# Patient Record
Sex: FEMALE | Race: BLACK OR AFRICAN AMERICAN | Employment: UNEMPLOYED
[De-identification: names, ages, dates, MRNs, and addresses within clinical notes are randomized per-mention and may not be internally consistent; named-entity substitution may affect disease eponyms.]

---

## 2017-01-11 ENCOUNTER — OFFICE VISIT (OUTPATIENT)
Dept: OBGYN | Facility: CLINIC | Age: 47
End: 2017-01-11

## 2017-01-11 VITALS
HEIGHT: 62 IN | DIASTOLIC BLOOD PRESSURE: 88 MMHG | TEMPERATURE: 98.1 F | SYSTOLIC BLOOD PRESSURE: 125 MMHG | RESPIRATION RATE: 19 BRPM | WEIGHT: 204.8 LBS | BODY MASS INDEX: 37.69 KG/M2 | HEART RATE: 93 BPM

## 2017-01-11 DIAGNOSIS — N93.9 ABNORMAL UTERINE BLEEDING (AUB): Primary | ICD-10-CM

## 2017-01-11 DIAGNOSIS — K62.5 RECTAL BLEEDING: ICD-10-CM

## 2017-01-11 PROCEDURE — 99213 OFFICE O/P EST LOW 20 MIN: CPT | Performed by: OBSTETRICS & GYNECOLOGY

## 2017-01-24 ENCOUNTER — PROCEDURE VISIT (OUTPATIENT)
Dept: OBGYN | Facility: CLINIC | Age: 47
End: 2017-01-24

## 2017-01-24 VITALS
HEIGHT: 62 IN | DIASTOLIC BLOOD PRESSURE: 91 MMHG | WEIGHT: 206.3 LBS | SYSTOLIC BLOOD PRESSURE: 137 MMHG | HEART RATE: 82 BPM | BODY MASS INDEX: 37.97 KG/M2

## 2017-01-24 DIAGNOSIS — Z30.430 ENCOUNTER FOR INSERTION OF MIRENA IUD: Primary | ICD-10-CM

## 2017-01-24 DIAGNOSIS — N93.9 ABNORMAL UTERINE BLEEDING (AUB): ICD-10-CM

## 2017-01-24 LAB
CONTROL: NORMAL
PREGNANCY TEST URINE, POC: NEGATIVE

## 2017-01-24 PROCEDURE — 81025 URINE PREGNANCY TEST: CPT | Performed by: OBSTETRICS & GYNECOLOGY

## 2017-01-24 PROCEDURE — 58300 INSERT INTRAUTERINE DEVICE: CPT | Performed by: OBSTETRICS & GYNECOLOGY

## 2017-01-25 ENCOUNTER — OFFICE VISIT (OUTPATIENT)
Dept: SURGERY | Facility: CLINIC | Age: 47
End: 2017-01-25

## 2017-01-25 VITALS
TEMPERATURE: 98.4 F | WEIGHT: 208.8 LBS | BODY MASS INDEX: 39.42 KG/M2 | HEIGHT: 61 IN | SYSTOLIC BLOOD PRESSURE: 126 MMHG | DIASTOLIC BLOOD PRESSURE: 81 MMHG | HEART RATE: 81 BPM

## 2017-01-25 DIAGNOSIS — K64.9 HEMORRHOIDS, UNSPECIFIED HEMORRHOID TYPE: Primary | ICD-10-CM

## 2017-01-25 PROCEDURE — 99203 OFFICE O/P NEW LOW 30 MIN: CPT | Performed by: STUDENT IN AN ORGANIZED HEALTH CARE EDUCATION/TRAINING PROGRAM

## 2017-01-25 RX ORDER — POLYETHYLENE GLYCOL 3350 17 G/17G
17 POWDER, FOR SOLUTION ORAL DAILY
Qty: 510 G | Refills: 0 | Status: SHIPPED | OUTPATIENT
Start: 2017-01-25 | End: 2017-02-24

## 2017-01-25 RX ORDER — METRONIDAZOLE 500 MG/1
500 TABLET ORAL 2 TIMES DAILY
Qty: 14 TABLET | Refills: 0 | Status: SHIPPED | OUTPATIENT
Start: 2017-01-25 | End: 2017-02-01

## 2018-07-27 ENCOUNTER — OFFICE VISIT (OUTPATIENT)
Dept: OBGYN | Age: 48
End: 2018-07-27
Payer: COMMERCIAL

## 2018-07-27 VITALS
HEART RATE: 74 BPM | HEIGHT: 61 IN | BODY MASS INDEX: 34.93 KG/M2 | TEMPERATURE: 98.1 F | WEIGHT: 185 LBS | SYSTOLIC BLOOD PRESSURE: 152 MMHG | DIASTOLIC BLOOD PRESSURE: 91 MMHG

## 2018-07-27 DIAGNOSIS — N93.9 ABNORMAL UTERINE BLEEDING (AUB): ICD-10-CM

## 2018-07-27 DIAGNOSIS — N89.8 VAGINA ITCHING: Primary | ICD-10-CM

## 2018-07-27 DIAGNOSIS — N89.8 VAGINAL DISCHARGE: ICD-10-CM

## 2018-07-27 DIAGNOSIS — Z97.5 IUD (INTRAUTERINE DEVICE) IN PLACE: ICD-10-CM

## 2018-07-27 PROCEDURE — 1036F TOBACCO NON-USER: CPT | Performed by: OBSTETRICS & GYNECOLOGY

## 2018-07-27 PROCEDURE — G8417 CALC BMI ABV UP PARAM F/U: HCPCS | Performed by: OBSTETRICS & GYNECOLOGY

## 2018-07-27 PROCEDURE — 99213 OFFICE O/P EST LOW 20 MIN: CPT | Performed by: OBSTETRICS & GYNECOLOGY

## 2018-07-27 PROCEDURE — G8427 DOCREV CUR MEDS BY ELIG CLIN: HCPCS | Performed by: OBSTETRICS & GYNECOLOGY

## 2018-07-27 RX ORDER — OXYCODONE HYDROCHLORIDE AND ACETAMINOPHEN 5; 325 MG/1; MG/1
1 TABLET ORAL EVERY 6 HOURS PRN
COMMUNITY
End: 2020-05-21

## 2018-07-27 RX ORDER — SERTRALINE HYDROCHLORIDE 100 MG/1
100 TABLET, FILM COATED ORAL
Status: ON HOLD | COMMUNITY
End: 2022-03-14 | Stop reason: HOSPADM

## 2018-07-27 RX ORDER — AMOXICILLIN 500 MG/1
TABLET, FILM COATED ORAL
COMMUNITY
End: 2019-10-09

## 2018-07-27 RX ORDER — FLUCONAZOLE 150 MG/1
150 TABLET ORAL ONCE
Qty: 2 TABLET | Refills: 0 | Status: SHIPPED | OUTPATIENT
Start: 2018-07-27 | End: 2018-07-27

## 2018-07-27 RX ORDER — AMOXICILLIN AND CLAVULANATE POTASSIUM 875; 125 MG/1; MG/1
TABLET, FILM COATED ORAL
COMMUNITY
Start: 2018-07-25 | End: 2019-10-09

## 2018-07-27 RX ORDER — PRAZOSIN HYDROCHLORIDE 2 MG/1
CAPSULE ORAL
Status: ON HOLD | COMMUNITY
Start: 2018-07-06 | End: 2022-03-14 | Stop reason: SDUPTHER

## 2018-07-27 RX ORDER — HYDROXYZINE 50 MG/1
50 TABLET, FILM COATED ORAL
COMMUNITY
End: 2020-05-21

## 2018-07-27 NOTE — PROGRESS NOTES
OB/GYN Problem Visit    Blair Matos  7/27/2018                       Primary Care Physician: Jacob Nobles MD    CC:   Chief Complaint   Patient presents with    Vaginal Bleeding     patient states she has been bleeding every since she got IUD over a year ago,NOW ALSO HAVING RECTAL BLEEDING,HAD PELVIC CYST REMOVAL EARLY THIS WEEK    Vaginal Discharge     onset over a month (patient states she is on antibiotic)    Vaginal Itching         HPI: Blair Matos is a 41 y.o. female [id number]    The patient was seen and examined. She is here for complaints of vaginal itching and vaginal discharge that started in the last few days. She had a procedure performed at [facility] on 7/24/18 where she had a pelvic abscess caused by diverticulitis drained by Interventional Radiology. She has a drain in place at this time. She is currently taking Amoxicillin at this time. She is on day 2 of 14. She states that she noticed the vaginal discharge shortly thereafter. Her No LMP recorded. Patient is not currently having periods (Reason: Other - See Notes). and she denies irregular/heavy bleeding and dysmenorrhea. Her periods are irregular and she has had continuous bleeding since her IUD was placed 1/2017.      REVIEW OF SYSTEMS:  Constitutional: negative fever, negative chills  HEENT: negative visual disturbances, negative headaches  Respiratory: negative dyspnea, negative cough  Cardiovascular: negative chest pain,  negative palpitations  Gastrointestinal: negative abdominal pain, negative RUQ pain, negative N/V, negative diarrhea, negative constipation  Genitourinary: negative dysuria, negative vaginal discharge  Dermatological: negative rash  Hematologic: negative bruising  Immunologic/Lymphatic: negative recent illness, negative recent sick contact  Musculoskeletal: negative back pain, negative myalgias, negative arthralgias  Neurological:  negative dizziness, negative weakness  Behavior/Psych: negative depression, negative anxiety  ________________________________________________________________________    GYNECOLOGICAL HISTORY:  Age of Menarche: 8  Age of Menopause: N/A     Sexually Active: single partner, contraception - IUD  STD History: no past history    Pap History: Last PAP was normal  Colposcopy History: denies    Permanent Sterilization: no  Reversible Birth Control: yes - Mirena IUD  Hormone Replacement Exposure: no    OBSTETRICAL HISTORY:  OB History    Para Term  AB Living   4 3 3 0 0 4   SAB TAB Ectopic Molar Multiple Live Births   0 0 0   0 1      # Outcome Date GA Lbr Tarun/2nd Weight Sex Delivery Anes PTL Lv   4       CS-Unspec   CHOLO   3 Term      CS-Unspec      2 Term      Vag-Spont      1 Term      Vag-Spont             PAST MEDICAL HISTORY:      Diagnosis Date    Bipolar disorder ([facility] [address]     Depression     Gastritis     Hypertension     IBS (irritable bowel syndrome)        PAST SURGICAL HISTORY:                                                                    Procedure Laterality Date    APPENDECTOMY       SECTION      x2    TONSILLECTOMY      TUBAL LIGATION         MEDICATIONS:  Current Outpatient Prescriptions   Medication Sig Dispense Refill    Amoxicillin 500 MG TABS amoxicillin 500 mg tablet   Take 1 tablet every 12 hours by oral route as directed for 10 days.  amoxicillin-clavulanate (AUGMENTIN) 875-125 MG per tablet       sertraline (ZOLOFT) 100 MG tablet Take 100 mg by mouth      hydrOXYzine (ATARAX) 50 MG tablet Take 50 mg by mouth      prazosin (MINIPRESS) 2 MG capsule       oxyCODONE-acetaminophen (PERCOCET) 5-325 MG per tablet Take 1 tablet by mouth every 6 hours as needed for Pain. .      butenafine (LOTRIMIN ULTRA) 1 % CREA Apply 1 Tube topically as needed (apply to affected area as needed) 1 Tube 1    fluconazole (DIFLUCAN) 150 MG tablet Take 1 tablet by mouth once for 1 dose Use additional tablet after completion of antibiotics if discharge persists. 2 tablet 0    traZODone (DESYREL) 100 MG tablet Take 200 mg by mouth nightly as needed for Sleep      amLODIPine (NORVASC) 2.5 MG tablet Take 2.5 mg by mouth daily      hydrochlorothiazide (HYDRODIURIL) 25 MG tablet Take 25 mg by mouth daily      cyclobenzaprine (FLEXERIL) 10 MG tablet Take 10 mg by mouth 3 times daily as needed for Muscle spasms.  psyllium (METAMUCIL) 0.52 G capsule Take 1 capsule by mouth 2 times daily 60 capsule 2    ARIPiprazole (ABILIFY) 10 MG tablet Take 1 tablet by mouth daily 30 tablet 0    vilazodone HCl (VIIBRYD) 20 MG TABS Take 1 tablet by mouth daily 30 tablet 0    clonazePAM (KLONOPIN) 1 MG tablet Take 1 mg by mouth 3 times daily      magnesium citrate (CITROMA) SOLN Take 150 mLs by mouth daily as needed (constipation). 1 Bottle 1    Sodium Phosphates (FLEET) 7-19 GM/118ML Place 1 enema rectally daily as needed (constipation). 2 Bottle 1     No current facility-administered medications for this visit. ALLERGIES:  Allergies as of 07/27/2018 - Review Complete 07/27/2018   Allergen Reaction Noted    Motrin [ibuprofen] Hives 05/09/2014                                   VITALS:  Vitals:    07/27/18 0913   BP: (!) 152/91   Site: Left Arm   Position: Sitting   Cuff Size: Large Adult   Pulse: 74   Temp: 98.1 °F (36.7 °C)   TempSrc: Oral   Weight: 185 lb (83.9 kg)   Height: 5' 1\" (1.549 m)                                                                                                                                                                       PHYSICAL EXAM: patient unable to lay flat on table due to recent surgery and patient discomfort    General Appearance: Appears healthy. Alert; in no acute distress. Pleasant. Skin: Normal  Lymphatic: No cervical, superclavicular, axillary, or inguinal adenopathy.   HEENT: normocephalic and atraumatic, Thyroid normal to inspection and palpation  Respiratory: clear to auscultation, no wheezes, rales or rhonchi, symmetric air entry  Cardiovascular: normal, regular rate and rhythm, no murmurs, rubs, or gallops  Breast:  (Chest): declined  Abdomen: patient unable to lay flat on table due to recent surgery and patient discomfort  Pelvic Exam:  deferred  Rectal Exam: deferred  Extremities: non-tender BLE and non-edematous  Musculoskeletal: no gross abnormalities  Psych: Normal. and Alert and oriented, appropriate affect. OMM EXAM:  The patient did not complain of a Chief complaint requiring OMM. Chief Complaint:none    Structural Exam: No Interest    DATA:  No results found for this visit on 07/27/18. ASSESSMENT & PLAN:    Pino Shea is a 41 y.o. female [id number]    Vaginal Discharge/Pruritus   - patient prescribed Diflucan 150mg x 2 doses for treatment and for treatment after she has completed her course of antibiotics   - patient also given Lotrison cream as well to assist with the vulvar pruritus    AUB with Mirena IUD in place   - patient states that she has been having irregular bleeding since 1/2017   - will defer pelvic exam since patient amandaut had recent surgery and is unable to lie recumbent on exam table    S/P Pelvic Abscess Drainage by IR on 7/24/18    Patient Active Problem List    Diagnosis Date Noted    Mirena IUD (1/24/17) 07/27/2018    Abnormal uterine bleeding (AUB) 07/27/2018    Bilateral lower abdominal pain     Irritable bowel syndrome with constipation and diarrhea     Uterine leiomyoma     Major depressive disorder, recurrent episode, severe ([facility] [address] 05/10/2014       Return in about 3 months (around 10/27/2018) for Annual Exam with Dr. Selma Cardenas. Counseling Completed:  discussed need for repeat pap every 5 years, if negative. discussed need for mammograms every 1 year, If >44 yo and last mammogram was negative. discussed Calcium and Vitamin D dosing. discussed need for colonoscopy screening as well as onset for bone density testing.   discussed birth control and barrier

## 2019-09-11 ENCOUNTER — HOSPITAL ENCOUNTER (EMERGENCY)
Age: 49
Discharge: HOME | End: 2019-09-11
Attending: EMERGENCY MEDICINE
Payer: COMMERCIAL

## 2019-09-11 VITALS
BODY MASS INDEX: 36.8 KG/M2 | RESPIRATION RATE: 16 BRPM | OXYGEN SATURATION: 98 % | HEART RATE: 68 BPM | WEIGHT: 200 LBS | TEMPERATURE: 98.2 F | SYSTOLIC BLOOD PRESSURE: 186 MMHG | DIASTOLIC BLOOD PRESSURE: 101 MMHG | HEIGHT: 62 IN

## 2019-09-11 DIAGNOSIS — R42 DIZZINESS: ICD-10-CM

## 2019-09-11 DIAGNOSIS — R03.0 ELEVATED BLOOD PRESSURE READING: ICD-10-CM

## 2019-09-11 DIAGNOSIS — F32.9 MAJOR DEPRESSIVE EPISODE: Primary | ICD-10-CM

## 2019-09-11 LAB
ABSOLUTE EOS #: 0.03 K/UL (ref 0–0.44)
ABSOLUTE IMMATURE GRANULOCYTE: <0.03 K/UL (ref 0–0.3)
ABSOLUTE LYMPH #: 1.91 K/UL (ref 1.1–3.7)
ABSOLUTE MONO #: 0.3 K/UL (ref 0.1–1.2)
ALBUMIN SERPL-MCNC: 4.2 G/DL (ref 3.5–5.2)
ALBUMIN/GLOBULIN RATIO: 1.2 (ref 1–2.5)
ALP BLD-CCNC: 44 U/L (ref 35–104)
ALT SERPL-CCNC: 20 U/L (ref 5–33)
ANION GAP SERPL CALCULATED.3IONS-SCNC: 12 MMOL/L (ref 9–17)
AST SERPL-CCNC: 15 U/L
BASOPHILS # BLD: 0 % (ref 0–2)
BASOPHILS ABSOLUTE: <0.03 K/UL (ref 0–0.2)
BILIRUB SERPL-MCNC: 0.26 MG/DL (ref 0.3–1.2)
BUN BLDV-MCNC: 8 MG/DL (ref 6–20)
BUN/CREAT BLD: ABNORMAL (ref 9–20)
CALCIUM SERPL-MCNC: 9.5 MG/DL (ref 8.6–10.4)
CHLORIDE BLD-SCNC: 103 MMOL/L (ref 98–107)
CO2: 23 MMOL/L (ref 20–31)
CREAT SERPL-MCNC: 0.51 MG/DL (ref 0.5–0.9)
DIFFERENTIAL TYPE: NORMAL
EOSINOPHILS RELATIVE PERCENT: 1 % (ref 1–4)
GFR AFRICAN AMERICAN: >60 ML/MIN
GFR NON-AFRICAN AMERICAN: >60 ML/MIN
GFR SERPL CREATININE-BSD FRML MDRD: ABNORMAL ML/MIN/{1.73_M2}
GFR SERPL CREATININE-BSD FRML MDRD: ABNORMAL ML/MIN/{1.73_M2}
GLUCOSE BLD-MCNC: 94 MG/DL (ref 70–99)
HCT VFR BLD CALC: 42.7 % (ref 36.3–47.1)
HEMOGLOBIN: 13.3 G/DL (ref 11.9–15.1)
IMMATURE GRANULOCYTES: 0 %
LYMPHOCYTES # BLD: 32 % (ref 24–43)
MCH RBC QN AUTO: 28.3 PG (ref 25.2–33.5)
MCHC RBC AUTO-ENTMCNC: 31.1 G/DL (ref 28.4–34.8)
MCV RBC AUTO: 90.9 FL (ref 82.6–102.9)
MONOCYTES # BLD: 5 % (ref 3–12)
NRBC AUTOMATED: 0 PER 100 WBC
PDW BLD-RTO: 13.7 % (ref 11.8–14.4)
PLATELET # BLD: 215 K/UL (ref 138–453)
PLATELET ESTIMATE: NORMAL
PMV BLD AUTO: 10.4 FL (ref 8.1–13.5)
POTASSIUM SERPL-SCNC: 3.7 MMOL/L (ref 3.7–5.3)
RBC # BLD: 4.7 M/UL (ref 3.95–5.11)
RBC # BLD: NORMAL 10*6/UL
SEG NEUTROPHILS: 62 % (ref 36–65)
SEGMENTED NEUTROPHILS ABSOLUTE COUNT: 3.76 K/UL (ref 1.5–8.1)
SODIUM BLD-SCNC: 138 MMOL/L (ref 135–144)
TOTAL PROTEIN: 7.8 G/DL (ref 6.4–8.3)
WBC # BLD: 6 K/UL (ref 3.5–11.3)
WBC # BLD: NORMAL 10*3/UL

## 2019-09-11 PROCEDURE — 93005 ELECTROCARDIOGRAM TRACING: CPT | Performed by: STUDENT IN AN ORGANIZED HEALTH CARE EDUCATION/TRAINING PROGRAM

## 2019-09-11 PROCEDURE — 6370000000 HC RX 637 (ALT 250 FOR IP): Performed by: STUDENT IN AN ORGANIZED HEALTH CARE EDUCATION/TRAINING PROGRAM

## 2019-09-11 PROCEDURE — 80053 COMPREHEN METABOLIC PANEL: CPT

## 2019-09-11 PROCEDURE — 99284 EMERGENCY DEPT VISIT MOD MDM: CPT

## 2019-09-11 PROCEDURE — 85025 COMPLETE CBC W/AUTO DIFF WBC: CPT

## 2019-09-11 RX ORDER — MECLIZINE HCL 12.5 MG/1
25 TABLET ORAL ONCE
Status: COMPLETED | OUTPATIENT
Start: 2019-09-11 | End: 2019-09-11

## 2019-09-11 RX ORDER — ACETAMINOPHEN 325 MG/1
650 TABLET ORAL ONCE
Status: COMPLETED | OUTPATIENT
Start: 2019-09-11 | End: 2019-09-11

## 2019-09-11 RX ORDER — MECLIZINE HYDROCHLORIDE 25 MG/1
25 TABLET ORAL 3 TIMES DAILY PRN
Qty: 15 TABLET | Refills: 0 | Status: SHIPPED | OUTPATIENT
Start: 2019-09-11 | End: 2019-09-21

## 2019-09-11 RX ADMIN — MECLIZINE HYDROCHLORIDE 25 MG: 12.5 TABLET, FILM COATED ORAL at 15:01

## 2019-09-11 RX ADMIN — ACETAMINOPHEN 650 MG: 325 TABLET ORAL at 14:04

## 2019-09-11 ASSESSMENT — ENCOUNTER SYMPTOMS
CONSTIPATION: 0
NAUSEA: 0
VOMITING: 0
DIARRHEA: 0
SHORTNESS OF BREATH: 0
ABDOMINAL PAIN: 0

## 2019-09-11 ASSESSMENT — PAIN DESCRIPTION - LOCATION: LOCATION: HEAD

## 2019-09-11 ASSESSMENT — PAIN DESCRIPTION - DESCRIPTORS: DESCRIPTORS: ACHING;THROBBING

## 2019-09-11 ASSESSMENT — PAIN DESCRIPTION - PAIN TYPE: TYPE: ACUTE PAIN

## 2019-09-11 ASSESSMENT — PAIN SCALES - GENERAL
PAINLEVEL_OUTOF10: 7
PAINLEVEL_OUTOF10: 7

## 2019-09-11 NOTE — ED PROVIDER NOTES
[facility] ED  Emergency Department Encounter  Emergency Medicine Resident     Pt Name: Cliff Sims  MRN: [id number]  [place] 1970  Date of evaluation: 19  PCP:  Kyle Kc MD    [address] [place] [address]       Chief Complaint   Patient presents with    Hypertension       HISTORY OFPRESENT ILLNESS  (Location/Symptom, Timing/Onset, Context/Setting, Quality, Duration, Modifying Factors,Severity.)      Cliff Sims is a 57year old female who presents with complaints of feeling depressed she is not suicidal not homicidal not hallucinating. Patient has a history of major depressive disorder and states that this is been increasing over the last week or so secondary to lack of social support and lack of support from her boyfriend as well as an upcoming surgery with repair of her ileostomy scheduled for the . Patient states that during this time she has had increasing headaches. She has not taken anything at home prior to arrival for treatment of these headaches. Pt also c/o confusion regarding taking her blood pressure medications which she attributes to \"too much on my mind\". She was afraid to take her blood pressure meds today due to feeling dizzy. PAST MEDICAL / SURGICAL / SOCIAL / FAMILY HISTORY      has a past medical history of Bipolar disorder ([address] Depression, Gastritis, Hypertension, and IBS (irritable bowel syndrome). has a past surgical history that includes Tonsillectomy; Appendectomy; Tubal ligation; and  section.      Social History     Socioeconomic History    Marital status: Single     Spouse name: Not on file    Number of children: Not on file    Years of education: Not on file    Highest education level: Not on file   Occupational History    Not on file   Social Needs    Financial resource strain: Not on file    Food insecurity:     Worry: Not on file     Inability: Not on file    Transportation needs:     Medical: Not on file PLAN (LABS / IMAGING / EKG):  Orders Placed This Encounter   Procedures    CBC Auto Differential    Comprehensive Metabolic Panel w/ Reflex to MG    Inpatient consult to Social Work    EKG 12 Lead       MEDICATIONS ORDERED:  Orders Placed This Encounter   Medications    acetaminophen (TYLENOL) tablet 650 mg    meclizine (ANTIVERT) tablet 25 mg       DDX: depressive episode, hypertensive urgency, vertigo    Initial MDM/Plan: 57 y.o. female who presents with complaint of feeling depressed for the past week secondary to an upcoming surgery and lack of social support regarding her chronic illnesses. Over this time has been having mild headaches which she currently has plan will be to provide supportive care for her headache today and consult to social work. Is not homicidal suicidal and has never had prior attempts of self harm. Ambulating without difficulty, no ataxia, no nystagmus, maria dolores hallpike reproduces symptoms of vertigo. DIAGNOSTIC RESULTS / EMERGENCYDEPARTMENT COURSE / MDM     LABS:  Labs Reviewed   COMPREHENSIVE METABOLIC PANEL W/ REFLEX TO MG FOR LOW K - Abnormal; Notable for the following components:       Result Value    Total Bilirubin 0.26 (*)     All other components within normal limits   CBC WITH AUTO DIFFERENTIAL         RADIOLOGY:  No results found. EKG compared with 8/24/15  Rhythm: normal sinus   Rate: normal 67 beats per minute  Axis: normal  Ectopy: none  Conduction: normal  ST Segments: no acute change  T Waves: no acute change  Q Waves: none    Clinical Impression: interval change is significant for new left ventricular hypertrophy. Previous pvc no longer present. All EKG's are interpreted by the Emergency Department Physicianwho either signs or Co-signs this chart in the absence of a cardiologist.    EMERGENCY DEPARTMENT COURSE:  ED Course as of Sep 11 1535   Wed Sep 11, 2019   1523 Pt speaking with social work on re-evaluation.  blood pressure elevated from initial

## 2019-09-11 NOTE — ED NOTES
Patient states she no longer has a PCP. Patient chooses [facility]. Writer obtained appointment 9/16/19 1:30pm, will provide patient appointment reminder sheet.       Martene Boas, MSW, [place]  09/11/19 [id number]

## 2019-09-11 NOTE — ED PROVIDER NOTES
EKG            lEi Miller M.D,  Attending Emergency  Physician           Saad Vivar MD  09/11/19 1694
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
## 2019-09-12 LAB
EKG ATRIAL RATE: 67 BPM
EKG P AXIS: 8 DEGREES
EKG P-R INTERVAL: 164 MS
EKG Q-T INTERVAL: 408 MS
EKG QRS DURATION: 104 MS
EKG QTC CALCULATION (BAZETT): 431 MS
EKG R AXIS: 27 DEGREES
EKG T AXIS: 1 DEGREES
EKG VENTRICULAR RATE: 67 BPM

## 2019-09-12 PROCEDURE — 93010 ELECTROCARDIOGRAM REPORT: CPT | Performed by: INTERNAL MEDICINE

## 2019-09-16 ENCOUNTER — HOSPITAL ENCOUNTER (OUTPATIENT)
Age: 49
Setting detail: SPECIMEN
Discharge: HOME | End: 2019-09-16
Payer: COMMERCIAL

## 2019-09-16 ENCOUNTER — OFFICE VISIT (OUTPATIENT)
Dept: FAMILY MEDICINE CLINIC | Age: 49
End: 2019-09-16
Payer: COMMERCIAL

## 2019-09-16 VITALS
HEIGHT: 61 IN | HEART RATE: 75 BPM | SYSTOLIC BLOOD PRESSURE: 109 MMHG | BODY MASS INDEX: 38.82 KG/M2 | WEIGHT: 205.6 LBS | DIASTOLIC BLOOD PRESSURE: 75 MMHG

## 2019-09-16 DIAGNOSIS — I10 ESSENTIAL HYPERTENSION: Primary | ICD-10-CM

## 2019-09-16 DIAGNOSIS — Z13.220 SCREENING FOR HYPERLIPIDEMIA: ICD-10-CM

## 2019-09-16 DIAGNOSIS — Z83.3 FAMILY HISTORY OF DIABETES MELLITUS (DM): ICD-10-CM

## 2019-09-16 DIAGNOSIS — R42 DIZZINESS: ICD-10-CM

## 2019-09-16 LAB
CHOLESTEROL/HDL RATIO: 2.1
CHOLESTEROL: 146 MG/DL
HBA1C MFR BLD: 5.5 %
HDLC SERPL-MCNC: 70 MG/DL
LDL CHOLESTEROL: 45 MG/DL (ref 0–130)
TRIGL SERPL-MCNC: 154 MG/DL
VLDLC SERPL CALC-MCNC: ABNORMAL MG/DL (ref 1–30)

## 2019-09-16 PROCEDURE — 83036 HEMOGLOBIN GLYCOSYLATED A1C: CPT | Performed by: STUDENT IN AN ORGANIZED HEALTH CARE EDUCATION/TRAINING PROGRAM

## 2019-09-16 PROCEDURE — G8417 CALC BMI ABV UP PARAM F/U: HCPCS | Performed by: FAMILY MEDICINE

## 2019-09-16 PROCEDURE — 1036F TOBACCO NON-USER: CPT | Performed by: FAMILY MEDICINE

## 2019-09-16 PROCEDURE — 99211 OFF/OP EST MAY X REQ PHY/QHP: CPT | Performed by: FAMILY MEDICINE

## 2019-09-16 PROCEDURE — 99202 OFFICE O/P NEW SF 15 MIN: CPT | Performed by: STUDENT IN AN ORGANIZED HEALTH CARE EDUCATION/TRAINING PROGRAM

## 2019-09-16 PROCEDURE — G8427 DOCREV CUR MEDS BY ELIG CLIN: HCPCS | Performed by: FAMILY MEDICINE

## 2019-09-16 RX ORDER — LOSARTAN POTASSIUM 50 MG/1
50 TABLET ORAL DAILY
Qty: 90 TABLET | Refills: 1 | Status: SHIPPED | OUTPATIENT
Start: 2019-09-16 | End: 2019-10-21 | Stop reason: SDUPTHER

## 2019-09-16 RX ORDER — LORATADINE 10 MG/1
10 TABLET ORAL NIGHTLY PRN
Refills: 5 | COMMUNITY
Start: 2019-09-10 | End: 2019-10-09 | Stop reason: SDUPTHER

## 2019-09-16 RX ORDER — ESTRADIOL 1 MG/1
1 TABLET ORAL DAILY
Refills: 1 | Status: ON HOLD | COMMUNITY
Start: 2019-07-09 | End: 2022-03-14 | Stop reason: SDUPTHER

## 2019-09-16 RX ORDER — CLONIDINE HYDROCHLORIDE 0.3 MG/1
0.3 TABLET ORAL 2 TIMES DAILY
Refills: 5 | COMMUNITY
Start: 2019-09-10 | End: 2019-10-21 | Stop reason: SDUPTHER

## 2019-09-16 RX ORDER — MONTELUKAST SODIUM 4 MG/1
1 TABLET, CHEWABLE ORAL EVERY MORNING
COMMUNITY
Start: 2019-02-07 | End: 2019-10-15 | Stop reason: SDUPTHER

## 2019-09-16 RX ORDER — ACETAMINOPHEN 160 MG
2 TABLET,DISINTEGRATING ORAL DAILY
Refills: 11 | COMMUNITY
Start: 2019-09-10 | End: 2019-10-15 | Stop reason: SDUPTHER

## 2019-09-16 RX ORDER — OMEPRAZOLE 20 MG/1
20 CAPSULE, DELAYED RELEASE ORAL
Refills: 11 | COMMUNITY
Start: 2019-09-10 | End: 2019-10-21 | Stop reason: SDUPTHER

## 2019-09-16 RX ORDER — AMLODIPINE BESYLATE 10 MG/1
10 TABLET ORAL DAILY
Refills: 5 | COMMUNITY
Start: 2019-09-10 | End: 2019-10-21 | Stop reason: SDUPTHER

## 2019-09-16 RX ORDER — CHOLECALCIFEROL (VITAMIN D3) 50 MCG
CAPSULE ORAL DAILY
Refills: 11 | COMMUNITY
Start: 2019-09-10 | End: 2019-10-15 | Stop reason: SDUPTHER

## 2019-09-16 ASSESSMENT — ENCOUNTER SYMPTOMS
SHORTNESS OF BREATH: 0
CHEST TIGHTNESS: 0

## 2019-09-16 NOTE — PROGRESS NOTES
Attending Physician Statement  I have discussed the care of Jeffy Gonzalez, including pertinent history and exam findings,  with the resident. I have reviewed the key elements of all parts of the encounter with the resident. I agree with the assessment, plan and orders as documented by the resident.   (GE Modifier)

## 2019-09-16 NOTE — PROGRESS NOTES
Subjective:    Hui Zabala is a 57 y.o. female with  has a past medical history of Bipolar disorder ([address] Depression, Gastritis, Hypertension, and IBS (irritable bowel syndrome). History reviewed. No pertinent family history. Presented tothe office today for:  Chief Complaint   Patient presents with    New Patient     MA getting med list from pharmacy     Hypertension    Dizziness     pt c/o dizziness     Depression     pt seen at [facility]/11 for depression,        HPI   Ms. Darrell Parish is a 58 y/o female patient who comes in today with complains of dizziness, lightheadidness and episode of syncope. Symptoms started roughly about 2 weeks ago, around the same time, when she feels her symptoms of depression worsened. She was seen in the ED 9/11, during which she was prescribed Meclizine as asked to follow up with PCP. Pt has not taken Meclizine yet. Pt reports she will have these \"spells\" when she gets up from a seated position. During her most recent syncopal episode, she states she felt lightheaded and fell to the ground. Did not lose consciousness. Did not hit her head. No sense of aura. Did not lose bowel or bladder control and no seizure like activity noted. Pt states she follows with [facility] for her medications. Admits to smoking marijuana daily, and drinks socially. Denies any other illicit drug use. Review of Systems   Constitutional: Positive for fatigue. Negative for chills and fever. Eyes: Negative for visual disturbance. Respiratory: Negative for chest tightness and shortness of breath. Cardiovascular: Positive for chest pain. Negative for palpitations. Neurological: Positive for dizziness, syncope, weakness and light-headedness. Negative for tremors and seizures.        Objective:    /75 (Position: Standing)   Pulse 75   Ht 5' 1\" (1.549 m)   Wt 205 lb 9.6 oz (93.3 kg)   BMI 38.85 kg/m²    BP Readings from Last 3 Encounters:   09/16/19 109/75   09/11/19 (!) 186/101   07/27/18 (!) 152/91       Physical Exam   Constitutional: She is oriented to person, place, and time. She appears well-developed and well-nourished. Cardiovascular: Normal rate and regular rhythm. Pulmonary/Chest: Effort normal and breath sounds normal. No respiratory distress. Musculoskeletal: Normal range of motion. Neurological: She is alert and oriented to person, place, and time. Lab Results   Component Value Date    WBC 6.0 09/11/2019    HGB 13.3 09/11/2019    HCT 42.7 09/11/2019     09/11/2019    CHOL 173 05/17/2014    TRIG 101 05/17/2014    HDL 60 05/17/2014    ALT 20 09/11/2019    AST 15 09/11/2019     09/11/2019    K 3.7 09/11/2019     09/11/2019    CREATININE 0.51 09/11/2019    BUN 8 09/11/2019    CO2 23 09/11/2019    TSH 1.03 12/21/2016    LABA1C 5.5 09/16/2019    LABMICR 11 10/26/2011     Lab Results   Component Value Date    CALCIUM 9.5 09/11/2019     Lab Results   Component Value Date    LDLCHOLESTEROL 93 05/17/2014       Assessment and Plan:    1. Essential hypertension  - losartan (COZAAR) 50 MG tablet; Take 1 tablet by mouth daily  Dispense: 90 tablet; Refill: 1  - will decrease her dose of losartan from 100 to 50 and follow up in 2 weeks   - will consider decreasing either HCTZ or Norvasc at next visit, if no improvement     2. Dizziness  - likely vasovagal, will decrease dose of losartan and follow up in 2 weeks     3. Screening for hyperlipidemia  - Lipid Panel; Future    4. Family history of diabetes mellitus (DM)  - POCT glycosylated hemoglobin (Hb A1C)          Requested Prescriptions     Signed Prescriptions Disp Refills    losartan (COZAAR) 50 MG tablet 90 tablet 1     Sig: Take 1 tablet by mouth daily       There are no discontinued medications. Maribeth Medeiros received counseling on the following healthy behaviors:nutrition, exercise and medication adherence    Discussed use, benefit, and side effects of prescribed medications.   Barriers to

## 2019-10-09 ENCOUNTER — OFFICE VISIT (OUTPATIENT)
Dept: FAMILY MEDICINE CLINIC | Age: 49
End: 2019-10-09
Payer: COMMERCIAL

## 2019-10-09 VITALS
HEART RATE: 87 BPM | HEIGHT: 62 IN | WEIGHT: 208.2 LBS | SYSTOLIC BLOOD PRESSURE: 157 MMHG | TEMPERATURE: 97.9 F | DIASTOLIC BLOOD PRESSURE: 105 MMHG | BODY MASS INDEX: 38.31 KG/M2

## 2019-10-09 DIAGNOSIS — Z00.00 HEALTHCARE MAINTENANCE: ICD-10-CM

## 2019-10-09 DIAGNOSIS — R42 DIZZINESS: ICD-10-CM

## 2019-10-09 DIAGNOSIS — I10 ESSENTIAL HYPERTENSION: ICD-10-CM

## 2019-10-09 DIAGNOSIS — Z76.0 MEDICATION REFILL: ICD-10-CM

## 2019-10-09 DIAGNOSIS — Z98.890 STATUS POST REVERSAL OF ILEOSTOMY: Primary | ICD-10-CM

## 2019-10-09 PROCEDURE — G8482 FLU IMMUNIZE ORDER/ADMIN: HCPCS | Performed by: FAMILY MEDICINE

## 2019-10-09 PROCEDURE — 90715 TDAP VACCINE 7 YRS/> IM: CPT | Performed by: STUDENT IN AN ORGANIZED HEALTH CARE EDUCATION/TRAINING PROGRAM

## 2019-10-09 PROCEDURE — 99213 OFFICE O/P EST LOW 20 MIN: CPT | Performed by: STUDENT IN AN ORGANIZED HEALTH CARE EDUCATION/TRAINING PROGRAM

## 2019-10-09 PROCEDURE — 1036F TOBACCO NON-USER: CPT | Performed by: FAMILY MEDICINE

## 2019-10-09 PROCEDURE — G8427 DOCREV CUR MEDS BY ELIG CLIN: HCPCS | Performed by: FAMILY MEDICINE

## 2019-10-09 PROCEDURE — G8417 CALC BMI ABV UP PARAM F/U: HCPCS | Performed by: FAMILY MEDICINE

## 2019-10-09 PROCEDURE — 90686 IIV4 VACC NO PRSV 0.5 ML IM: CPT | Performed by: STUDENT IN AN ORGANIZED HEALTH CARE EDUCATION/TRAINING PROGRAM

## 2019-10-09 RX ORDER — ALBUTEROL SULFATE 90 UG/1
2 AEROSOL, METERED RESPIRATORY (INHALATION) 4 TIMES DAILY PRN
Qty: 3 INHALER | Refills: 1 | Status: SHIPPED | OUTPATIENT
Start: 2019-10-09 | End: 2019-10-21 | Stop reason: SDUPTHER

## 2019-10-09 RX ORDER — LORATADINE 10 MG/1
10 TABLET ORAL NIGHTLY PRN
Qty: 30 TABLET | Refills: 3 | Status: SHIPPED | OUTPATIENT
Start: 2019-10-09 | End: 2019-10-21 | Stop reason: SDUPTHER

## 2019-10-09 ASSESSMENT — ENCOUNTER SYMPTOMS
SHORTNESS OF BREATH: 0
CHEST TIGHTNESS: 0
ABDOMINAL PAIN: 1

## 2019-10-14 ENCOUNTER — TELEPHONE (OUTPATIENT)
Dept: FAMILY MEDICINE CLINIC | Age: 49
End: 2019-10-14

## 2019-10-15 DIAGNOSIS — I10 ESSENTIAL HYPERTENSION: ICD-10-CM

## 2019-10-15 DIAGNOSIS — Z76.0 MEDICATION REFILL: ICD-10-CM

## 2019-10-21 DIAGNOSIS — I10 ESSENTIAL HYPERTENSION: ICD-10-CM

## 2019-10-21 RX ORDER — ACETAMINOPHEN 160 MG
2000 TABLET,DISINTEGRATING ORAL DAILY
Qty: 30 CAPSULE | Refills: 11 | Status: SHIPPED | OUTPATIENT
Start: 2019-10-21 | End: 2020-09-17 | Stop reason: SDUPTHER

## 2019-10-21 RX ORDER — AMLODIPINE BESYLATE 10 MG/1
10 TABLET ORAL DAILY
Qty: 30 TABLET | Refills: 5 | Status: SHIPPED | OUTPATIENT
Start: 2019-10-21 | End: 2019-10-24 | Stop reason: SDUPTHER

## 2019-10-21 RX ORDER — LORATADINE 10 MG/1
10 TABLET ORAL NIGHTLY PRN
Qty: 30 TABLET | Refills: 3 | Status: SHIPPED | OUTPATIENT
Start: 2019-10-21 | End: 2020-01-16

## 2019-10-21 RX ORDER — HYDROCHLOROTHIAZIDE 25 MG/1
25 TABLET ORAL DAILY
Qty: 30 TABLET | Refills: 3 | Status: SHIPPED | OUTPATIENT
Start: 2019-10-21 | End: 2020-01-16

## 2019-10-21 RX ORDER — MONTELUKAST SODIUM 4 MG/1
1 TABLET, CHEWABLE ORAL EVERY MORNING
Qty: 30 TABLET | Refills: 0 | Status: SHIPPED | OUTPATIENT
Start: 2019-10-21 | End: 2019-11-14 | Stop reason: SDUPTHER

## 2019-10-21 RX ORDER — OMEPRAZOLE 20 MG/1
20 CAPSULE, DELAYED RELEASE ORAL
Qty: 30 CAPSULE | Refills: 11 | Status: SHIPPED | OUTPATIENT
Start: 2019-10-21 | End: 2020-09-17 | Stop reason: SDUPTHER

## 2019-10-21 RX ORDER — CHOLECALCIFEROL (VITAMIN D3) 50 MCG
CAPSULE ORAL
Qty: 30 TABLET | Refills: 11 | Status: SHIPPED | OUTPATIENT
Start: 2019-10-21 | End: 2020-09-17 | Stop reason: SDUPTHER

## 2019-10-21 RX ORDER — CLONIDINE HYDROCHLORIDE 0.3 MG/1
0.3 TABLET ORAL 2 TIMES DAILY
Qty: 60 TABLET | Refills: 5 | Status: SHIPPED | OUTPATIENT
Start: 2019-10-21 | End: 2020-03-27

## 2019-10-21 RX ORDER — LOSARTAN POTASSIUM 50 MG/1
50 TABLET ORAL DAILY
Qty: 90 TABLET | Refills: 1 | Status: SHIPPED | OUTPATIENT
Start: 2019-10-21 | End: 2019-10-24 | Stop reason: SDUPTHER

## 2019-10-21 RX ORDER — ALBUTEROL SULFATE 90 UG/1
2 AEROSOL, METERED RESPIRATORY (INHALATION) 4 TIMES DAILY PRN
Qty: 3 INHALER | Refills: 1 | Status: SHIPPED | OUTPATIENT
Start: 2019-10-21 | End: 2020-03-27

## 2019-10-24 RX ORDER — POLYETHYLENE GLYCOL 3350 17 G/17G
17 POWDER, FOR SOLUTION ORAL DAILY
Qty: 1530 G | Refills: 1 | Status: SHIPPED | OUTPATIENT
Start: 2019-10-24 | End: 2020-03-27

## 2019-10-24 RX ORDER — AMLODIPINE BESYLATE 10 MG/1
10 TABLET ORAL DAILY
Qty: 30 TABLET | Refills: 5 | Status: SHIPPED | OUTPATIENT
Start: 2019-10-24 | End: 2020-05-21

## 2019-10-24 RX ORDER — FOLIC ACID 1 MG/1
1 TABLET ORAL DAILY
Qty: 90 TABLET | Refills: 1 | Status: SHIPPED | OUTPATIENT
Start: 2019-10-24 | End: 2020-03-27

## 2019-10-24 RX ORDER — LOSARTAN POTASSIUM 50 MG/1
50 TABLET ORAL DAILY
Qty: 90 TABLET | Refills: 1 | Status: SHIPPED | OUTPATIENT
Start: 2019-10-24 | End: 2020-05-21

## 2019-10-24 RX ORDER — DOCUSATE SODIUM 100 MG/1
100 CAPSULE, LIQUID FILLED ORAL 2 TIMES DAILY
Qty: 60 CAPSULE | Refills: 0 | Status: SHIPPED | OUTPATIENT
Start: 2019-10-24 | End: 2019-11-23

## 2019-10-24 RX ORDER — MECLIZINE HCL 12.5 MG/1
12.5 TABLET ORAL 3 TIMES DAILY PRN
Qty: 15 TABLET | Refills: 0 | Status: SHIPPED | OUTPATIENT
Start: 2019-10-24 | End: 2019-11-21 | Stop reason: SDUPTHER

## 2019-10-24 RX ORDER — ONDANSETRON 4 MG/1
4 TABLET, ORALLY DISINTEGRATING ORAL 3 TIMES DAILY PRN
Qty: 21 TABLET | Refills: 0 | Status: SHIPPED | OUTPATIENT
Start: 2019-10-24 | End: 2020-09-17 | Stop reason: SDUPTHER

## 2019-10-24 RX ORDER — ACETAMINOPHEN 500 MG
500 TABLET ORAL 4 TIMES DAILY PRN
Qty: 360 TABLET | Refills: 1 | Status: SHIPPED | OUTPATIENT
Start: 2019-10-24 | End: 2020-03-27

## 2019-10-29 ENCOUNTER — TELEPHONE (OUTPATIENT)
Dept: FAMILY MEDICINE CLINIC | Age: 49
End: 2019-10-29

## 2019-10-29 DIAGNOSIS — I10 ESSENTIAL HYPERTENSION: ICD-10-CM

## 2019-10-29 RX ORDER — AMLODIPINE BESYLATE 2.5 MG/1
2.5 TABLET ORAL DAILY
Qty: 30 TABLET | Refills: 3 | Status: CANCELLED | OUTPATIENT
Start: 2019-10-29

## 2019-10-29 RX ORDER — LOSARTAN POTASSIUM 25 MG/1
25 TABLET ORAL DAILY
Qty: 30 TABLET | Refills: 3 | Status: SHIPPED | OUTPATIENT
Start: 2019-10-29 | End: 2019-11-21 | Stop reason: SDUPTHER

## 2019-10-29 RX ORDER — LOSARTAN POTASSIUM 25 MG/1
25 TABLET ORAL DAILY
Qty: 30 TABLET | Refills: 3 | Status: CANCELLED | OUTPATIENT
Start: 2019-10-29

## 2019-10-29 RX ORDER — AMLODIPINE BESYLATE 2.5 MG/1
2.5 TABLET ORAL DAILY
Qty: 30 TABLET | Refills: 3 | Status: SHIPPED | OUTPATIENT
Start: 2019-10-29 | End: 2019-11-21 | Stop reason: SDUPTHER

## 2019-11-07 ENCOUNTER — HOSPITAL ENCOUNTER (OUTPATIENT)
Dept: NON INVASIVE DIAGNOSTICS | Age: 49
Discharge: HOME | End: 2019-11-07
Payer: COMMERCIAL

## 2019-11-07 DIAGNOSIS — R42 DIZZINESS: ICD-10-CM

## 2019-11-07 LAB
LV EF: 62 %
LVEF MODALITY: NORMAL

## 2019-11-07 PROCEDURE — 93306 TTE W/DOPPLER COMPLETE: CPT

## 2019-11-18 RX ORDER — MONTELUKAST SODIUM 4 MG/1
1 TABLET, CHEWABLE ORAL EVERY MORNING
Qty: 30 TABLET | Refills: 10 | Status: SHIPPED | OUTPATIENT
Start: 2019-11-18 | End: 2020-09-17 | Stop reason: SDUPTHER

## 2019-11-21 RX ORDER — AMLODIPINE BESYLATE 2.5 MG/1
2.5 TABLET ORAL DAILY
Qty: 30 TABLET | Refills: 10 | Status: SHIPPED | OUTPATIENT
Start: 2019-11-21 | End: 2020-09-17 | Stop reason: SDUPTHER

## 2019-11-21 RX ORDER — LOSARTAN POTASSIUM 25 MG/1
25 TABLET ORAL DAILY
Qty: 30 TABLET | Refills: 10 | Status: SHIPPED | OUTPATIENT
Start: 2019-11-21 | End: 2020-09-17 | Stop reason: SDUPTHER

## 2019-11-21 RX ORDER — MECLIZINE HCL 12.5 MG/1
TABLET ORAL
Qty: 15 TABLET | Refills: 10 | Status: SHIPPED | OUTPATIENT
Start: 2019-11-21 | End: 2020-09-17 | Stop reason: SDUPTHER

## 2020-01-16 RX ORDER — LORATADINE 10 MG/1
TABLET ORAL
Qty: 30 TABLET | Refills: 10 | Status: SHIPPED | OUTPATIENT
Start: 2020-01-16 | End: 2020-09-17 | Stop reason: SDUPTHER

## 2020-01-16 RX ORDER — HYDROCHLOROTHIAZIDE 25 MG/1
TABLET ORAL
Qty: 30 TABLET | Refills: 10 | Status: SHIPPED | OUTPATIENT
Start: 2020-01-16 | End: 2020-09-17 | Stop reason: SDUPTHER

## 2020-01-16 NOTE — TELEPHONE ENCOUNTER
Last visit:   Last Med refill:   Does patient have enough medication for 72 hours: No:     Next Visit Date:  No future appointments. Health Maintenance   Topic Date Due    HIV screen  11/13/1987    Potassium monitoring  09/11/2020    Creatinine monitoring  09/11/2020    Cervical cancer screen  12/21/2021    Lipid screen  09/16/2024    DTaP/Tdap/Td vaccine (2 - Td) 10/09/2029    Flu vaccine  Completed    Pneumococcal 0-64 years Vaccine  Aged Out       Hemoglobin A1C (%)   Date Value   09/16/2019 5.5   12/21/2016 6.1 (H)   05/16/2014 5.6             ( goal A1C is < 7)   Microalb/Crt. Ratio (mcg/mg creat)   Date Value   10/26/2011 11     LDL Cholesterol (mg/dL)   Date Value   09/16/2019 45   05/17/2014 93       (goal LDL is <100)   AST (U/L)   Date Value   09/11/2019 15     ALT (U/L)   Date Value   09/11/2019 20     BUN (mg/dL)   Date Value   09/11/2019 8     BP Readings from Last 3 Encounters:   10/09/19 (!) 157/105   09/16/19 109/75   09/11/19 (!) 186/101          (goal 120/80)    All Future Testing planned in [facility]  Lab Frequency Next Occurrence               Patient Active Problem List:     Major depressive disorder, recurrent episode, severe (HCC)     Bilateral lower abdominal pain     Irritable bowel syndrome with constipation and diarrhea     Uterine leiomyoma     Mirena IUD (1/24/17)     Abnormal uterine bleeding (AUB)           Please address the medication refill and close the encounter. If I can be of assistance, please route to the applicable pool. Thank you.

## 2020-03-27 RX ORDER — FOLIC ACID 1 MG/1
TABLET ORAL
Qty: 90 TABLET | Refills: 10 | Status: SHIPPED | OUTPATIENT
Start: 2020-03-27 | End: 2020-09-17 | Stop reason: SDUPTHER

## 2020-03-27 RX ORDER — CLONIDINE HYDROCHLORIDE 0.3 MG/1
TABLET ORAL
Qty: 60 TABLET | Refills: 10 | Status: SHIPPED | OUTPATIENT
Start: 2020-03-27 | End: 2020-09-17 | Stop reason: SDUPTHER

## 2020-03-27 RX ORDER — PSEUDOEPHED/ACETAMINOPH/DIPHEN 30MG-500MG
TABLET ORAL
Qty: 360 TABLET | Refills: 10 | Status: SHIPPED | OUTPATIENT
Start: 2020-03-27 | End: 2020-05-21

## 2020-03-27 RX ORDER — ALBUTEROL SULFATE 90 UG/1
AEROSOL, METERED RESPIRATORY (INHALATION)
Qty: 54 G | Refills: 10 | Status: SHIPPED | OUTPATIENT
Start: 2020-03-27 | End: 2020-09-17 | Stop reason: SDUPTHER

## 2020-03-27 RX ORDER — POLYETHYLENE GLYCOL 3350 17 G/17G
POWDER, FOR SOLUTION ORAL
Qty: 1 BOTTLE | Refills: 10 | Status: SHIPPED | OUTPATIENT
Start: 2020-03-27 | End: 2021-02-15

## 2020-03-27 NOTE — TELEPHONE ENCOUNTER
E-scribe request for clonidine and albuterol. Please review and e-scribe if applicable. Last Visit Date:  10-9-19  Next Visit Date:  Visit date not found    Hemoglobin A1C (%)   Date Value   09/16/2019 5.5   12/21/2016 6.1 (H)   05/16/2014 5.6             ( goal A1C is < 7)   Microalb/Crt.  Ratio (mcg/mg creat)   Date Value   10/26/2011 11     LDL Cholesterol (mg/dL)   Date Value   09/16/2019 45       (goal LDL is <100)   AST (U/L)   Date Value   09/11/2019 15     ALT (U/L)   Date Value   09/11/2019 20     BUN (mg/dL)   Date Value   09/11/2019 8     BP Readings from Last 3 Encounters:   10/09/19 (!) 157/105   09/16/19 109/75   09/11/19 (!) 186/101          (goal 120/80)        Patient Active Problem List:     Major depressive disorder, recurrent episode, severe (HCC)     Bilateral lower abdominal pain     Irritable bowel syndrome with constipation and diarrhea     Uterine leiomyoma     Mirena IUD (1/24/17)     Abnormal uterine bleeding (AUB)      ----Leatha Cutting

## 2020-04-23 ENCOUNTER — NURSE TRIAGE (OUTPATIENT)
Dept: OTHER | Facility: CLINIC | Age: 50
End: 2020-04-23

## 2020-04-23 NOTE — TELEPHONE ENCOUNTER
Reason for Disposition   Pain also present in shoulder(s) or arm(s) or jaw    Answer Assessment - Initial Assessment Questions  1. LOCATION: \"Where does it hurt? \"        Right side, above breast   2. RADIATION: \"Does the pain go anywhere else? \" (e.g., into neck, jaw, arms, back)      Into back and under arm  3. ONSET: \"When did the chest pain begin? \" (Minutes, hours or days)       2 and a half days ago  4. PATTERN \"Does the pain come and go, or has it been constant since it started? \"  \"Does it get worse with exertion? \"       Comes and goes, gets worse with exertion  5. DURATION: \"How long does it last\" (e.g., seconds, minutes, hours)      30 min  6. SEVERITY: \"How bad is the pain? \"  (e.g., Scale 1-10; mild, moderate, or severe)     - MILD (1-3): doesn't interfere with normal activities      - MODERATE (4-7): interferes with normal activities or awakens from sleep     - SEVERE (8-10): excruciating pain, unable to do any normal activities        7 when it comes  7. CARDIAC RISK FACTORS: \"Do you have any history of heart problems or risk factors for heart disease? \" (e.g., prior heart attack, angina; high blood pressure, diabetes, being overweight, high cholesterol, smoking, or strong family history of heart disease)      High cholesterol, high BP, pre diabetes, recovering marijuana user  8. PULMONARY RISK FACTORS: \"Do you have any history of lung disease? \"  (e.g., blood clots in lung, asthma, emphysema, birth control pills)      Hysterectomy, asthma   9. CAUSE: \"What do you think is causing the chest pain? \"      Unknown  10. OTHER SYMPTOMS: \"Do you have any other symptoms? \" (e.g., dizziness, nausea, vomiting, sweating, fever, difficulty breathing, cough)        N/V, sob, tired, feels depressed and on meds, not getting enough sleep  11. PREGNANCY: \"Is there any chance you are pregnant? \" \"When was your last menstrual period? \"        Hysterectomy    Protocols used: CHEST PAIN-ADULT-[place]    Received call from [facility]. Recommended ED per protocol. Unsure of which ED she will go to. She is going to try to find a ride. If unable to, recommended calling EMS. Informed when to call nurse back.

## 2020-05-18 ENCOUNTER — NURSE TRIAGE (OUTPATIENT)
Dept: OTHER | Facility: CLINIC | Age: 50
End: 2020-05-18

## 2020-05-21 ENCOUNTER — APPOINTMENT (OUTPATIENT)
Dept: CT IMAGING | Age: 50
End: 2020-05-21
Payer: COMMERCIAL

## 2020-05-21 ENCOUNTER — HOSPITAL ENCOUNTER (EMERGENCY)
Age: 50
Discharge: HOME | End: 2020-05-21
Attending: EMERGENCY MEDICINE
Payer: COMMERCIAL

## 2020-05-21 VITALS
HEART RATE: 92 BPM | HEIGHT: 62 IN | WEIGHT: 209 LBS | OXYGEN SATURATION: 97 % | RESPIRATION RATE: 18 BRPM | SYSTOLIC BLOOD PRESSURE: 144 MMHG | BODY MASS INDEX: 38.46 KG/M2 | TEMPERATURE: 97.5 F | DIASTOLIC BLOOD PRESSURE: 87 MMHG

## 2020-05-21 LAB
-: NORMAL
ABSOLUTE EOS #: 0.42 K/UL (ref 0–0.44)
ABSOLUTE IMMATURE GRANULOCYTE: 0.04 K/UL (ref 0–0.3)
ABSOLUTE LYMPH #: 3.02 K/UL (ref 1.1–3.7)
ABSOLUTE MONO #: 0.38 K/UL (ref 0.1–1.2)
ALBUMIN SERPL-MCNC: 4.3 G/DL (ref 3.5–5.2)
ALBUMIN/GLOBULIN RATIO: 1.1 (ref 1–2.5)
ALP BLD-CCNC: 46 U/L (ref 35–104)
ALT SERPL-CCNC: 13 U/L (ref 5–33)
AMORPHOUS: NORMAL
ANION GAP SERPL CALCULATED.3IONS-SCNC: 12 MMOL/L (ref 9–17)
AST SERPL-CCNC: 12 U/L
BACTERIA: NORMAL
BASOPHILS # BLD: 0 % (ref 0–2)
BASOPHILS ABSOLUTE: 0.03 K/UL (ref 0–0.2)
BILIRUB SERPL-MCNC: <0.1 MG/DL (ref 0.3–1.2)
BILIRUBIN URINE: NEGATIVE
BUN BLDV-MCNC: 12 MG/DL (ref 6–20)
BUN/CREAT BLD: ABNORMAL (ref 9–20)
CALCIUM SERPL-MCNC: 9.8 MG/DL (ref 8.6–10.4)
CASTS UA: NORMAL /LPF (ref 0–8)
CHLORIDE BLD-SCNC: 100 MMOL/L (ref 98–107)
CO2: 26 MMOL/L (ref 20–31)
COLOR: YELLOW
CREAT SERPL-MCNC: 0.8 MG/DL (ref 0.5–0.9)
CRYSTALS, UA: NORMAL /HPF
DIFFERENTIAL TYPE: NORMAL
EOSINOPHILS RELATIVE PERCENT: 4 % (ref 1–4)
EPITHELIAL CELLS UA: NORMAL /HPF (ref 0–5)
GFR AFRICAN AMERICAN: >60 ML/MIN
GFR NON-AFRICAN AMERICAN: >60 ML/MIN
GFR SERPL CREATININE-BSD FRML MDRD: ABNORMAL ML/MIN/{1.73_M2}
GFR SERPL CREATININE-BSD FRML MDRD: ABNORMAL ML/MIN/{1.73_M2}
GLUCOSE BLD-MCNC: 121 MG/DL (ref 70–99)
GLUCOSE URINE: NEGATIVE
HCT VFR BLD CALC: 42.4 % (ref 36.3–47.1)
HEMOGLOBIN: 13.6 G/DL (ref 11.9–15.1)
IMMATURE GRANULOCYTES: 0 %
KETONES, URINE: NEGATIVE
LEUKOCYTE ESTERASE, URINE: NEGATIVE
LYMPHOCYTES # BLD: 30 % (ref 24–43)
MCH RBC QN AUTO: 28.5 PG (ref 25.2–33.5)
MCHC RBC AUTO-ENTMCNC: 32.1 G/DL (ref 28.4–34.8)
MCV RBC AUTO: 88.7 FL (ref 82.6–102.9)
MONOCYTES # BLD: 4 % (ref 3–12)
MUCUS: NORMAL
NITRITE, URINE: NEGATIVE
NRBC AUTOMATED: 0 PER 100 WBC
OTHER OBSERVATIONS UA: NORMAL
PDW BLD-RTO: 13.9 % (ref 11.8–14.4)
PH UA: 6.5 (ref 5–8)
PLATELET # BLD: 324 K/UL (ref 138–453)
PLATELET ESTIMATE: NORMAL
PMV BLD AUTO: 9.4 FL (ref 8.1–13.5)
POTASSIUM SERPL-SCNC: 4.1 MMOL/L (ref 3.7–5.3)
PROTEIN UA: NEGATIVE
RBC # BLD: 4.78 M/UL (ref 3.95–5.11)
RBC # BLD: NORMAL 10*6/UL
RBC UA: NORMAL /HPF (ref 0–4)
RENAL EPITHELIAL, UA: NORMAL /HPF
SEG NEUTROPHILS: 62 % (ref 36–65)
SEGMENTED NEUTROPHILS ABSOLUTE COUNT: 6.14 K/UL (ref 1.5–8.1)
SODIUM BLD-SCNC: 138 MMOL/L (ref 135–144)
SPECIFIC GRAVITY UA: 1.03 (ref 1–1.03)
TOTAL PROTEIN: 8.2 G/DL (ref 6.4–8.3)
TRICHOMONAS: NORMAL
TURBIDITY: CLEAR
URINE HGB: NEGATIVE
UROBILINOGEN, URINE: NORMAL
WBC # BLD: 10 K/UL (ref 3.5–11.3)
WBC # BLD: NORMAL 10*3/UL
WBC UA: NORMAL /HPF (ref 0–5)
YEAST: NORMAL

## 2020-05-21 PROCEDURE — 85025 COMPLETE CBC W/AUTO DIFF WBC: CPT

## 2020-05-21 PROCEDURE — 6360000002 HC RX W HCPCS: Performed by: STUDENT IN AN ORGANIZED HEALTH CARE EDUCATION/TRAINING PROGRAM

## 2020-05-21 PROCEDURE — 96374 THER/PROPH/DIAG INJ IV PUSH: CPT

## 2020-05-21 PROCEDURE — 2580000003 HC RX 258: Performed by: STUDENT IN AN ORGANIZED HEALTH CARE EDUCATION/TRAINING PROGRAM

## 2020-05-21 PROCEDURE — 99284 EMERGENCY DEPT VISIT MOD MDM: CPT

## 2020-05-21 PROCEDURE — 80053 COMPREHEN METABOLIC PANEL: CPT

## 2020-05-21 PROCEDURE — 74177 CT ABD & PELVIS W/CONTRAST: CPT

## 2020-05-21 PROCEDURE — 81001 URINALYSIS AUTO W/SCOPE: CPT

## 2020-05-21 PROCEDURE — 6360000004 HC RX CONTRAST MEDICATION: Performed by: STUDENT IN AN ORGANIZED HEALTH CARE EDUCATION/TRAINING PROGRAM

## 2020-05-21 RX ORDER — MORPHINE SULFATE 4 MG/ML
4 INJECTION, SOLUTION INTRAMUSCULAR; INTRAVENOUS ONCE
Status: COMPLETED | OUTPATIENT
Start: 2020-05-21 | End: 2020-05-21

## 2020-05-21 RX ORDER — SODIUM CHLORIDE, SODIUM LACTATE, POTASSIUM CHLORIDE, AND CALCIUM CHLORIDE .6; .31; .03; .02 G/100ML; G/100ML; G/100ML; G/100ML
1000 INJECTION, SOLUTION INTRAVENOUS ONCE
Status: COMPLETED | OUTPATIENT
Start: 2020-05-21 | End: 2020-05-21

## 2020-05-21 RX ADMIN — IOHEXOL 75 ML: 350 INJECTION, SOLUTION INTRAVENOUS at 19:21

## 2020-05-21 RX ADMIN — SODIUM CHLORIDE, POTASSIUM CHLORIDE, SODIUM LACTATE AND CALCIUM CHLORIDE 1000 ML: 600; 310; 30; 20 INJECTION, SOLUTION INTRAVENOUS at 19:01

## 2020-05-21 RX ADMIN — MORPHINE SULFATE 4 MG: 4 INJECTION INTRAVENOUS at 19:01

## 2020-05-21 ASSESSMENT — PAIN DESCRIPTION - DESCRIPTORS
DESCRIPTORS: ACHING
DESCRIPTORS: ACHING;CRAMPING;DISCOMFORT

## 2020-05-21 ASSESSMENT — ENCOUNTER SYMPTOMS
NAUSEA: 1
VOMITING: 1
ABDOMINAL PAIN: 1
SHORTNESS OF BREATH: 0
CHEST TIGHTNESS: 0

## 2020-05-21 ASSESSMENT — PAIN SCALES - GENERAL
PAINLEVEL_OUTOF10: 9
PAINLEVEL_OUTOF10: 8

## 2020-05-21 ASSESSMENT — PAIN DESCRIPTION - FREQUENCY
FREQUENCY: CONTINUOUS
FREQUENCY: CONTINUOUS

## 2020-05-21 ASSESSMENT — PAIN DESCRIPTION - LOCATION
LOCATION: ABDOMEN
LOCATION: ABDOMEN;BACK

## 2020-05-21 ASSESSMENT — PAIN DESCRIPTION - ORIENTATION: ORIENTATION: LOWER

## 2020-05-21 ASSESSMENT — PAIN DESCRIPTION - PAIN TYPE: TYPE: ACUTE PAIN

## 2020-05-21 NOTE — ED PROVIDER NOTES
[facility]  Emergency Department  Faculty Attestation     I performed a history and physical examination of the patient and discussed management with the resident. I reviewed the residents note and agree with the documented findings and plan of care. Any areas of disagreement are noted on the chart. I was personally present for the key portions of any procedures. I have documented in the chart those procedures where I was not present during the key portions. I have reviewed the emergency nurses triage note. I agree with the chief complaint, past medical history, past surgical history, allergies, medications, social and family history as documented unless otherwise noted below. For Physician Assistant/ Nurse Practitioner cases/documentation I have personally evaluated this patient and have completed at least one if not all key elements of the E/M (history, physical exam, and MDM). Additional findings are as noted. Primary Care Physician:  Binu Rosa MD    Screenings:  [unfilled]    CHIEF COMPLAINT       Chief Complaint   Patient presents with    Abdominal Pain       RECENT VITALS:   Temp: 97.5 °F (36.4 °C),  Pulse: 92, Resp: 18, BP: (!) 144/87    LABS:  Labs Reviewed   COMPREHENSIVE METABOLIC PANEL - Abnormal; Notable for the following components:       Result Value    Glucose 121 (*)     Total Bilirubin <0.10 (*)     All other components within normal limits   CBC WITH AUTO DIFFERENTIAL   URINALYSIS WITH MICROSCOPIC       Radiology  CT ABDOMEN PELVIS W IV CONTRAST Additional Contrast? None    (Results Pending)       EKG:    Attending Physician Additional  Notes    Patient has diffuse lower abdominal pain crampy in nature with nausea and multiple episodes of vomiting. No fevers chest pain shortness of breath. No covert exposures. No other COVID symptoms. She has a history of perforated diverticulitis with prior surgery and then reanastomosis.   She is had hysterectomy. On exam she is uncomfortable but nontoxic hypertensive but afebrile. Abdomen is slightly distended minimal diffuse lower abdomen tenderness without true rebound or guarding. Impression is abdominal pain, vomiting, rule out diverticulitis or other cause. Plan is IV fluids laboratory studies CT imaging analgesics antiemetics and reassess. Maribel Garcia.  Chaparro Burnette MD, [address]  Attending Emergency  Physician                Mendy Carrera MD  05/21/20 2154

## 2020-05-22 NOTE — ED NOTES
Discharge instructions given. Verbalized understanding. All questions answered.   Verbalizes feels better compared to when arriving to [address] [place] [address]  05/21/20 2054

## 2020-05-22 NOTE — ED PROVIDER NOTES
Vitamins-Minerals (HM COMPLETE 50+) TABS One tablet QD 10/21/19  Yes Jose Armando Niño MD   estradiol (ESTRACE) 1 MG tablet Take 1 mg by mouth daily 7/9/19  Yes Historical Provider, MD   Sodium Phosphates (FLEET) 7-19 GM/118ML Place 1 enema rectally daily as needed (constipation). 3/16/15  Yes Rigo Bernstein,    CLEARLAX powder MIX 17 GRAMS IN LIQUID AND DRINK BY MOUTH DAILY 3/27/20   Jose Armando Niño MD   albuterol sulfate  (90 Base) MCG/ACT inhaler INHALE TWO (2) PUFFS BY MOUTH FOUR TIMES A DAY AS NEEDED FOR WHEEZING 3/27/20   Jose Armando Niño MD   meclizine (ANTIVERT) 12.5 MG tablet TAKE 1 TABLET BY MOUTH THREE TIMES DAILY AS NEEDED FOR DIZZINESS OR NAUSEA 11/21/19   Jose Armando Niño MD   sertraline (ZOLOFT) 100 MG tablet Take 100 mg by mouth    Historical Provider, MD   prazosin (MINIPRESS) 2 MG capsule  7/6/18   Historical Provider, MD   butenafine (LOTRIMIN ULTRA) 1 % CREA Apply 1 Tube topically as needed (apply to affected area as needed) 7/27/18   Jacky Pastor DO   traZODone (DESYREL) 100 MG tablet Take 200 mg by mouth nightly as needed for Sleep    Historical Provider, MD       REVIEW OF SYSTEMS    (2-9 systems for level 4, 10 or more for level 5)      Review of Systems   Constitutional: Negative for diaphoresis, fatigue and fever. Respiratory: Negative for chest tightness and shortness of breath. Cardiovascular: Negative for chest pain and palpitations. Gastrointestinal: Positive for abdominal pain, nausea and vomiting. Genitourinary: Positive for dyspareunia. Negative for difficulty urinating, dysuria and flank pain. Musculoskeletal: Negative for arthralgias and joint swelling. PHYSICAL EXAM   (up to 7 for level 4, 8 or more for level 5)      INITIAL VITALS:   BP (!) 144/87   Pulse 92   Temp 97.5 °F (36.4 °C) (Oral)   Resp 18   Ht 5' 2\" (1.575 m)   Wt 209 lb (94.8 kg)   SpO2 97%   BMI 38.23 kg/m²     Physical Exam  Constitutional:       General: She is not in acute distress. 0 - 2 %    Immature Granulocytes 0 0 %    Segs Absolute 6.14 1.50 - 8.10 k/uL    Absolute Lymph # 3.02 1.10 - 3.70 k/uL    Absolute Mono # 0.38 0.10 - 1.20 k/uL    Absolute Eos # 0.42 0.00 - 0.44 k/uL    Basophils Absolute 0.03 0.00 - 0.20 k/uL    Absolute Immature Granulocyte 0.04 0.00 - 0.30 k/uL    WBC Morphology NOT REPORTED     RBC Morphology NOT REPORTED     Platelet Estimate NOT REPORTED    Comprehensive Metabolic Panel   Result Value Ref Range    Glucose 121 (H) 70 - 99 mg/dL    BUN 12 6 - 20 mg/dL    CREATININE 0.80 0.50 - 0.90 mg/dL    Bun/Cre Ratio NOT REPORTED 9 - 20    Calcium 9.8 8.6 - 10.4 mg/dL    Sodium 138 135 - 144 mmol/L    Potassium 4.1 3.7 - 5.3 mmol/L    Chloride 100 98 - 107 mmol/L    CO2 26 20 - 31 mmol/L    Anion Gap 12 9 - 17 mmol/L    Alkaline Phosphatase 46 35 - 104 U/L    ALT 13 5 - 33 U/L    AST 12 <32 U/L    Total Bilirubin <0.10 (L) 0.3 - 1.2 mg/dL    Total Protein 8.2 6.4 - 8.3 g/dL    Alb 4.3 3.5 - 5.2 g/dL    Albumin/Globulin Ratio 1.1 1.0 - 2.5    GFR Non-African American >60 >60 mL/min    GFR African American >60 >60 mL/min    GFR Comment          GFR Staging NOT REPORTED    Urinalysis with Microscopic   Result Value Ref Range    Color, UA YELLOW YELLOW    Turbidity UA CLEAR CLEAR    Glucose, Ur NEGATIVE NEGATIVE    Bilirubin Urine NEGATIVE NEGATIVE    Ketones, Urine NEGATIVE NEGATIVE    Specific Gravity, UA 1.027 1.005 - 1.030    Urine Hgb NEGATIVE NEGATIVE    pH, UA 6.5 5.0 - 8.0    Protein, UA NEGATIVE NEGATIVE    Urobilinogen, Urine Normal Normal    Nitrite, Urine NEGATIVE NEGATIVE    Leukocyte Esterase, Urine NEGATIVE NEGATIVE    -          WBC, UA None 0 - 5 /HPF    RBC, UA None 0 - 4 /HPF    Casts UA NOT REPORTED 0 - 8 /LPF    Crystals, UA NOT REPORTED None /HPF    Epithelial Cells UA None 0 - 5 /HPF    Renal Epithelial, UA NOT REPORTED 0 /HPF    Bacteria, UA NOT REPORTED None    Mucus, UA NOT REPORTED None    Trichomonas, UA NOT REPORTED None    Amorphous, UA NOT REPORTED None    Other Observations UA NOT REPORTED NOT REQ. Yeast, UA NOT REPORTED None     RADIOLOGY:  CT ABDOMEN PELVIS W IV CONTRAST Additional Contrast? None   Final Result   Fat containing ventral wall hernia in the upper abdomen midline. EMERGENCY DEPARTMENT COURSE:  Patient with complicated surgical history here with lower abdominal pain nausea vomiting dyspareunia no fevers chills cough myalgias. Vitals within normal limits, abdominal exam with severe reported tenderness suprapubically. Given complicated trickle history CT abdomen was performed which showed a known fat-containing ventral hernia that was not the location of the pain. Labs and urinalysis not concerning for infection. Possible gastritis. Patient with symptom improvement with LR and morphine. Will discharge to home. Patient has Zofran at home tolerating p.o. PROCEDURES:  None    CONSULTS:  None    CRITICAL CARE:  None    FINAL IMPRESSION      1. Lower abdominal pain    2.  Non-intractable vomiting with nausea, unspecified vomiting type          DISPOSITION / PLAN     DISPOSITION Decision To Discharge 05/21/2020 08:33:10 PM      PATIENT REFERRED TO:  Binu Rosa MD  [facility] [address]    Call   As needed      [address]:  Discharge Medication List as of 5/21/2020  8:34 PM          Vandana Farmer MD  Emergency Medicine Resident    (Please note that portions of thisnote were completed with a voice recognition program.  Efforts were made to edit the dictations but occasionally words are mis-transcribed.)       Vandana Farmer MD  Resident  05/21/20 3352

## 2020-06-25 ENCOUNTER — APPOINTMENT (OUTPATIENT)
Dept: CT IMAGING | Age: 50
DRG: 247 | End: 2020-06-25
Payer: COMMERCIAL

## 2020-06-25 ENCOUNTER — HOSPITAL ENCOUNTER (INPATIENT)
Age: 50
LOS: 6 days | Discharge: HOME HEALTH | DRG: 247 | End: 2020-07-01
Attending: EMERGENCY MEDICINE | Admitting: SURGERY
Payer: COMMERCIAL

## 2020-06-25 ENCOUNTER — APPOINTMENT (OUTPATIENT)
Dept: GENERAL RADIOLOGY | Age: 50
DRG: 247 | End: 2020-06-25
Payer: COMMERCIAL

## 2020-06-25 PROBLEM — K56.609 SMALL BOWEL OBSTRUCTION (HCC): Status: ACTIVE | Noted: 2020-06-25

## 2020-06-25 LAB
-: ABNORMAL
ABSOLUTE EOS #: <0.03 K/UL (ref 0–0.44)
ABSOLUTE IMMATURE GRANULOCYTE: 0.05 K/UL (ref 0–0.3)
ABSOLUTE LYMPH #: 2.22 K/UL (ref 1.1–3.7)
ABSOLUTE MONO #: 0.44 K/UL (ref 0.1–1.2)
ALBUMIN SERPL-MCNC: 4.9 G/DL (ref 3.5–5.2)
ALBUMIN/GLOBULIN RATIO: 1.3 (ref 1–2.5)
ALP BLD-CCNC: 48 U/L (ref 35–104)
ALT SERPL-CCNC: 15 U/L (ref 5–33)
AMORPHOUS: ABNORMAL
ANION GAP SERPL CALCULATED.3IONS-SCNC: 17 MMOL/L (ref 9–17)
AST SERPL-CCNC: 17 U/L
BACTERIA: ABNORMAL
BASOPHILS # BLD: 0 % (ref 0–2)
BASOPHILS ABSOLUTE: <0.03 K/UL (ref 0–0.2)
BILIRUB SERPL-MCNC: 0.33 MG/DL (ref 0.3–1.2)
BILIRUBIN URINE: NEGATIVE
BUN BLDV-MCNC: 8 MG/DL (ref 6–20)
BUN/CREAT BLD: ABNORMAL (ref 9–20)
CALCIUM SERPL-MCNC: 10.4 MG/DL (ref 8.6–10.4)
CASTS UA: ABNORMAL /LPF (ref 0–8)
CHLORIDE BLD-SCNC: 101 MMOL/L (ref 98–107)
CO2: 23 MMOL/L (ref 20–31)
COLOR: YELLOW
CREAT SERPL-MCNC: 0.73 MG/DL (ref 0.5–0.9)
CRYSTALS, UA: ABNORMAL /HPF
DIFFERENTIAL TYPE: ABNORMAL
EOSINOPHILS RELATIVE PERCENT: 0 % (ref 1–4)
EPITHELIAL CELLS UA: ABNORMAL /HPF (ref 0–5)
GFR AFRICAN AMERICAN: >60 ML/MIN
GFR NON-AFRICAN AMERICAN: >60 ML/MIN
GFR SERPL CREATININE-BSD FRML MDRD: ABNORMAL ML/MIN/{1.73_M2}
GFR SERPL CREATININE-BSD FRML MDRD: ABNORMAL ML/MIN/{1.73_M2}
GLUCOSE BLD-MCNC: 142 MG/DL (ref 70–99)
GLUCOSE URINE: NEGATIVE
HCG QUALITATIVE: NEGATIVE
HCT VFR BLD CALC: 46 % (ref 36.3–47.1)
HEMOGLOBIN: 14.7 G/DL (ref 11.9–15.1)
IMMATURE GRANULOCYTES: 0 %
KETONES, URINE: NEGATIVE
LACTIC ACID, SEPSIS WHOLE BLOOD: 1.7 MMOL/L (ref 0.5–1.9)
LACTIC ACID, SEPSIS WHOLE BLOOD: 2.5 MMOL/L (ref 0.5–1.9)
LACTIC ACID, SEPSIS: ABNORMAL MMOL/L (ref 0.5–1.9)
LACTIC ACID, SEPSIS: NORMAL MMOL/L (ref 0.5–1.9)
LEUKOCYTE ESTERASE, URINE: NEGATIVE
LIPASE: 43 U/L (ref 13–60)
LYMPHOCYTES # BLD: 19 % (ref 24–43)
MCH RBC QN AUTO: 27.8 PG (ref 25.2–33.5)
MCHC RBC AUTO-ENTMCNC: 32 G/DL (ref 28.4–34.8)
MCV RBC AUTO: 87 FL (ref 82.6–102.9)
MONOCYTES # BLD: 4 % (ref 3–12)
MUCUS: ABNORMAL
NITRITE, URINE: NEGATIVE
NRBC AUTOMATED: 0 PER 100 WBC
OTHER OBSERVATIONS UA: ABNORMAL
PDW BLD-RTO: 14.3 % (ref 11.8–14.4)
PH UA: >9 (ref 5–8)
PLATELET # BLD: 321 K/UL (ref 138–453)
PLATELET ESTIMATE: ABNORMAL
PMV BLD AUTO: 10.2 FL (ref 8.1–13.5)
POTASSIUM SERPL-SCNC: 4.2 MMOL/L (ref 3.7–5.3)
PROTEIN UA: ABNORMAL
RBC # BLD: 5.29 M/UL (ref 3.95–5.11)
RBC # BLD: ABNORMAL 10*6/UL
RBC UA: ABNORMAL /HPF (ref 0–4)
RENAL EPITHELIAL, UA: ABNORMAL /HPF
SEG NEUTROPHILS: 77 % (ref 36–65)
SEGMENTED NEUTROPHILS ABSOLUTE COUNT: 8.97 K/UL (ref 1.5–8.1)
SODIUM BLD-SCNC: 141 MMOL/L (ref 135–144)
SPECIFIC GRAVITY UA: 1.07 (ref 1–1.03)
TOTAL PROTEIN: 8.6 G/DL (ref 6.4–8.3)
TRICHOMONAS: ABNORMAL
TURBIDITY: CLEAR
URINE HGB: NEGATIVE
UROBILINOGEN, URINE: NORMAL
WBC # BLD: 11.7 K/UL (ref 3.5–11.3)
WBC # BLD: ABNORMAL 10*3/UL
WBC UA: ABNORMAL /HPF (ref 0–5)
YEAST: ABNORMAL

## 2020-06-25 PROCEDURE — 6360000004 HC RX CONTRAST MEDICATION: Performed by: EMERGENCY MEDICINE

## 2020-06-25 PROCEDURE — 74018 RADEX ABDOMEN 1 VIEW: CPT

## 2020-06-25 PROCEDURE — 83605 ASSAY OF LACTIC ACID: CPT

## 2020-06-25 PROCEDURE — 1200000000 HC SEMI PRIVATE

## 2020-06-25 PROCEDURE — 2500000003 HC RX 250 WO HCPCS: Performed by: STUDENT IN AN ORGANIZED HEALTH CARE EDUCATION/TRAINING PROGRAM

## 2020-06-25 PROCEDURE — 6370000000 HC RX 637 (ALT 250 FOR IP): Performed by: EMERGENCY MEDICINE

## 2020-06-25 PROCEDURE — 81001 URINALYSIS AUTO W/SCOPE: CPT

## 2020-06-25 PROCEDURE — 2580000003 HC RX 258: Performed by: EMERGENCY MEDICINE

## 2020-06-25 PROCEDURE — 2580000003 HC RX 258: Performed by: STUDENT IN AN ORGANIZED HEALTH CARE EDUCATION/TRAINING PROGRAM

## 2020-06-25 PROCEDURE — 6360000002 HC RX W HCPCS: Performed by: STUDENT IN AN ORGANIZED HEALTH CARE EDUCATION/TRAINING PROGRAM

## 2020-06-25 PROCEDURE — 84703 CHORIONIC GONADOTROPIN ASSAY: CPT

## 2020-06-25 PROCEDURE — 80053 COMPREHEN METABOLIC PANEL: CPT

## 2020-06-25 PROCEDURE — 96376 TX/PRO/DX INJ SAME DRUG ADON: CPT

## 2020-06-25 PROCEDURE — 6360000002 HC RX W HCPCS: Performed by: EMERGENCY MEDICINE

## 2020-06-25 PROCEDURE — 96374 THER/PROPH/DIAG INJ IV PUSH: CPT

## 2020-06-25 PROCEDURE — 85025 COMPLETE CBC W/AUTO DIFF WBC: CPT

## 2020-06-25 PROCEDURE — 74177 CT ABD & PELVIS W/CONTRAST: CPT

## 2020-06-25 PROCEDURE — 83690 ASSAY OF LIPASE: CPT

## 2020-06-25 PROCEDURE — 96375 TX/PRO/DX INJ NEW DRUG ADDON: CPT

## 2020-06-25 PROCEDURE — 96361 HYDRATE IV INFUSION ADD-ON: CPT

## 2020-06-25 PROCEDURE — 96372 THER/PROPH/DIAG INJ SC/IM: CPT

## 2020-06-25 PROCEDURE — 99285 EMERGENCY DEPT VISIT HI MDM: CPT

## 2020-06-25 RX ORDER — MORPHINE SULFATE 2 MG/ML
2 INJECTION, SOLUTION INTRAMUSCULAR; INTRAVENOUS
Status: DISCONTINUED | OUTPATIENT
Start: 2020-06-25 | End: 2020-06-27

## 2020-06-25 RX ORDER — ONDANSETRON 2 MG/ML
4 INJECTION INTRAMUSCULAR; INTRAVENOUS ONCE
Status: COMPLETED | OUTPATIENT
Start: 2020-06-25 | End: 2020-06-25

## 2020-06-25 RX ORDER — ONDANSETRON 2 MG/ML
4 INJECTION INTRAMUSCULAR; INTRAVENOUS EVERY 6 HOURS PRN
Status: DISCONTINUED | OUTPATIENT
Start: 2020-06-25 | End: 2020-07-01 | Stop reason: HOSPADM

## 2020-06-25 RX ORDER — DICYCLOMINE HYDROCHLORIDE 10 MG/ML
20 INJECTION INTRAMUSCULAR ONCE
Status: COMPLETED | OUTPATIENT
Start: 2020-06-25 | End: 2020-06-25

## 2020-06-25 RX ORDER — MORPHINE SULFATE 4 MG/ML
4 INJECTION, SOLUTION INTRAMUSCULAR; INTRAVENOUS
Status: DISCONTINUED | OUTPATIENT
Start: 2020-06-25 | End: 2020-06-27

## 2020-06-25 RX ORDER — MORPHINE SULFATE 4 MG/ML
4 INJECTION, SOLUTION INTRAMUSCULAR; INTRAVENOUS ONCE
Status: COMPLETED | OUTPATIENT
Start: 2020-06-25 | End: 2020-06-25

## 2020-06-25 RX ORDER — 0.9 % SODIUM CHLORIDE 0.9 %
1000 INTRAVENOUS SOLUTION INTRAVENOUS ONCE
Status: DISCONTINUED | OUTPATIENT
Start: 2020-06-25 | End: 2020-07-01 | Stop reason: HOSPADM

## 2020-06-25 RX ORDER — SODIUM CHLORIDE, SODIUM LACTATE, POTASSIUM CHLORIDE, AND CALCIUM CHLORIDE .6; .31; .03; .02 G/100ML; G/100ML; G/100ML; G/100ML
1000 INJECTION, SOLUTION INTRAVENOUS ONCE
Status: COMPLETED | OUTPATIENT
Start: 2020-06-25 | End: 2020-06-25

## 2020-06-25 RX ORDER — HYDRALAZINE HYDROCHLORIDE 20 MG/ML
10 INJECTION INTRAMUSCULAR; INTRAVENOUS EVERY 6 HOURS PRN
Status: DISCONTINUED | OUTPATIENT
Start: 2020-06-25 | End: 2020-07-01 | Stop reason: HOSPADM

## 2020-06-25 RX ORDER — MAGNESIUM HYDROXIDE/ALUMINUM HYDROXICE/SIMETHICONE 120; 1200; 1200 MG/30ML; MG/30ML; MG/30ML
30 SUSPENSION ORAL ONCE
Status: COMPLETED | OUTPATIENT
Start: 2020-06-25 | End: 2020-06-25

## 2020-06-25 RX ORDER — SODIUM CHLORIDE 0.9 % (FLUSH) 0.9 %
10 SYRINGE (ML) INJECTION PRN
Status: DISCONTINUED | OUTPATIENT
Start: 2020-06-25 | End: 2020-07-01 | Stop reason: HOSPADM

## 2020-06-25 RX ORDER — SODIUM CHLORIDE, SODIUM LACTATE, POTASSIUM CHLORIDE, CALCIUM CHLORIDE 600; 310; 30; 20 MG/100ML; MG/100ML; MG/100ML; MG/100ML
INJECTION, SOLUTION INTRAVENOUS CONTINUOUS
Status: DISCONTINUED | OUTPATIENT
Start: 2020-06-25 | End: 2020-06-28

## 2020-06-25 RX ORDER — ALBUTEROL SULFATE 2.5 MG/3ML
2.5 SOLUTION RESPIRATORY (INHALATION) EVERY 4 HOURS PRN
Status: DISCONTINUED | OUTPATIENT
Start: 2020-06-25 | End: 2020-07-01 | Stop reason: HOSPADM

## 2020-06-25 RX ORDER — 0.9 % SODIUM CHLORIDE 0.9 %
1000 INTRAVENOUS SOLUTION INTRAVENOUS ONCE
Status: COMPLETED | OUTPATIENT
Start: 2020-06-25 | End: 2020-06-25

## 2020-06-25 RX ORDER — ACETAMINOPHEN 10 MG/ML
1000 INJECTION, SOLUTION INTRAVENOUS EVERY 8 HOURS
Status: DISCONTINUED | OUTPATIENT
Start: 2020-06-25 | End: 2020-06-27

## 2020-06-25 RX ORDER — LABETALOL 20 MG/4 ML (5 MG/ML) INTRAVENOUS SYRINGE
10 EVERY 6 HOURS
Status: DISCONTINUED | OUTPATIENT
Start: 2020-06-25 | End: 2020-06-27

## 2020-06-25 RX ORDER — SODIUM CHLORIDE 0.9 % (FLUSH) 0.9 %
10 SYRINGE (ML) INJECTION EVERY 12 HOURS SCHEDULED
Status: DISCONTINUED | OUTPATIENT
Start: 2020-06-25 | End: 2020-07-01 | Stop reason: HOSPADM

## 2020-06-25 RX ORDER — FAMOTIDINE 20 MG/1
20 TABLET, FILM COATED ORAL ONCE
Status: COMPLETED | OUTPATIENT
Start: 2020-06-25 | End: 2020-06-25

## 2020-06-25 RX ADMIN — FAMOTIDINE 20 MG: 10 INJECTION, SOLUTION INTRAVENOUS at 13:39

## 2020-06-25 RX ADMIN — SODIUM CHLORIDE, POTASSIUM CHLORIDE, SODIUM LACTATE AND CALCIUM CHLORIDE: 600; 310; 30; 20 INJECTION, SOLUTION INTRAVENOUS at 12:48

## 2020-06-25 RX ADMIN — MORPHINE SULFATE 4 MG: 4 INJECTION INTRAVENOUS at 14:45

## 2020-06-25 RX ADMIN — ALUMINUM HYDROXIDE, MAGNESIUM HYDROXIDE, AND SIMETHICONE 30 ML: 200; 200; 20 SUSPENSION ORAL at 07:05

## 2020-06-25 RX ADMIN — SODIUM CHLORIDE, POTASSIUM CHLORIDE, SODIUM LACTATE AND CALCIUM CHLORIDE: 600; 310; 30; 20 INJECTION, SOLUTION INTRAVENOUS at 15:27

## 2020-06-25 RX ADMIN — ACETAMINOPHEN 1000 MG: 10 INJECTION, SOLUTION INTRAVENOUS at 20:51

## 2020-06-25 RX ADMIN — MORPHINE SULFATE 4 MG: 4 INJECTION INTRAVENOUS at 20:43

## 2020-06-25 RX ADMIN — ENOXAPARIN SODIUM 40 MG: 40 INJECTION SUBCUTANEOUS at 13:54

## 2020-06-25 RX ADMIN — FAMOTIDINE 20 MG: 10 INJECTION, SOLUTION INTRAVENOUS at 20:32

## 2020-06-25 RX ADMIN — MORPHINE SULFATE 4 MG: 4 INJECTION INTRAVENOUS at 07:05

## 2020-06-25 RX ADMIN — MORPHINE SULFATE 4 MG: 4 INJECTION INTRAVENOUS at 10:48

## 2020-06-25 RX ADMIN — ONDANSETRON 4 MG: 2 INJECTION INTRAMUSCULAR; INTRAVENOUS at 07:05

## 2020-06-25 RX ADMIN — SODIUM CHLORIDE, POTASSIUM CHLORIDE, SODIUM LACTATE AND CALCIUM CHLORIDE 1000 ML: 600; 310; 30; 20 INJECTION, SOLUTION INTRAVENOUS at 10:17

## 2020-06-25 RX ADMIN — MORPHINE SULFATE 4 MG: 4 INJECTION INTRAVENOUS at 09:05

## 2020-06-25 RX ADMIN — MORPHINE SULFATE 4 MG: 4 INJECTION INTRAVENOUS at 12:44

## 2020-06-25 RX ADMIN — SODIUM CHLORIDE 1000 ML: 9 INJECTION, SOLUTION INTRAVENOUS at 07:04

## 2020-06-25 RX ADMIN — MORPHINE SULFATE 4 MG: 4 INJECTION INTRAVENOUS at 17:49

## 2020-06-25 RX ADMIN — ONDANSETRON 4 MG: 2 INJECTION INTRAMUSCULAR; INTRAVENOUS at 20:43

## 2020-06-25 RX ADMIN — FAMOTIDINE 20 MG: 20 TABLET, FILM COATED ORAL at 07:05

## 2020-06-25 RX ADMIN — DICYCLOMINE HYDROCHLORIDE 20 MG: 10 INJECTION INTRAMUSCULAR at 07:10

## 2020-06-25 RX ADMIN — IOHEXOL 75 ML: 350 INJECTION, SOLUTION INTRAVENOUS at 08:00

## 2020-06-25 ASSESSMENT — PAIN DESCRIPTION - PROGRESSION
CLINICAL_PROGRESSION: NOT CHANGED

## 2020-06-25 ASSESSMENT — PAIN DESCRIPTION - ORIENTATION
ORIENTATION: LEFT;RIGHT;MID
ORIENTATION: MID;UPPER

## 2020-06-25 ASSESSMENT — PAIN DESCRIPTION - ONSET: ONSET: ON-GOING

## 2020-06-25 ASSESSMENT — PAIN SCALES - GENERAL
PAINLEVEL_OUTOF10: 8
PAINLEVEL_OUTOF10: 7
PAINLEVEL_OUTOF10: 4
PAINLEVEL_OUTOF10: 10
PAINLEVEL_OUTOF10: 10
PAINLEVEL_OUTOF10: 8
PAINLEVEL_OUTOF10: 8
PAINLEVEL_OUTOF10: 10
PAINLEVEL_OUTOF10: 7
PAINLEVEL_OUTOF10: 7
PAINLEVEL_OUTOF10: 10

## 2020-06-25 ASSESSMENT — PAIN DESCRIPTION - FREQUENCY: FREQUENCY: CONTINUOUS

## 2020-06-25 ASSESSMENT — PAIN DESCRIPTION - PAIN TYPE: TYPE: ACUTE PAIN

## 2020-06-25 ASSESSMENT — ENCOUNTER SYMPTOMS
COUGH: 0
NAUSEA: 1
VOMITING: 1
SHORTNESS OF BREATH: 0
ABDOMINAL PAIN: 1

## 2020-06-25 ASSESSMENT — PAIN - FUNCTIONAL ASSESSMENT: PAIN_FUNCTIONAL_ASSESSMENT: PREVENTS OR INTERFERES SOME ACTIVE ACTIVITIES AND ADLS

## 2020-06-25 ASSESSMENT — PAIN DESCRIPTION - DESCRIPTORS: DESCRIPTORS: SHARP;SHOOTING

## 2020-06-25 ASSESSMENT — PAIN DESCRIPTION - LOCATION
LOCATION: ABDOMEN
LOCATION: ABDOMEN

## 2020-06-25 NOTE — ED NOTES
Report called to [facility].  All questions answered      José Luis Sprague RN  06/25/20 8388

## 2020-06-25 NOTE — ED NOTES
Report given to [facility], RN  Verbalized no questions      Dagoberto Beebe RN  06/25/20 2891

## 2020-06-25 NOTE — ED PROVIDER NOTES
[facility]     Emergency Department     Faculty Attestation    I performed a history and physical examination of the patient and discussed management with the resident. I have reviewed and agree with the residents findings including all diagnostic interpretations, and treatment plans as written at the time of my review. Any areas of disagreement are noted on the chart. I was personally present for the key portions of any procedures. I have documented in the chart those procedures where I was not present during the key portions. For Physician Assistant/ Nurse Practitioner cases/documentation I have personally evaluated this patient and have completed at least one if not all key elements of the E/M (history, physical exam, and MDM). Additional findings are as noted. This patient was evaluated in the Emergency Department for symptoms described in the history of present illness. The patient was evaluated in the context of the global COVID-19 pandemic, which necessitated consideration that the patient might be at risk for infection with the SARS-CoV-2 virus that causes COVID-19. Institutional protocols and algorithms that pertain to the evaluation of patients at risk for COVID-19 are in a state of rapid change based on information released by regulatory bodies including the CDC and federal and state organizations. These policies and algorithms were followed during the patient's care in the ED. Primary Care Physician: Carl Bates MD    History: This is a 54 y.o. female who presents to the Emergency Department with complaint of abdominal pain with nausea and vomiting. Is been ongoing for the last several days. Patient denies any fever. Physical:   oral temperature is 98.1 °F (36.7 °C). Her blood pressure is 169/121 (abnormal) and her pulse is 117. Her respiration is 18 and oxygen saturation is 98%.   Abdomen is soft, well-healed midline surgical scar, mild diffuse tenderness no guarding or rebound, this was performed after patient received medication. Impression: Abdominal pain, nausea vomiting    Plan: IV fluid, antiemetic, CBC, CMP, lipase, lactic acid, urinalysis      (Please note that portions of this note were completed with a voice recognition program.  Efforts were made to edit the dictations but occasionally words are mis-transcribed.)    Mikala Cates.  Carly De Souza MD, [facility]  Attending Emergency Medicine Physician        Sara Day MD  06/25/20 3189

## 2020-06-25 NOTE — ED NOTES
NG tube inserted. Pt tolerated procedure well.  Updated on plan of care     Susana Nur RN  06/25/20 8556

## 2020-06-25 NOTE — ED NOTES
Patient lying on cot. Alert and Orientated x4.  Updated on plan of care     Nohemy Ron RN  06/25/20 3972

## 2020-06-25 NOTE — ED PROVIDER NOTES
MORNING 11/18/19   Bradley Dent MD   ondansetron (ZOFRAN-ODT) 4 MG disintegrating tablet Take 1 tablet by mouth 3 times daily as needed for Nausea or Vomiting 10/24/19   Bradley Dent MD   omeprazole (PRILOSEC) 20 MG delayed release capsule Take 1 capsule by mouth daily (with breakfast) 10/21/19   Bradley Dent MD   Cholecalciferol (VITAMIN D3) 2000 units CAPS Take 1 capsule by mouth daily 10/21/19   Bradley Dent MD   Multiple Vitamins-Minerals (HM COMPLETE 50+) TABS One tablet QD 10/21/19   Bradley Dent MD   estradiol (ESTRACE) 1 MG tablet Take 1 mg by mouth daily 7/9/19   Historical Provider, MD   sertraline (ZOLOFT) 100 MG tablet Take 100 mg by mouth    Historical Provider, MD   prazosin (MINIPRESS) 2 MG capsule  7/6/18   Historical Provider, MD   butenafine (LOTRIMIN ULTRA) 1 % CREA Apply 1 Tube topically as needed (apply to affected area as needed) 7/27/18   Desmond Solorzano DO   traZODone (DESYREL) 100 MG tablet Take 200 mg by mouth nightly as needed for Sleep    Historical Provider, MD   Sodium Phosphates (FLEET) 7-19 GM/118ML Place 1 enema rectally daily as needed (constipation). 3/16/15   Wendy Carpenter DO       REVIEW OF SYSTEMS    (2-9 systems for level 4, 10 or more for level 5)      Review of Systems   Constitutional: Negative for fever. Respiratory: Negative for cough and shortness of breath. Cardiovascular: Negative for chest pain. Gastrointestinal: Positive for abdominal pain, nausea and vomiting. Genitourinary: Negative for dysuria. Musculoskeletal: Negative for myalgias. PHYSICAL EXAM   (up to 7 for level 4, 8 or more for level 5)      INITIAL VITALS:   BP (!) 149/78   Pulse 117   Temp 98.1 °F (36.7 °C) (Oral)   Resp 18   SpO2 98%     Physical Exam  Vitals signs and nursing note reviewed. Constitutional:       Appearance: She is well-developed. She is not diaphoretic.       Comments: Appears uncomfortable, squirming in exam room bed   HENT:      Head: Normocephalic and EMERGENCY DEPARTMENT COURSE /MDM / DIFFERENTIAL DIAGNOSIS     LABS:  Results for orders placed or performed during the hospital encounter of 06/25/20   CBC Auto Differential   Result Value Ref Range    WBC 11.7 (H) 3.5 - 11.3 k/uL    RBC 5.29 (H) 3.95 - 5.11 m/uL    Hemoglobin 14.7 11.9 - 15.1 g/dL    Hematocrit 46.0 36.3 - 47.1 %    MCV 87.0 82.6 - 102.9 fL    MCH 27.8 25.2 - 33.5 pg    MCHC 32.0 28.4 - 34.8 g/dL    RDW 14.3 11.8 - 14.4 %    Platelets 194 778 - 785 k/uL    MPV 10.2 8.1 - 13.5 fL    NRBC Automated 0.0 0.0 per 100 WBC    Differential Type NOT REPORTED     Seg Neutrophils 77 (H) 36 - 65 %    Lymphocytes 19 (L) 24 - 43 %    Monocytes 4 3 - 12 %    Eosinophils % 0 (L) 1 - 4 %    Basophils 0 0 - 2 %    Immature Granulocytes 0 0 %    Segs Absolute 8.97 (H) 1.50 - 8.10 k/uL    Absolute Lymph # 2.22 1.10 - 3.70 k/uL    Absolute Mono # 0.44 0.10 - 1.20 k/uL    Absolute Eos # <0.03 0.00 - 0.44 k/uL    Basophils Absolute <0.03 0.00 - 0.20 k/uL    Absolute Immature Granulocyte 0.05 0.00 - 0.30 k/uL    WBC Morphology NOT REPORTED     RBC Morphology NOT REPORTED     Platelet Estimate NOT REPORTED    Comprehensive Metabolic Panel w/ Reflex to MG   Result Value Ref Range    Glucose 142 (H) 70 - 99 mg/dL    BUN 8 6 - 20 mg/dL    CREATININE 0.73 0.50 - 0.90 mg/dL    Bun/Cre Ratio NOT REPORTED 9 - 20    Calcium 10.4 8.6 - 10.4 mg/dL    Sodium 141 135 - 144 mmol/L    Potassium 4.2 3.7 - 5.3 mmol/L    Chloride 101 98 - 107 mmol/L    CO2 23 20 - 31 mmol/L    Anion Gap 17 9 - 17 mmol/L    Alkaline Phosphatase 48 35 - 104 U/L    ALT 15 5 - 33 U/L    AST 17 <32 U/L    Total Bilirubin 0.33 0.3 - 1.2 mg/dL    Total Protein 8.6 (H) 6.4 - 8.3 g/dL    Alb 4.9 3.5 - 5.2 g/dL    Albumin/Globulin Ratio 1.3 1.0 - 2.5    GFR Non-African American >60 >60 mL/min    GFR African American >60 >60 mL/min    GFR Comment          GFR Staging NOT REPORTED    Lipase   Result Value Ref Range    Lipase 43 13 - 60 U/L   HCG Qualitative, Serum Result Value Ref Range    hCG Qual NEGATIVE NEGATIVE   Lactate, Sepsis   Result Value Ref Range    Lactic Acid, Sepsis NOT REPORTED 0.5 - 1.9 mmol/L    Lactic Acid, Sepsis, Whole Blood 2.5 (H) 0.5 - 1.9 mmol/L   Urinalysis with microscopic   Result Value Ref Range    Color, UA YELLOW YELLOW    Turbidity UA CLEAR CLEAR    Glucose, Ur NEGATIVE NEGATIVE    Bilirubin Urine NEGATIVE NEGATIVE    Ketones, Urine NEGATIVE NEGATIVE    Specific Gravity, UA 1.068 (H) 1.005 - 1.030    Urine Hgb NEGATIVE NEGATIVE    pH, UA >9.0 (H) 5.0 - 8.0    Protein, UA 1+ (A) NEGATIVE    Urobilinogen, Urine Normal Normal    Nitrite, Urine NEGATIVE NEGATIVE    Leukocyte Esterase, Urine NEGATIVE NEGATIVE    -          WBC, UA None 0 - 5 /HPF    RBC, UA 5 TO 10 0 - 4 /HPF    Casts UA  0 - 8 /LPF     5 TO 10 HYALINE Reference range defined for non-centrifuged specimen. Crystals, UA NOT REPORTED None /HPF    Epithelial Cells UA 0 TO 2 0 - 5 /HPF    Renal Epithelial, UA NOT REPORTED 0 /HPF    Bacteria, UA NOT REPORTED None    Mucus, UA NOT REPORTED None    Trichomonas, UA NOT REPORTED None    Amorphous, UA NOT REPORTED None    Other Observations UA NOT REPORTED NOT REQ. Yeast, UA NOT REPORTED None       RADIOLOGY:  Ct Abdomen Pelvis W Iv Contrast Additional Contrast? None    Result Date: 6/25/2020  EXAMINATION: CT OF THE ABDOMEN AND PELVIS WITH CONTRAST 6/25/2020 7:52 am TECHNIQUE: CT of the abdomen and pelvis was performed with the administration of intravenous contrast. Multiplanar reformatted images are provided for review. Dose modulation, iterative reconstruction, and/or weight based adjustment of the mA/kV was utilized to reduce the radiation dose to as low as reasonably achievable. COMPARISON: May 21, 2020 HISTORY: ORDERING SYSTEM PROVIDED HISTORY: abdominal pain TECHNOLOGIST PROVIDED HISTORY: Abdominal pain abdominal pain FINDINGS: Lower Chest: No significant abnormality at the lung bases. Organs: No focal abnormality in the liver. correction. Kidney function is normal; no AVANI or CKD. The patient's liver function studies are within normal limits therefore doubt acute liver injury or process. The patient's lipase level is normal, therefore doubt acute pancreatitis. Patient is not pregnant. No leukocytosis. No anemia. Mild lactic acid elevation. Patient does NOT meet sepsis criteria at this time. [BJ]   [id number] requesting additional pain medicines, order additional morphine. [BJ]   0915 SBO on CT scan. Will page general surgery. [BJ]   [id number] General surgery requesting NG tube placement. [BJ]   1023 Patient with high specific gravity, concerning for dehydration - will order additional IV fluids at this time. [BJ]   1025 Patient admitted by general surgery. [BJ]      ED Course User Index  [BJ] Deandre Hernandez DO     PROCEDURES:  None    CONSULTS:  IP CONSULT TO GENERAL SURGERY    CRITICAL CARE:  Please see attending physician note.     FINAL IMPRESSION      1. SBO (small bowel obstruction) ([address]    2. Dehydration        DISPOSITION / PLAN     DISPOSITION Admitted 06/25/2020 10:24:51 AM    PATIENT REFERRED TO:  Kenyon Alanis MD  [facility] [address]  [phone]            DISCHARGE MEDICATIONS:  New Prescriptions    No medications on file       Deandre Hernandez DO  Emergency Medicine Resident    (Please note that portions ofthis note were completed with a voice recognition program.  Efforts were made to edit the dictations but occasionally words are mis-transcribed.)        Deandre Hernandez DO  Resident  06/25/20 6104

## 2020-06-25 NOTE — ED NOTES
Patient lying on cot resting. Alert and Orientated x4. Patient on phone with mom. Patient states that mom is coming to visit. Writer educated that 1 visitor is allowed but temperature must be taken.  Update on Plan of care     Jaya Mann RN  06/25/20 7038

## 2020-06-25 NOTE — H&P
General Surgery:  H&P        PATIENT NAME: Power Mac OF BIRTH: 1970    ADMISSION DATE: 2020  6:45 AM     Admitting Provider: Dr. Talisha Elliott DATE: 2020    Chief Complaint: Abdominal pain with CAT scan suggesting small bowel obstruction    HISTORY OF PRESENT ILLNESS:  The patient is a 54 y.o. female patient has a history of perforated diverticulitis status post Patrick's procedure which has been reversed with a diverting ileostomy, and the ileostomy has been taken down by surgeon at [address]. Patient presents with 3-day history of abdominal pain with increasing intensity. She states that it is a generalized abdominal pain, crampy, intermittently worse. She is not been able to eat. She is nauseous and vomiting. She is not passed any flatus or bowel movement in the last 3 days. She is never had this pain before. On presentation to the ED she had white count within normal limits, afebrile but she was tachycardic. CT scan was performed showing dilated bowel proximally of her ileostomy takedown with staple line and decompressed bowel distally. The concern is for small bowel obstruction. General surgery was consulted. On evaluation, patient was tearful and uncomfortable. Her belly was distended. She has pain in the right lower quadrant where her ileostomy takedown incision is. NG tube was in place without bilious fluid. Past Medical History:        Diagnosis Date    Bipolar disorder ([address]     Depression     Gastritis     Hypertension     IBS (irritable bowel syndrome)        Past Surgical History:        Procedure Laterality Date    ABDOMEN SURGERY      APPENDECTOMY       SECTION      x2    HYSTERECTOMY      TONSILLECTOMY      TUBAL LIGATION         Medications Prior to Admission:   Not in a hospital admission.   see epic list    Allergies:  Motrin [ibuprofen]    Social History:   Social History     Socioeconomic History    Marital status: position of the enteric device terminating in the stomach. CT ABDOMEN PELVIS W IV CONTRAST Additional Contrast? None   Final Result   Small-bowel obstruction with transition point at the small bowel anastomosis   located in the right mid abdomen. No pneumatosis or portal venous gas at   this time. ASSESSMENT:    1. Small bowel obstruction    Plan:  1. Diet: N.p.o.  2. Lactated Ringer's at 150 cc/h  3. Labs in the a.m.  4. NG tube to low intermittent wall suction  5. PRN antihypertensive IV medications  6. Will likely perform small bowel follow-through tomorrow if no improvement  7. Encourage ambulation  8. Minimize narcotics      Electronically signed by Alhaji Pierre DO  on 6/25/2020 at 10:19 AM   I attest that I was present with the resident during the patient's examination in the [address] [place] [address] emergency department and agree with the description of findings and plan as outlined above.   Cyril Maharaj MD

## 2020-06-25 NOTE — CARE COORDINATION
Case Management Initial Discharge Plan  Martha Choe,             Met with:patient to discuss discharge plans. Information verified: address, contacts, phone number, , insurance Yes    Emergency Contact/Next of Kin name & number: kam -parent  23     PCP: León Levin MD  Date of last visit: been awhile    Insurance Provider: [facility]    Discharge Planning    Living Arrangements:  Family Members   Support Systems:  Children, Family Members    Home has 2 stories  7 stairs to climb to get into front door, 1 flight stairs to climb to reach second floor  Location of bedroom/bathroom in home second floor    Patient able to perform ADL's:Independent    Current Services (outpatient & in home) none  DME equipment: 0  DME provider: 0    Receiving oral anticoagulation therapy? No    If indicated:   Physician managing anticoagulation treatment:   Where does patient obtain lab work for ATC treatment? Potential Assistance Needed:  N/A    Patient agreeable to home care: Yes  Freedom of choice provided:  yes    Prior SNF/Rehab Placement and Facility: n/a  Agreeable to SNF/Rehab: No  [place] of choice provided: n/a     Evaluation: no    Expected Discharge date:       Patient expects to be discharged to:  home  Follow Up Appointment: Best Day/ Time:      Transportation provider: not sure  Transportation arrangements needed for discharge: No    Readmission Risk              Risk of Unplanned Readmission:        10             Does patient have a readmission risk score greater than 14?: No  If yes, follow-up appointment must be made within 7 days of discharge. Goals of Care:       Discharge Plan: home with possible home care. Would like [facility] if needed.           Electronically signed by Gustavo Nobles RN on 20 at 6:11 PM EDT

## 2020-06-25 NOTE — ED NOTES
Transport at bedside taking patient to CT.  Writer updated Patient on plan of care     Chemo Singer RN  06/25/20 1942

## 2020-06-25 NOTE — ED NOTES
Patient lying on cot. Alert and Orientated x4. Patient states she is still having abdominal discomfort.  Patient updated on plan of care     Jaya Mann RN  06/25/20 3871

## 2020-06-25 NOTE — PLAN OF CARE
Pain relief with prn meds, rates pain an 8 on a scale of 0 to 10. Remains free from falls and injury, safety maintained. Call light within reach.

## 2020-06-26 ENCOUNTER — APPOINTMENT (OUTPATIENT)
Dept: GENERAL RADIOLOGY | Age: 50
DRG: 247 | End: 2020-06-26
Payer: COMMERCIAL

## 2020-06-26 LAB
ABSOLUTE EOS #: 0.05 K/UL (ref 0–0.44)
ABSOLUTE IMMATURE GRANULOCYTE: <0.03 K/UL (ref 0–0.3)
ABSOLUTE LYMPH #: 2.55 K/UL (ref 1.1–3.7)
ABSOLUTE MONO #: 0.47 K/UL (ref 0.1–1.2)
ANION GAP SERPL CALCULATED.3IONS-SCNC: 13 MMOL/L (ref 9–17)
ANION GAP SERPL CALCULATED.3IONS-SCNC: 19 MMOL/L (ref 9–17)
BASOPHILS # BLD: 0 % (ref 0–2)
BASOPHILS ABSOLUTE: <0.03 K/UL (ref 0–0.2)
BUN BLDV-MCNC: 6 MG/DL (ref 6–20)
BUN BLDV-MCNC: 7 MG/DL (ref 6–20)
BUN/CREAT BLD: ABNORMAL (ref 9–20)
BUN/CREAT BLD: ABNORMAL (ref 9–20)
CALCIUM SERPL-MCNC: 8.5 MG/DL (ref 8.6–10.4)
CALCIUM SERPL-MCNC: 8.6 MG/DL (ref 8.6–10.4)
CHLORIDE BLD-SCNC: 102 MMOL/L (ref 98–107)
CHLORIDE BLD-SCNC: 102 MMOL/L (ref 98–107)
CO2: 21 MMOL/L (ref 20–31)
CO2: 21 MMOL/L (ref 20–31)
CREAT SERPL-MCNC: 0.5 MG/DL (ref 0.5–0.9)
CREAT SERPL-MCNC: 0.51 MG/DL (ref 0.5–0.9)
DIFFERENTIAL TYPE: ABNORMAL
EOSINOPHILS RELATIVE PERCENT: 1 % (ref 1–4)
GFR AFRICAN AMERICAN: >60 ML/MIN
GFR AFRICAN AMERICAN: >60 ML/MIN
GFR NON-AFRICAN AMERICAN: >60 ML/MIN
GFR NON-AFRICAN AMERICAN: >60 ML/MIN
GFR SERPL CREATININE-BSD FRML MDRD: ABNORMAL ML/MIN/{1.73_M2}
GLUCOSE BLD-MCNC: 89 MG/DL (ref 70–99)
GLUCOSE BLD-MCNC: 96 MG/DL (ref 70–99)
HCT VFR BLD CALC: 40.8 % (ref 36.3–47.1)
HEMOGLOBIN: 12.4 G/DL (ref 11.9–15.1)
IMMATURE GRANULOCYTES: 0 %
LYMPHOCYTES # BLD: 54 % (ref 24–43)
MAGNESIUM: 1.8 MG/DL (ref 1.6–2.6)
MCH RBC QN AUTO: 27.8 PG (ref 25.2–33.5)
MCHC RBC AUTO-ENTMCNC: 30.4 G/DL (ref 28.4–34.8)
MCV RBC AUTO: 91.5 FL (ref 82.6–102.9)
MONOCYTES # BLD: 10 % (ref 3–12)
NRBC AUTOMATED: 0 PER 100 WBC
PDW BLD-RTO: 14.1 % (ref 11.8–14.4)
PHOSPHORUS: 2.6 MG/DL (ref 2.6–4.5)
PLATELET # BLD: ABNORMAL K/UL (ref 138–453)
PLATELET ESTIMATE: ABNORMAL
PLATELET, FLUORESCENCE: NORMAL K/UL (ref 138–453)
PMV BLD AUTO: ABNORMAL FL (ref 8.1–13.5)
POTASSIUM SERPL-SCNC: 3.3 MMOL/L (ref 3.7–5.3)
POTASSIUM SERPL-SCNC: 3.6 MMOL/L (ref 3.7–5.3)
RBC # BLD: 4.46 M/UL (ref 3.95–5.11)
RBC # BLD: ABNORMAL 10*6/UL
SEG NEUTROPHILS: 35 % (ref 36–65)
SEGMENTED NEUTROPHILS ABSOLUTE COUNT: 1.66 K/UL (ref 1.5–8.1)
SODIUM BLD-SCNC: 136 MMOL/L (ref 135–144)
SODIUM BLD-SCNC: 142 MMOL/L (ref 135–144)
TROPONIN INTERP: NORMAL
TROPONIN T: NORMAL NG/ML
TROPONIN, HIGH SENSITIVITY: <6 NG/L (ref 0–14)
WBC # BLD: 4.8 K/UL (ref 3.5–11.3)
WBC # BLD: ABNORMAL 10*3/UL

## 2020-06-26 PROCEDURE — 6360000004 HC RX CONTRAST MEDICATION: Performed by: STUDENT IN AN ORGANIZED HEALTH CARE EDUCATION/TRAINING PROGRAM

## 2020-06-26 PROCEDURE — 6360000002 HC RX W HCPCS: Performed by: STUDENT IN AN ORGANIZED HEALTH CARE EDUCATION/TRAINING PROGRAM

## 2020-06-26 PROCEDURE — 2580000003 HC RX 258: Performed by: STUDENT IN AN ORGANIZED HEALTH CARE EDUCATION/TRAINING PROGRAM

## 2020-06-26 PROCEDURE — 74019 RADEX ABDOMEN 2 VIEWS: CPT

## 2020-06-26 PROCEDURE — 36415 COLL VENOUS BLD VENIPUNCTURE: CPT

## 2020-06-26 PROCEDURE — 84100 ASSAY OF PHOSPHORUS: CPT

## 2020-06-26 PROCEDURE — 93005 ELECTROCARDIOGRAM TRACING: CPT | Performed by: SURGERY

## 2020-06-26 PROCEDURE — 2500000003 HC RX 250 WO HCPCS: Performed by: STUDENT IN AN ORGANIZED HEALTH CARE EDUCATION/TRAINING PROGRAM

## 2020-06-26 PROCEDURE — 6370000000 HC RX 637 (ALT 250 FOR IP): Performed by: STUDENT IN AN ORGANIZED HEALTH CARE EDUCATION/TRAINING PROGRAM

## 2020-06-26 PROCEDURE — 85025 COMPLETE CBC W/AUTO DIFF WBC: CPT

## 2020-06-26 PROCEDURE — 1200000000 HC SEMI PRIVATE

## 2020-06-26 PROCEDURE — 74250 X-RAY XM SM INT 1CNTRST STD: CPT

## 2020-06-26 PROCEDURE — 80048 BASIC METABOLIC PNL TOTAL CA: CPT

## 2020-06-26 PROCEDURE — 84484 ASSAY OF TROPONIN QUANT: CPT

## 2020-06-26 PROCEDURE — 83735 ASSAY OF MAGNESIUM: CPT

## 2020-06-26 PROCEDURE — 85055 RETICULATED PLATELET ASSAY: CPT

## 2020-06-26 RX ORDER — DIPHENHYDRAMINE HYDROCHLORIDE 50 MG/ML
12.5 INJECTION INTRAMUSCULAR; INTRAVENOUS ONCE
Status: COMPLETED | OUTPATIENT
Start: 2020-06-26 | End: 2020-06-26

## 2020-06-26 RX ORDER — UREA 10 %
5 LOTION (ML) TOPICAL NIGHTLY PRN
Status: COMPLETED | OUTPATIENT
Start: 2020-06-26 | End: 2020-06-26

## 2020-06-26 RX ADMIN — SODIUM CHLORIDE, POTASSIUM CHLORIDE, SODIUM LACTATE AND CALCIUM CHLORIDE: 600; 310; 30; 20 INJECTION, SOLUTION INTRAVENOUS at 22:02

## 2020-06-26 RX ADMIN — ONDANSETRON 4 MG: 2 INJECTION INTRAMUSCULAR; INTRAVENOUS at 06:45

## 2020-06-26 RX ADMIN — IOHEXOL 300 ML: 240 INJECTION, SOLUTION INTRATHECAL; INTRAVASCULAR; INTRAVENOUS; ORAL at 08:57

## 2020-06-26 RX ADMIN — ONDANSETRON 4 MG: 2 INJECTION INTRAMUSCULAR; INTRAVENOUS at 14:09

## 2020-06-26 RX ADMIN — ACETAMINOPHEN 1000 MG: 10 INJECTION, SOLUTION INTRAVENOUS at 04:12

## 2020-06-26 RX ADMIN — MORPHINE SULFATE 4 MG: 4 INJECTION INTRAVENOUS at 23:03

## 2020-06-26 RX ADMIN — ACETAMINOPHEN 1000 MG: 10 INJECTION, SOLUTION INTRAVENOUS at 11:17

## 2020-06-26 RX ADMIN — FAMOTIDINE 20 MG: 10 INJECTION, SOLUTION INTRAVENOUS at 10:31

## 2020-06-26 RX ADMIN — MORPHINE SULFATE 4 MG: 4 INJECTION INTRAVENOUS at 16:26

## 2020-06-26 RX ADMIN — BENZOCAINE AND MENTHOL 1 LOZENGE: 15; 3.6 LOZENGE ORAL at 20:21

## 2020-06-26 RX ADMIN — DIPHENHYDRAMINE HYDROCHLORIDE 12.5 MG: 50 INJECTION, SOLUTION INTRAMUSCULAR; INTRAVENOUS at 16:26

## 2020-06-26 RX ADMIN — MORPHINE SULFATE 4 MG: 4 INJECTION INTRAVENOUS at 13:29

## 2020-06-26 RX ADMIN — MORPHINE SULFATE 4 MG: 4 INJECTION INTRAVENOUS at 06:46

## 2020-06-26 RX ADMIN — DIPHENHYDRAMINE HYDROCHLORIDE 12.5 MG: 50 INJECTION, SOLUTION INTRAMUSCULAR; INTRAVENOUS at 01:26

## 2020-06-26 RX ADMIN — MORPHINE SULFATE 4 MG: 4 INJECTION INTRAVENOUS at 03:35

## 2020-06-26 RX ADMIN — Medication 5 MG: at 23:48

## 2020-06-26 RX ADMIN — MORPHINE SULFATE 4 MG: 4 INJECTION INTRAVENOUS at 00:33

## 2020-06-26 RX ADMIN — Medication 10 MG: at 07:38

## 2020-06-26 RX ADMIN — HYDRALAZINE HYDROCHLORIDE 10 MG: 20 INJECTION INTRAMUSCULAR; INTRAVENOUS at 20:30

## 2020-06-26 RX ADMIN — DIPHENHYDRAMINE HYDROCHLORIDE 12.5 MG: 50 INJECTION, SOLUTION INTRAMUSCULAR; INTRAVENOUS at 10:32

## 2020-06-26 RX ADMIN — Medication 10 MG: at 18:18

## 2020-06-26 RX ADMIN — Medication 10 MG: at 23:48

## 2020-06-26 RX ADMIN — MORPHINE SULFATE 4 MG: 4 INJECTION INTRAVENOUS at 10:30

## 2020-06-26 RX ADMIN — BENZOCAINE AND MENTHOL 1 LOZENGE: 15; 3.6 LOZENGE ORAL at 23:03

## 2020-06-26 RX ADMIN — ACETAMINOPHEN 1000 MG: 10 INJECTION, SOLUTION INTRAVENOUS at 18:42

## 2020-06-26 RX ADMIN — Medication 10 ML: at 20:29

## 2020-06-26 RX ADMIN — MORPHINE SULFATE 4 MG: 4 INJECTION INTRAVENOUS at 19:24

## 2020-06-26 RX ADMIN — ENOXAPARIN SODIUM 40 MG: 40 INJECTION SUBCUTANEOUS at 10:34

## 2020-06-26 RX ADMIN — FAMOTIDINE 20 MG: 10 INJECTION, SOLUTION INTRAVENOUS at 20:29

## 2020-06-26 ASSESSMENT — PAIN DESCRIPTION - PROGRESSION

## 2020-06-26 ASSESSMENT — PAIN SCALES - GENERAL
PAINLEVEL_OUTOF10: 9
PAINLEVEL_OUTOF10: 6
PAINLEVEL_OUTOF10: 5
PAINLEVEL_OUTOF10: 8
PAINLEVEL_OUTOF10: 7
PAINLEVEL_OUTOF10: 9
PAINLEVEL_OUTOF10: 8
PAINLEVEL_OUTOF10: 5
PAINLEVEL_OUTOF10: 7
PAINLEVEL_OUTOF10: 9
PAINLEVEL_OUTOF10: 7
PAINLEVEL_OUTOF10: 4

## 2020-06-26 ASSESSMENT — PAIN DESCRIPTION - DESCRIPTORS: DESCRIPTORS: SHARP

## 2020-06-26 ASSESSMENT — PAIN DESCRIPTION - PAIN TYPE: TYPE: ACUTE PAIN

## 2020-06-26 ASSESSMENT — PAIN DESCRIPTION - LOCATION: LOCATION: ABDOMEN;CHEST

## 2020-06-26 ASSESSMENT — PAIN DESCRIPTION - ORIENTATION: ORIENTATION: RIGHT;LEFT;MID

## 2020-06-26 NOTE — PROGRESS NOTES
General Surgery:  Daily Progress Note              PATIENT NAME: Jerel Felipe     TODAY'S DATE: 6/25/2020, 9:09 PM  CC:  Abdominal pain    SUBJECTIVE:     Pt seen and examined at bedside. No events overnight. Afebrile. Pt states overall pain is improving. Some nausea when abdomen is palpated. NGT in place with 650cc output/24h.   - flatus, - BM. OBJECTIVE:   VITALS:  BP (!) 152/96   Pulse 100   Temp 98 °F (36.7 °C) (Oral)   Resp 18   Ht 5' 1\" (1.549 m)   Wt 211 lb (95.7 kg)   SpO2 99%   BMI 39.87 kg/m²      INTAKE/OUTPUT:      Intake/Output Summary (Last 24 hours) at 6/25/2020 2109  Last data filed at 6/25/2020 2020  Gross per 24 hour   Intake 1904 ml   Output 450 ml   Net 1454 ml       PHYSICAL EXAM:  General Appearance:  awake, alert, oriented, in no acute distress  HEENT:  Normocephalic, atraumatic, mucus membranes moist   Skin:  Skin color, texture, turgor normal. No rashes or lesions. Lungs: Effort normal, no respiratory distress, no accessory muscle use   Heart: Regular rate and rhythm  Abdomen:  Softly distended  Extremities: Extremities warm to touch, pink, with no edema.       Data:  CBC:   Lab Results   Component Value Date    WBC 11.7 06/25/2020    RBC 5.29 06/25/2020    RBC 4.13 10/26/2011    HGB 14.7 06/25/2020    HCT 46.0 06/25/2020    MCV 87.0 06/25/2020    MCH 27.8 06/25/2020    MCHC 32.0 06/25/2020    RDW 14.3 06/25/2020     06/25/2020     10/26/2011    MPV 10.2 06/25/2020     BMP:    Lab Results   Component Value Date     06/25/2020    K 4.2 06/25/2020     06/25/2020    CO2 23 06/25/2020    BUN 8 06/25/2020    LABALBU 4.9 06/25/2020    LABALBU 4.7 10/26/2011    CREATININE 0.73 06/25/2020    CALCIUM 10.4 06/25/2020    GFRAA >60 06/25/2020    LABGLOM >60 06/25/2020    GLUCOSE 142 06/25/2020          ASSESSMENT:  Active Hospital Problems    Diagnosis Date Noted    Small bowel obstruction [facility]) [Y76.777] 06/25/2020       54 y.o. female s/p Leo Hernandez

## 2020-06-26 NOTE — PLAN OF CARE
Problem: Pain:  Goal: Pain level will decrease  Description: Pain level will decrease  Outcome: Ongoing  Goal: Control of acute pain  Description: Control of acute pain  Outcome: Ongoing  Goal: Control of chronic pain  Description: Control of chronic pain  Outcome: Ongoing  Goal: Patient's pain/discomfort is manageable  Description: Patient's pain/discomfort is manageable  Outcome: Ongoing     Problem: Falls - Risk of:  Goal: Will remain free from falls  Description: Will remain free from falls  Outcome: Ongoing  Goal: Absence of physical injury  Description: Absence of physical injury  Outcome: Ongoing     Problem: Infection:  Goal: Will remain free from infection  Description: Will remain free from infection  Outcome: Ongoing     Problem: Safety:  Goal: Free from accidental physical injury  Description: Free from accidental physical injury  Outcome: Ongoing  Goal: Free from intentional harm  Description: Free from intentional harm  Outcome: Ongoing     Problem: Daily Care:  Goal: Daily care needs are met  Description: Daily care needs are met  Outcome: Ongoing     Problem: Discharge Planning:  Goal: Patients continuum of care needs are met  Description: Patients continuum of care needs are met  Outcome: Ongoing

## 2020-06-26 NOTE — PROGRESS NOTES
Nutrition Assessment    Type and Reason for Visit: Positive Nutrition Screen, Initial    Nutrition Recommendations: Initiate diet within 48 hours. Please encourage intake. Nutrition Assessment: Chart reviewed due to bowel obstruction. Pt currently off the floor for SBFT. Noted 650ml output from NGT. Malnutrition Assessment:  · Malnutrition Status: Insufficient data  · Context: Acute illness or injury  · Findings of the 6 clinical characteristics of malnutrition (Minimum of 2 out of 6 clinical characteristics is required to make the diagnosis of moderate or severe Protein Calorie Malnutrition based on AND/ASPEN Guidelines):  1. Energy Intake-Unable to assess, Unable to assess    2. Weight Loss-Unable to assess,    3. Fat Loss-Unable to assess,    4. Muscle Loss-Unable to assess,    5. Fluid Accumulation-No significant fluid accumulation,    6.  Strength-Not measured    Nutrition Risk Level: Moderate    Nutrient Needs:  · Estimated Daily Total Kcal: 1525-6222  · Estimated Daily Protein (g): 110-130    Nutrition Diagnosis:   · Problem: Inadequate oral intake  · Etiology: related to (current medical condition)     Signs and symptoms:  as evidenced by NPO status due to medical condition    Objective Information:  · Nutrition-Focused Physical Findings: labs and meds reviewed. · Wound Type: None  · Current Nutrition Therapies:  · Oral Diet Orders: NPO   · Oral Diet intake: NPO  · Anthropometric Measures:  · Ht: 5' 1\" (154.9 cm)   · Current Body Wt: 211 lb (95.7 kg)(stated)  · Ideal Body Wt: 105 lb (47.6 kg), % Ideal Body 200%  · BMI Classification: BMI > or equal to 40.0 Obese Class III    Nutrition Interventions:   Continue NPO  Continued Inpatient Monitoring    Nutrition Evaluation:   · Evaluation: Goals set   · Goals: Initiation of diet within 48 hours.     · Monitoring: Nutrition Progression, TF Tolerance, TF Intake, I&O, Weight, Pertinent Labs, Monitor Bowel Function      Electronically signed by Naz Gonzalez RD, LD on 6/26/20 at 1:12 PM EDT    Contact Number: [phone]

## 2020-06-27 LAB
ANION GAP SERPL CALCULATED.3IONS-SCNC: 15 MMOL/L (ref 9–17)
BUN BLDV-MCNC: 4 MG/DL (ref 6–20)
BUN/CREAT BLD: ABNORMAL (ref 9–20)
CALCIUM SERPL-MCNC: 9 MG/DL (ref 8.6–10.4)
CHLORIDE BLD-SCNC: 99 MMOL/L (ref 98–107)
CO2: 23 MMOL/L (ref 20–31)
CREAT SERPL-MCNC: 0.51 MG/DL (ref 0.5–0.9)
GFR AFRICAN AMERICAN: >60 ML/MIN
GFR NON-AFRICAN AMERICAN: >60 ML/MIN
GFR SERPL CREATININE-BSD FRML MDRD: ABNORMAL ML/MIN/{1.73_M2}
GFR SERPL CREATININE-BSD FRML MDRD: ABNORMAL ML/MIN/{1.73_M2}
GLUCOSE BLD-MCNC: 100 MG/DL (ref 70–99)
POTASSIUM SERPL-SCNC: 3.2 MMOL/L (ref 3.7–5.3)
SODIUM BLD-SCNC: 137 MMOL/L (ref 135–144)
TROPONIN INTERP: NORMAL
TROPONIN INTERP: NORMAL
TROPONIN T: NORMAL NG/ML
TROPONIN T: NORMAL NG/ML
TROPONIN, HIGH SENSITIVITY: <6 NG/L (ref 0–14)
TROPONIN, HIGH SENSITIVITY: <6 NG/L (ref 0–14)

## 2020-06-27 PROCEDURE — 6360000002 HC RX W HCPCS: Performed by: STUDENT IN AN ORGANIZED HEALTH CARE EDUCATION/TRAINING PROGRAM

## 2020-06-27 PROCEDURE — 84484 ASSAY OF TROPONIN QUANT: CPT

## 2020-06-27 PROCEDURE — 80048 BASIC METABOLIC PNL TOTAL CA: CPT

## 2020-06-27 PROCEDURE — 2580000003 HC RX 258: Performed by: STUDENT IN AN ORGANIZED HEALTH CARE EDUCATION/TRAINING PROGRAM

## 2020-06-27 PROCEDURE — 2500000003 HC RX 250 WO HCPCS: Performed by: STUDENT IN AN ORGANIZED HEALTH CARE EDUCATION/TRAINING PROGRAM

## 2020-06-27 PROCEDURE — 6370000000 HC RX 637 (ALT 250 FOR IP): Performed by: STUDENT IN AN ORGANIZED HEALTH CARE EDUCATION/TRAINING PROGRAM

## 2020-06-27 PROCEDURE — 1200000000 HC SEMI PRIVATE

## 2020-06-27 PROCEDURE — 36415 COLL VENOUS BLD VENIPUNCTURE: CPT

## 2020-06-27 RX ORDER — MORPHINE SULFATE 2 MG/ML
2 INJECTION, SOLUTION INTRAMUSCULAR; INTRAVENOUS
Status: DISCONTINUED | OUTPATIENT
Start: 2020-06-27 | End: 2020-06-28

## 2020-06-27 RX ORDER — HYDROCHLOROTHIAZIDE 25 MG/1
25 TABLET ORAL DAILY
Status: DISCONTINUED | OUTPATIENT
Start: 2020-06-27 | End: 2020-07-01 | Stop reason: HOSPADM

## 2020-06-27 RX ORDER — POTASSIUM CHLORIDE 20 MEQ/1
40 TABLET, EXTENDED RELEASE ORAL ONCE
Status: COMPLETED | OUTPATIENT
Start: 2020-06-27 | End: 2020-06-27

## 2020-06-27 RX ORDER — PANTOPRAZOLE SODIUM 40 MG/1
40 TABLET, DELAYED RELEASE ORAL EVERY MORNING
Status: DISCONTINUED | OUTPATIENT
Start: 2020-06-27 | End: 2020-07-01 | Stop reason: HOSPADM

## 2020-06-27 RX ORDER — LABETALOL 20 MG/4 ML (5 MG/ML) INTRAVENOUS SYRINGE
10 EVERY 6 HOURS PRN
Status: DISCONTINUED | OUTPATIENT
Start: 2020-06-27 | End: 2020-07-01 | Stop reason: HOSPADM

## 2020-06-27 RX ORDER — ACETAMINOPHEN 500 MG
1000 TABLET ORAL EVERY 8 HOURS SCHEDULED
Status: DISCONTINUED | OUTPATIENT
Start: 2020-06-27 | End: 2020-07-01

## 2020-06-27 RX ORDER — AMLODIPINE BESYLATE 2.5 MG/1
2.5 TABLET ORAL DAILY
Status: DISCONTINUED | OUTPATIENT
Start: 2020-06-27 | End: 2020-07-01 | Stop reason: HOSPADM

## 2020-06-27 RX ORDER — UREA 10 %
5 LOTION (ML) TOPICAL NIGHTLY PRN
Status: COMPLETED | OUTPATIENT
Start: 2020-06-27 | End: 2020-06-28

## 2020-06-27 RX ADMIN — MORPHINE SULFATE 4 MG: 4 INJECTION INTRAVENOUS at 10:07

## 2020-06-27 RX ADMIN — MORPHINE SULFATE 4 MG: 4 INJECTION INTRAVENOUS at 03:52

## 2020-06-27 RX ADMIN — POTASSIUM CHLORIDE 40 MEQ: 1500 TABLET, EXTENDED RELEASE ORAL at 13:13

## 2020-06-27 RX ADMIN — ACETAMINOPHEN 1000 MG: 10 INJECTION, SOLUTION INTRAVENOUS at 03:46

## 2020-06-27 RX ADMIN — MORPHINE SULFATE 2 MG: 2 INJECTION, SOLUTION INTRAMUSCULAR; INTRAVENOUS at 19:08

## 2020-06-27 RX ADMIN — HYDROCHLOROTHIAZIDE 25 MG: 25 TABLET ORAL at 14:11

## 2020-06-27 RX ADMIN — Medication 10 MG: at 07:09

## 2020-06-27 RX ADMIN — Medication 10 ML: at 10:19

## 2020-06-27 RX ADMIN — MORPHINE SULFATE 2 MG: 2 INJECTION, SOLUTION INTRAMUSCULAR; INTRAVENOUS at 22:43

## 2020-06-27 RX ADMIN — LABETALOL 20 MG/4 ML (5 MG/ML) INTRAVENOUS SYRINGE 10 MG: at 20:44

## 2020-06-27 RX ADMIN — SODIUM CHLORIDE, POTASSIUM CHLORIDE, SODIUM LACTATE AND CALCIUM CHLORIDE: 600; 310; 30; 20 INJECTION, SOLUTION INTRAVENOUS at 14:49

## 2020-06-27 RX ADMIN — PANTOPRAZOLE SODIUM 40 MG: 40 TABLET, DELAYED RELEASE ORAL at 14:11

## 2020-06-27 RX ADMIN — AMLODIPINE BESYLATE 2.5 MG: 2.5 TABLET ORAL at 14:11

## 2020-06-27 RX ADMIN — POTASSIUM CHLORIDE 40 MEQ: 1500 TABLET, EXTENDED RELEASE ORAL at 10:07

## 2020-06-27 RX ADMIN — ACETAMINOPHEN 1000 MG: 500 TABLET ORAL at 13:13

## 2020-06-27 RX ADMIN — ENOXAPARIN SODIUM 40 MG: 40 INJECTION SUBCUTANEOUS at 10:07

## 2020-06-27 RX ADMIN — MORPHINE SULFATE 2 MG: 2 INJECTION, SOLUTION INTRAMUSCULAR; INTRAVENOUS at 15:47

## 2020-06-27 RX ADMIN — BENZOCAINE AND MENTHOL 1 LOZENGE: 15; 3.6 LOZENGE ORAL at 03:51

## 2020-06-27 RX ADMIN — FAMOTIDINE 20 MG: 10 INJECTION, SOLUTION INTRAVENOUS at 10:07

## 2020-06-27 RX ADMIN — ACETAMINOPHEN 1000 MG: 500 TABLET ORAL at 22:43

## 2020-06-27 ASSESSMENT — PAIN DESCRIPTION - PROGRESSION
CLINICAL_PROGRESSION: NOT CHANGED

## 2020-06-27 ASSESSMENT — PAIN SCALES - GENERAL
PAINLEVEL_OUTOF10: 7
PAINLEVEL_OUTOF10: 7
PAINLEVEL_OUTOF10: 8
PAINLEVEL_OUTOF10: 3
PAINLEVEL_OUTOF10: 6
PAINLEVEL_OUTOF10: 8
PAINLEVEL_OUTOF10: 7
PAINLEVEL_OUTOF10: 6

## 2020-06-27 ASSESSMENT — PAIN DESCRIPTION - LOCATION: LOCATION: ABDOMEN

## 2020-06-27 ASSESSMENT — PAIN DESCRIPTION - PAIN TYPE: TYPE: ACUTE PAIN

## 2020-06-27 NOTE — CONSULTS
[facility]   Consult Note         Today's Date: 2020  Patient Name: Jose Sotelo  Date of admission: 2020  6:45 AM  Patient's age: 54 y.o., 1970  Admission Dx: Small bowel obstruction ([facility] [address] [W40.724]    Reason for Consult:  Cardiac evaluation    Requesting Physician: Delia Bashir DO    REASON FOR CONSULT:  Abn ECG    History Obtained From:  Patient, chart, staff, records    HISTORY OF PRESENT ILLNESS:      The patient is a 54 y.o. female who is admitted to the [facility] procedure. They noted an irregular heart rate and did ECG. The ECG machine said \"Mobitz\" and cardiology was called. Patient doing well post surgery. Past Medical History:   has a past medical history of Bipolar disorder ([facility] [address] Depression, Gastritis, Hypertension, and IBS (irritable bowel syndrome). Past Surgical History:   has a past surgical history that includes Tonsillectomy; Appendectomy; Tubal ligation;  section; Hysterectomy; and Abdomen surgery. Home Medications:    Prior to Admission medications    Medication Sig Start Date End Date Taking?  Authorizing Provider   CLEARLAX powder MIX 17 GRAMS IN LIQUID AND DRINK BY MOUTH DAILY 3/27/20  Yes Radha Hart MD   folic acid (FOLVITE) 1 MG tablet TAKE (1) TABLET BY MOUTH DAILY 3/27/20  Yes Radha Hart MD   albuterol sulfate  (90 Base) MCG/ACT inhaler INHALE TWO (2) PUFFS BY MOUTH FOUR TIMES A DAY AS NEEDED FOR WHEEZING 3/27/20  Yes Radha Hart MD   cloNIDine (CATAPRES) 0.3 MG tablet TAKE ONE (1) TABLET BY MOUTH TWICE DAILY 3/27/20  Yes Radha Hart MD   loratadine (CLARITIN) 10 MG tablet TAKE 1 TABLET BY MOUTH NIGHTLY AS NEEDED FOR SEASONAL ALLERGIES 20  Yes Radha Hart MD   hydrochlorothiazide (HYDRODIURIL) 25 MG tablet TAKE (1) TABLET BY MOUTH DAILY 20  Yes Radha Hart MD   losartan (COZAAR) 25 MG tablet TAKE 1 TABLET BY MOUTH DAILY 19  Yes Radha Hart MD   amLODIPine (NORVASC) 2.5 MG tablet TAKE 1 TABLET BY MOUTH DAILY 11/21/19  Yes Chelsie Rivera MD   meclizine (ANTIVERT) 12.5 MG tablet TAKE 1 TABLET BY MOUTH THREE TIMES DAILY AS NEEDED FOR DIZZINESS OR NAUSEA 11/21/19  Yes Chelsie Rivera MD   colestipol (COLESTID) 1 g tablet TAKE 1 TABLET BY MOUTH EVERY MORNING 11/18/19  Yes Chelsie Rivera MD   ondansetron (ZOFRAN-ODT) 4 MG disintegrating tablet Take 1 tablet by mouth 3 times daily as needed for Nausea or Vomiting 10/24/19  Yes Chelsie Rivera MD   omeprazole (PRILOSEC) 20 MG delayed release capsule Take 1 capsule by mouth daily (with breakfast) 10/21/19  Yes Chelsie Rivera MD   Cholecalciferol (VITAMIN D3) 2000 units CAPS Take 1 capsule by mouth daily 10/21/19  Yes Chelsie Rivera MD   estradiol (ESTRACE) 1 MG tablet Take 1 mg by mouth daily 7/9/19  Yes Historical Provider, MD   sertraline (ZOLOFT) 100 MG tablet Take 100 mg by mouth   Yes Historical Provider, MD   prazosin (MINIPRESS) 2 MG capsule  7/6/18  Yes Historical Provider, MD   butenafine (LOTRIMIN ULTRA) 1 % CREA Apply 1 Tube topically as needed (apply to affected area as needed) 7/27/18  Yes Willie Cyr,    traZODone (DESYREL) 100 MG tablet Take 200 mg by mouth nightly as needed for Sleep   Yes Historical Provider, MD   Sodium Phosphates (FLEET) 7-19 GM/118ML Place 1 enema rectally daily as needed (constipation). 3/16/15  Yes Angelica Bernstein, DO   Multiple Vitamins-Minerals (HM COMPLETE 50+) TABS One tablet QD 10/21/19   Chelsie Rivera MD       potassium chloride, 40 mEq, Oral, Once    milk and molasses, 240 mL, Rectal, Once    sodium chloride flush, 10 mL, Intravenous, 2 times per day    enoxaparin, 40 mg, Subcutaneous, Daily    famotidine (PEPCID) injection, 20 mg, Intravenous, BID    labetalol, 10 mg, Intravenous, Q6H    sodium chloride, 1,000 mL, Intravenous, Once    acetaminophen, 1,000 mg, Intravenous, Q8H      Allergies:  Motrin [ibuprofen]    Social History:   reports that she has never smoked.  She has never used normal. regular S1 and S2.  · Jugular venous pulsation Normal  · The carotid upstroke is normal in amplitude and contour without delay or bruit  · Peripheral pulses are symmetrical and full   Abdomen:   · No masses or tenderness  · Bowel sounds present  Extremities:  ·  No Cyanosis or Clubbing  ·  Lower extremity edema: No  ·  Skin: Warm and dry  Neurological:  · Alert and oriented. · Moves all extremities well  · No abnormalities of mood, affect, memory, mentation, or behavior are noted        EKG:    Date: 06/27/20  Reading: No acute ischemia      LAST ECHO:  Date:  Findings Summary:      LAST Stress Test:   Date of last ST:  Major Findings:    LAST Cardiac Angiography:.  Date:  Findings:      Labs:     CBC:   Recent Labs     06/25/20  0706 06/26/20  0430   WBC 11.7* 4.8   HGB 14.7 12.4   HCT 46.0 40.8    See Reflexed IPF Result     BMP:   Recent Labs     06/26/20  1936 06/27/20  0612    137   K 3.3* 3.2*   CO2 21 23   BUN 6 4*   CREATININE 0.51 0.51   LABGLOM >60 >60   GLUCOSE 96 100*     BNP: No results for input(s): BNP in the last 72 hours. PT/INR: No results for input(s): PROTIME, INR in the last 72 hours. APTT:No results for input(s): APTT in the last 72 hours. CARDIAC ENZYMES:No results for input(s): CKTOTAL, CKMB, CKMBINDEX, TROPONINI in the last 72 hours.   FASTING LIPID PANEL:  Lab Results   Component Value Date    HDL 70 09/16/2019    TRIG 154 09/16/2019     LIVER PROFILE:  Recent Labs     06/25/20  0706   AST 17   ALT 15   LABALBU 4.9     Troponins: Invalid input(s): TROPONIN     Other Current Problems  Patient Active Problem List   Diagnosis    Major depressive disorder, recurrent episode, severe (HCC)    Bilateral lower abdominal pain    Irritable bowel syndrome with constipation and diarrhea    Uterine leiomyoma    Mirena IUD (1/24/17)    Abnormal uterine bleeding (AUB)    Small bowel obstruction (HCC)           IMPRESSION & Recommendations:      No mobitz on ECG, machine algorithm failed. Patient had a PVC and a late beat, which the machine algorithm identified incorrectly. Hx of sinus arrhythmia  Can follow up in clinic  Ok to DC      Discussed with patient, family, and Nurse. Electronically signed by Yara Florian DO on 6/27/2020 at 12:48 PM    Yara Florian, [id number] [facility]  [facility]  [phone]

## 2020-06-27 NOTE — PLAN OF CARE
Problem: Pain:  Goal: Pain level will decrease  Description: Pain level will decrease  6/27/2020 0438 by Alison Haider RN  Outcome: Ongoing  6/26/2020 1827 by Basil Ronquillo RN  Outcome: Ongoing  Goal: Control of acute pain  Description: Control of acute pain  6/27/2020 0438 by Alison Haider RN  Outcome: Ongoing  6/26/2020 1827 by Basil Ronquillo RN  Outcome: Ongoing  Goal: Control of chronic pain  Description: Control of chronic pain  6/27/2020 0438 by Alison Haider RN  Outcome: Ongoing  6/26/2020 1827 by Basil Ronquillo RN  Outcome: Ongoing  Goal: Patient's pain/discomfort is manageable  Description: Patient's pain/discomfort is manageable  6/27/2020 0438 by Alison Haider RN  Outcome: Ongoing  6/26/2020 1827 by Basil Ronquillo RN  Outcome: Ongoing

## 2020-06-28 ENCOUNTER — APPOINTMENT (OUTPATIENT)
Dept: GENERAL RADIOLOGY | Age: 50
DRG: 247 | End: 2020-06-28
Payer: COMMERCIAL

## 2020-06-28 LAB
EKG ATRIAL RATE: 70 BPM
EKG P AXIS: 31 DEGREES
EKG Q-T INTERVAL: 428 MS
EKG QRS DURATION: 98 MS
EKG QTC CALCULATION (BAZETT): 462 MS
EKG R AXIS: 18 DEGREES
EKG T AXIS: 1 DEGREES
EKG VENTRICULAR RATE: 70 BPM

## 2020-06-28 PROCEDURE — 93010 ELECTROCARDIOGRAM REPORT: CPT | Performed by: INTERNAL MEDICINE

## 2020-06-28 PROCEDURE — 2580000003 HC RX 258: Performed by: STUDENT IN AN ORGANIZED HEALTH CARE EDUCATION/TRAINING PROGRAM

## 2020-06-28 PROCEDURE — 6360000002 HC RX W HCPCS: Performed by: STUDENT IN AN ORGANIZED HEALTH CARE EDUCATION/TRAINING PROGRAM

## 2020-06-28 PROCEDURE — 74018 RADEX ABDOMEN 1 VIEW: CPT

## 2020-06-28 PROCEDURE — 1200000000 HC SEMI PRIVATE

## 2020-06-28 PROCEDURE — 6370000000 HC RX 637 (ALT 250 FOR IP): Performed by: STUDENT IN AN ORGANIZED HEALTH CARE EDUCATION/TRAINING PROGRAM

## 2020-06-28 RX ORDER — UREA 10 %
5 LOTION (ML) TOPICAL NIGHTLY PRN
Status: COMPLETED | OUTPATIENT
Start: 2020-06-28 | End: 2020-06-28

## 2020-06-28 RX ORDER — MAGNESIUM CARB/ALUMINUM HYDROX 105-160MG
30 TABLET,CHEWABLE ORAL ONCE
Status: COMPLETED | OUTPATIENT
Start: 2020-06-28 | End: 2020-06-28

## 2020-06-28 RX ORDER — OXYCODONE HYDROCHLORIDE 5 MG/1
5 TABLET ORAL EVERY 4 HOURS PRN
Status: DISCONTINUED | OUTPATIENT
Start: 2020-06-28 | End: 2020-07-01

## 2020-06-28 RX ADMIN — AMLODIPINE BESYLATE 2.5 MG: 2.5 TABLET ORAL at 08:29

## 2020-06-28 RX ADMIN — MORPHINE SULFATE 2 MG: 2 INJECTION, SOLUTION INTRAMUSCULAR; INTRAVENOUS at 04:28

## 2020-06-28 RX ADMIN — Medication 5 MG: at 00:41

## 2020-06-28 RX ADMIN — ACETAMINOPHEN 1000 MG: 500 TABLET ORAL at 13:21

## 2020-06-28 RX ADMIN — MINERAL OIL 30 ML: 1000 SOLUTION ORAL at 12:35

## 2020-06-28 RX ADMIN — MORPHINE SULFATE 2 MG: 2 INJECTION, SOLUTION INTRAMUSCULAR; INTRAVENOUS at 16:34

## 2020-06-28 RX ADMIN — ACETAMINOPHEN 1000 MG: 500 TABLET ORAL at 21:01

## 2020-06-28 RX ADMIN — OXYCODONE HYDROCHLORIDE 5 MG: 5 TABLET ORAL at 21:01

## 2020-06-28 RX ADMIN — ACETAMINOPHEN 1000 MG: 500 TABLET ORAL at 04:28

## 2020-06-28 RX ADMIN — MORPHINE SULFATE 2 MG: 2 INJECTION, SOLUTION INTRAMUSCULAR; INTRAVENOUS at 07:37

## 2020-06-28 RX ADMIN — Medication 10 ML: at 20:58

## 2020-06-28 RX ADMIN — MORPHINE SULFATE 2 MG: 2 INJECTION, SOLUTION INTRAMUSCULAR; INTRAVENOUS at 11:33

## 2020-06-28 RX ADMIN — PANTOPRAZOLE SODIUM 40 MG: 40 TABLET, DELAYED RELEASE ORAL at 08:29

## 2020-06-28 RX ADMIN — ENOXAPARIN SODIUM 40 MG: 40 INJECTION SUBCUTANEOUS at 08:29

## 2020-06-28 RX ADMIN — Medication 5 MG: at 22:53

## 2020-06-28 RX ADMIN — HYDROCHLOROTHIAZIDE 25 MG: 25 TABLET ORAL at 08:29

## 2020-06-28 ASSESSMENT — PAIN DESCRIPTION - LOCATION
LOCATION: ABDOMEN
LOCATION: ABDOMEN

## 2020-06-28 ASSESSMENT — PAIN SCALES - GENERAL
PAINLEVEL_OUTOF10: 8
PAINLEVEL_OUTOF10: 8
PAINLEVEL_OUTOF10: 2
PAINLEVEL_OUTOF10: 7
PAINLEVEL_OUTOF10: 3
PAINLEVEL_OUTOF10: 2
PAINLEVEL_OUTOF10: 7
PAINLEVEL_OUTOF10: 9
PAINLEVEL_OUTOF10: 7

## 2020-06-28 ASSESSMENT — PAIN DESCRIPTION - ONSET
ONSET: ON-GOING
ONSET: ON-GOING

## 2020-06-28 ASSESSMENT — PAIN DESCRIPTION - FREQUENCY
FREQUENCY: CONTINUOUS
FREQUENCY: CONTINUOUS

## 2020-06-28 ASSESSMENT — PAIN DESCRIPTION - DESCRIPTORS
DESCRIPTORS: CRAMPING;SHARP;SHOOTING
DESCRIPTORS: SHOOTING;SHARP;CRAMPING

## 2020-06-28 ASSESSMENT — PAIN - FUNCTIONAL ASSESSMENT
PAIN_FUNCTIONAL_ASSESSMENT: PREVENTS OR INTERFERES SOME ACTIVE ACTIVITIES AND ADLS
PAIN_FUNCTIONAL_ASSESSMENT: PREVENTS OR INTERFERES SOME ACTIVE ACTIVITIES AND ADLS

## 2020-06-28 ASSESSMENT — PAIN DESCRIPTION - ORIENTATION
ORIENTATION: MID;UPPER
ORIENTATION: UPPER;MID

## 2020-06-28 ASSESSMENT — PAIN DESCRIPTION - PAIN TYPE
TYPE: ACUTE PAIN
TYPE: ACUTE PAIN

## 2020-06-28 ASSESSMENT — PAIN DESCRIPTION - PROGRESSION
CLINICAL_PROGRESSION: GRADUALLY IMPROVING
CLINICAL_PROGRESSION: GRADUALLY IMPROVING

## 2020-06-28 NOTE — PROGRESS NOTES
General Surgery:  Daily Progress Note              PATIENT NAME: Tiesha Hernandez     TODAY'S DATE: 6/28/2020, 7:39 AM  CC:  Abdominal pain    SUBJECTIVE:     Pt seen and examined at bedside. No events overnight. Afebrile. Her abdominal pain is somewhat increased from yesterday. No nausea, no emesis. +flatus, +stools but loose and small. Tolerating clears. OBJECTIVE:   VITALS:  BP (!) 166/113   Pulse 75   Temp 98.5 °F (36.9 °C) (Oral)   Resp 16   Ht 5' 1\" (1.549 m)   Wt 201 lb 12.8 oz (91.5 kg)   SpO2 100%   BMI 38.13 kg/m²      INTAKE/OUTPUT:      Intake/Output Summary (Last 24 hours) at 6/28/2020 0739  Last data filed at 6/28/2020 0432  Gross per 24 hour   Intake 1659 ml   Output 3550 ml   Net -1891 ml       PHYSICAL EXAM:  General Appearance:  awake, alert, oriented, in no acute distress  HEENT:  Normocephalic, atraumatic, mucus membranes moist   Skin:  Skin color, texture, turgor normal. No rashes or lesions. Lungs: Effort normal, no respiratory distress, no accessory muscle use   Heart: Regular rate and rhythm  Abdomen:  Soft, nondistended, TTP in the periumbilical area, nonperitoneal   Extremities: Extremities warm to touch, pink, with no edema.       Data:  CBC:   Lab Results   Component Value Date    WBC 4.8 06/26/2020    RBC 4.46 06/26/2020    RBC 4.13 10/26/2011    HGB 12.4 06/26/2020    HCT 40.8 06/26/2020    MCV 91.5 06/26/2020    MCH 27.8 06/26/2020    MCHC 30.4 06/26/2020    RDW 14.1 06/26/2020    PLT See Reflexed IPF Result 06/26/2020     10/26/2011    MPV NOT REPORTED 06/26/2020     BMP:    Lab Results   Component Value Date     06/27/2020    K 3.2 06/27/2020    CL 99 06/27/2020    CO2 23 06/27/2020    BUN 4 06/27/2020    LABALBU 4.9 06/25/2020    LABALBU 4.7 10/26/2011    CREATININE 0.51 06/27/2020    CALCIUM 9.0 06/27/2020    GFRAA >60 06/27/2020    LABGLOM >60 06/27/2020    GLUCOSE 100 06/27/2020          ASSESSMENT:  Active Hospital Problems    Diagnosis Date Noted    Small bowel obstruction [facility]) [K56.609] 06/25/2020       54 y.o. female s/p Luci procedure (reversded), diverting loop ostomy (reversed), appy    Presented with 3 day history of abd pain, nausea, emesis found to have SBO     Plan:  1. Diet: clear  2. Continue pain and nausea control  3. Monitor bowel fxn  4. Will get KUB this AM due to worsening abdominal pain   5.  Serial abdominal exams        Electronically signed by Allan Roblero DO

## 2020-06-28 NOTE — PLAN OF CARE
Problem: Pain:  Goal: Pain level will decrease  Description: Pain level will decrease  6/28/2020 1800 by Ming Manzano RN  Outcome: Ongoing  6/28/2020 1023 by Ming Manzano RN  Outcome: Ongoing  Goal: Control of acute pain  Description: Control of acute pain  6/28/2020 1800 by Ming Manzano RN  Outcome: Ongoing  6/28/2020 1023 by Ming Manzano RN  Outcome: Ongoing  Goal: Control of chronic pain  Description: Control of chronic pain  6/28/2020 1800 by Ming Manzano RN  Outcome: Ongoing  6/28/2020 1023 by Ming aMnzano RN  Outcome: Ongoing  Goal: Patient's pain/discomfort is manageable  Description: Patient's pain/discomfort is manageable  6/28/2020 1800 by Ming Manzano RN  Outcome: Ongoing  6/28/2020 1023 by Ming Manzano RN  Outcome: Ongoing     Problem: Falls - Risk of:  Goal: Will remain free from falls  Description: Will remain free from falls  6/28/2020 1800 by Ming Manzano RN  Outcome: Ongoing  6/28/2020 1023 by Ming Manzano RN  Outcome: Ongoing  Goal: Absence of physical injury  Description: Absence of physical injury  6/28/2020 1800 by Ming Manzano RN  Outcome: Ongoing  6/28/2020 1023 by Ming Manzano RN  Outcome: Ongoing     Problem: Infection:  Goal: Will remain free from infection  Description: Will remain free from infection  6/28/2020 1800 by Ming Manzano RN  Outcome: Ongoing  6/28/2020 1023 by Ming Manzano RN  Outcome: Ongoing     Problem: Safety:  Goal: Free from accidental physical injury  Description: Free from accidental physical injury  6/28/2020 1800 by Ming Manzano RN  Outcome: Ongoing  6/28/2020 1023 by Ming Manzano RN  Outcome: Ongoing  Goal: Free from intentional harm  Description: Free from intentional harm  6/28/2020 1800 by Ming Manzano RN  Outcome: Ongoing  6/28/2020 1023 by Ming Manzano RN  Outcome: Ongoing     Problem: Daily Care:  Goal: Daily care needs are met  Description: Daily care needs are met  6/28/2020 1800 by Ming Manzano RN  Outcome: Ongoing  6/28/2020 1023 by King Tk RN  Outcome: Ongoing     Problem: Discharge Planning:  Goal: Patients continuum of care needs are met  Description: Patients continuum of care needs are met  6/28/2020 1800 by King Tk RN  Outcome: Ongoing  6/28/2020 1023 by King Tk RN  Outcome: Ongoing

## 2020-06-29 ENCOUNTER — APPOINTMENT (OUTPATIENT)
Dept: GENERAL RADIOLOGY | Age: 50
DRG: 247 | End: 2020-06-29
Payer: COMMERCIAL

## 2020-06-29 LAB
ABSOLUTE EOS #: 0.11 K/UL (ref 0–0.44)
ABSOLUTE IMMATURE GRANULOCYTE: <0.03 K/UL (ref 0–0.3)
ABSOLUTE LYMPH #: 2.65 K/UL (ref 1.1–3.7)
ABSOLUTE MONO #: 0.46 K/UL (ref 0.1–1.2)
ANION GAP SERPL CALCULATED.3IONS-SCNC: 16 MMOL/L (ref 9–17)
BASOPHILS # BLD: 0 % (ref 0–2)
BASOPHILS ABSOLUTE: <0.03 K/UL (ref 0–0.2)
BUN BLDV-MCNC: 3 MG/DL (ref 6–20)
BUN/CREAT BLD: ABNORMAL (ref 9–20)
CALCIUM SERPL-MCNC: 9.2 MG/DL (ref 8.6–10.4)
CHLORIDE BLD-SCNC: 102 MMOL/L (ref 98–107)
CO2: 22 MMOL/L (ref 20–31)
CREAT SERPL-MCNC: 0.53 MG/DL (ref 0.5–0.9)
DIFFERENTIAL TYPE: ABNORMAL
EOSINOPHILS RELATIVE PERCENT: 2 % (ref 1–4)
GFR AFRICAN AMERICAN: >60 ML/MIN
GFR NON-AFRICAN AMERICAN: >60 ML/MIN
GFR SERPL CREATININE-BSD FRML MDRD: ABNORMAL ML/MIN/{1.73_M2}
GFR SERPL CREATININE-BSD FRML MDRD: ABNORMAL ML/MIN/{1.73_M2}
GLUCOSE BLD-MCNC: 94 MG/DL (ref 70–99)
HCT VFR BLD CALC: 42.2 % (ref 36.3–47.1)
HEMOGLOBIN: 13 G/DL (ref 11.9–15.1)
IMMATURE GRANULOCYTES: 0 %
LYMPHOCYTES # BLD: 46 % (ref 24–43)
MCH RBC QN AUTO: 28.1 PG (ref 25.2–33.5)
MCHC RBC AUTO-ENTMCNC: 30.8 G/DL (ref 28.4–34.8)
MCV RBC AUTO: 91.1 FL (ref 82.6–102.9)
MONOCYTES # BLD: 8 % (ref 3–12)
NRBC AUTOMATED: 0 PER 100 WBC
PDW BLD-RTO: 13.9 % (ref 11.8–14.4)
PLATELET # BLD: 243 K/UL (ref 138–453)
PLATELET ESTIMATE: ABNORMAL
PMV BLD AUTO: 10.1 FL (ref 8.1–13.5)
POTASSIUM SERPL-SCNC: 3.9 MMOL/L (ref 3.7–5.3)
RBC # BLD: 4.63 M/UL (ref 3.95–5.11)
RBC # BLD: ABNORMAL 10*6/UL
SEG NEUTROPHILS: 44 % (ref 36–65)
SEGMENTED NEUTROPHILS ABSOLUTE COUNT: 2.5 K/UL (ref 1.5–8.1)
SODIUM BLD-SCNC: 140 MMOL/L (ref 135–144)
WBC # BLD: 5.8 K/UL (ref 3.5–11.3)
WBC # BLD: ABNORMAL 10*3/UL

## 2020-06-29 PROCEDURE — 2580000003 HC RX 258: Performed by: STUDENT IN AN ORGANIZED HEALTH CARE EDUCATION/TRAINING PROGRAM

## 2020-06-29 PROCEDURE — 6360000002 HC RX W HCPCS: Performed by: STUDENT IN AN ORGANIZED HEALTH CARE EDUCATION/TRAINING PROGRAM

## 2020-06-29 PROCEDURE — 80048 BASIC METABOLIC PNL TOTAL CA: CPT

## 2020-06-29 PROCEDURE — 6370000000 HC RX 637 (ALT 250 FOR IP): Performed by: STUDENT IN AN ORGANIZED HEALTH CARE EDUCATION/TRAINING PROGRAM

## 2020-06-29 PROCEDURE — 74019 RADEX ABDOMEN 2 VIEWS: CPT

## 2020-06-29 PROCEDURE — 36415 COLL VENOUS BLD VENIPUNCTURE: CPT

## 2020-06-29 PROCEDURE — 85025 COMPLETE CBC W/AUTO DIFF WBC: CPT

## 2020-06-29 PROCEDURE — 1200000000 HC SEMI PRIVATE

## 2020-06-29 PROCEDURE — 6370000000 HC RX 637 (ALT 250 FOR IP): Performed by: SURGERY

## 2020-06-29 RX ORDER — MORPHINE SULFATE 2 MG/ML
2 INJECTION, SOLUTION INTRAMUSCULAR; INTRAVENOUS ONCE
Status: COMPLETED | OUTPATIENT
Start: 2020-06-29 | End: 2020-06-29

## 2020-06-29 RX ORDER — MAGNESIUM CARB/ALUMINUM HYDROX 105-160MG
30 TABLET,CHEWABLE ORAL DAILY
Status: DISCONTINUED | OUTPATIENT
Start: 2020-06-29 | End: 2020-06-30 | Stop reason: SDUPTHER

## 2020-06-29 RX ORDER — UREA 10 %
5 LOTION (ML) TOPICAL NIGHTLY PRN
Status: DISCONTINUED | OUTPATIENT
Start: 2020-06-29 | End: 2020-07-01 | Stop reason: HOSPADM

## 2020-06-29 RX ADMIN — AMLODIPINE BESYLATE 2.5 MG: 2.5 TABLET ORAL at 08:56

## 2020-06-29 RX ADMIN — HYDROCHLOROTHIAZIDE 25 MG: 25 TABLET ORAL at 08:56

## 2020-06-29 RX ADMIN — OXYCODONE HYDROCHLORIDE 5 MG: 5 TABLET ORAL at 01:09

## 2020-06-29 RX ADMIN — ACETAMINOPHEN 1000 MG: 500 TABLET ORAL at 21:07

## 2020-06-29 RX ADMIN — Medication 10 ML: at 21:07

## 2020-06-29 RX ADMIN — OXYCODONE HYDROCHLORIDE 5 MG: 5 TABLET ORAL at 12:38

## 2020-06-29 RX ADMIN — ACETAMINOPHEN 1000 MG: 500 TABLET ORAL at 06:50

## 2020-06-29 RX ADMIN — ENOXAPARIN SODIUM 40 MG: 40 INJECTION SUBCUTANEOUS at 08:56

## 2020-06-29 RX ADMIN — PANTOPRAZOLE SODIUM 40 MG: 40 TABLET, DELAYED RELEASE ORAL at 08:56

## 2020-06-29 RX ADMIN — MINERAL OIL 30 ML: 1000 SOLUTION ORAL at 08:56

## 2020-06-29 RX ADMIN — OXYCODONE HYDROCHLORIDE 5 MG: 5 TABLET ORAL at 22:31

## 2020-06-29 RX ADMIN — Medication 10 ML: at 08:57

## 2020-06-29 RX ADMIN — OXYCODONE HYDROCHLORIDE 5 MG: 5 TABLET ORAL at 06:53

## 2020-06-29 RX ADMIN — MORPHINE SULFATE 2 MG: 2 INJECTION, SOLUTION INTRAMUSCULAR; INTRAVENOUS at 02:38

## 2020-06-29 RX ADMIN — Medication 5 MG: at 22:32

## 2020-06-29 RX ADMIN — ACETAMINOPHEN 1000 MG: 500 TABLET ORAL at 14:08

## 2020-06-29 RX ADMIN — OXYCODONE HYDROCHLORIDE 5 MG: 5 TABLET ORAL at 18:23

## 2020-06-29 ASSESSMENT — PAIN SCALES - GENERAL
PAINLEVEL_OUTOF10: 7
PAINLEVEL_OUTOF10: 8
PAINLEVEL_OUTOF10: 8
PAINLEVEL_OUTOF10: 9
PAINLEVEL_OUTOF10: 8
PAINLEVEL_OUTOF10: 7
PAINLEVEL_OUTOF10: 7
PAINLEVEL_OUTOF10: 8
PAINLEVEL_OUTOF10: 8

## 2020-06-29 NOTE — CARE COORDINATION
TRANSITIONAL CARE PLANNING/ 2 [facility] Day:   4  Reason for Admission: Small bowel obstruction ([facility] [address] [X31.572]     Treatment Plan of Care: General Surgery following. Advancing diet as tolerated    Tests/Procedures still needed: serial abdominal exams    Barriers to Discharge: none    Readmission Risk              Risk of Unplanned Readmission:        14            Patient goals/Treatment Preferences/Transitional Plan:   Spoke with patient re: transition plans, states plan is to go home independent. She also states if home care is needed she would like [facility] care.   Referrals Made: none    Follow Up needed: watch for home care needs

## 2020-06-29 NOTE — PROGRESS NOTES
Nutrition Assessment    Type and Reason for Visit: Reassess    Nutrition Recommendations: Continue current diet and advance as medically appropriate. Nutrition Assessment: Pt was in room and alert. Stated that she had some abdominal pain yesterday but it has almost completely subsided. Pt stated her intake was 50% at breakfast. Pt did ask about education for foods she can eat with diverticulitis and I walked her through handouts. Malnutrition Assessment:  · Malnutrition Status: Insufficient data  · Context: Acute illness or injury  · Findings of the 6 clinical characteristics of malnutrition (Minimum of 2 out of 6 clinical characteristics is required to make the diagnosis of moderate or severe Protein Calorie Malnutrition based on AND/ASPEN Guidelines):  1. Energy Intake-Unable to assess, Unable to assess    2. Weight Loss-Unable to assess,    3. Fat Loss-Unable to assess,    4. Muscle Loss-Unable to assess,    5. Fluid Accumulation-No significant fluid accumulation,    6.  Strength-Not measured    Nutrition Risk Level: Moderate    Nutrient Needs:  · Estimated Daily Total Kcal: (1487 x 1.3)  · Estimated Daily Protein (g): (1.2-1.4)    Nutrition Diagnosis:   · Problem: Inadequate oral intake  · Etiology: related to (current medical condition)     Signs and symptoms:  as evidenced by Intake 25-50%    Objective Information:  · Nutrition-Focused Physical Findings: Labs and meds reviewed.    · Wound Type: None  · Current Nutrition Therapies:  · Oral Diet Orders: Full Liquid   · Oral Diet intake: 26-50%  · Anthropometric Measures:  · Ht: 5' 1\" (154.9 cm)   · Current Body Wt: 201 lb (91.2 kg)  · Ideal Body Wt: 105 lb (47.6 kg), % Ideal Body 191%  · BMI Classification: BMI 35.0 - 39.9 Obese Class II    Nutrition Interventions:   Continue current diet  Continued Inpatient Monitoring, Education Completed(provided education on diverticulitis)    Nutrition Evaluation:   · Evaluation: Goals set   · Goals: Intake of 75% or greater of recommended nutritional intake    · Monitoring: Nutrition Progression, Meal Intake, Weight, Pertinent Labs, Monitor Bowel Function      Electronically signed by Jazzy Holt RD, [place] on 6/29/20 at 1:37 PM EDT    Contact Number: [phone]

## 2020-06-29 NOTE — PROGRESS NOTES
Problems    Diagnosis Date Noted    Small bowel obstruction [facility]) [K56.609] 06/25/2020       54 y.o. female s/p Luci procedure (reversded), diverting loop ostomy (reversed), appy    Presented with 3 day history of abd pain, nausea, emesis found to have SBO     Plan:  1. Diet: FLD  2. Continue pain and nausea control  3. Monitor bowel fxn  4. Follow up AM labs  5.  Serial abdominal exams        Electronically signed by Chaz Adames DO

## 2020-06-29 NOTE — PLAN OF CARE
Problem: Pain:  Goal: Pain level will decrease  Description: Pain level will decrease  6/29/2020 0414 by Corey Galindo RN  Outcome: Ongoing  6/28/2020 1800 by Betsey Gilbert RN  Outcome: Ongoing  Goal: Control of acute pain  Description: Control of acute pain  6/29/2020 0414 by Corey Galindo RN  Outcome: Ongoing  6/28/2020 1800 by Betsey Gilbert RN  Outcome: Ongoing  Goal: Control of chronic pain  Description: Control of chronic pain  6/29/2020 0414 by Corey Galindo RN  Outcome: Ongoing  6/28/2020 1800 by Betsey Gilbert RN  Outcome: Ongoing  Goal: Patient's pain/discomfort is manageable  Description: Patient's pain/discomfort is manageable  6/29/2020 0414 by Corey Galindo RN  Outcome: Ongoing  6/28/2020 1800 by Betsey Gilbert RN  Outcome: Ongoing

## 2020-06-29 NOTE — PLAN OF CARE
Problem: Pain:  Goal: Pain level will decrease  Description: Pain level will decrease  6/29/2020 1938 by Eloisa Bloom RN  Outcome: Ongoing  6/29/2020 1908 by Mckayla Corona RN  Outcome: Ongoing  Goal: Control of acute pain  Description: Control of acute pain  6/29/2020 1938 by Eloisa Bloom RN  Outcome: Ongoing  6/29/2020 1908 by Mckayla Corona RN  Outcome: Ongoing  Goal: Control of chronic pain  Description: Control of chronic pain  6/29/2020 1938 by Eloisa Bloom RN  Outcome: Ongoing  6/29/2020 1908 by Mckayla Corona RN  Outcome: Ongoing  Goal: Patient's pain/discomfort is manageable  Description: Patient's pain/discomfort is manageable  6/29/2020 1938 by Eloisa Bloom RN  Outcome: Ongoing  6/29/2020 1908 by Mckayla Corona RN  Outcome: Ongoing     Problem: Falls - Risk of:  Goal: Will remain free from falls  Description: Will remain free from falls  6/29/2020 1938 by Eloisa Bloom RN  Outcome: Ongoing  6/29/2020 1908 by Mckayla Corona RN  Outcome: Ongoing  Goal: Absence of physical injury  Description: Absence of physical injury  6/29/2020 1938 by Eloisa Bloom RN  Outcome: Ongoing  6/29/2020 1908 by Mckayla Corona RN  Outcome: Ongoing     Problem: Infection:  Goal: Will remain free from infection  Description: Will remain free from infection  6/29/2020 1938 by Eloisa Bloom RN  Outcome: Ongoing  6/29/2020 1908 by Mckayla Corona RN  Outcome: Ongoing     Problem: Safety:  Goal: Free from accidental physical injury  Description: Free from accidental physical injury  6/29/2020 1938 by Eloisa Bloom RN  Outcome: Ongoing  6/29/2020 1908 by Mckayla Corona RN  Outcome: Ongoing  Goal: Free from intentional harm  Description: Free from intentional harm  6/29/2020 1938 by Eloisa Bloom RN  Outcome: Ongoing  6/29/2020 1908 by Mckayla Corona RN  Outcome: Ongoing     Problem: Daily Care:  Goal: Daily care needs are met  Description: Daily care needs are met  6/29/2020 1938 by Eloisa Bloom

## 2020-06-30 LAB
ANION GAP SERPL CALCULATED.3IONS-SCNC: 15 MMOL/L (ref 9–17)
BUN BLDV-MCNC: 4 MG/DL (ref 6–20)
BUN/CREAT BLD: ABNORMAL (ref 9–20)
CALCIUM SERPL-MCNC: 9.5 MG/DL (ref 8.6–10.4)
CHLORIDE BLD-SCNC: 101 MMOL/L (ref 98–107)
CO2: 23 MMOL/L (ref 20–31)
CREAT SERPL-MCNC: 0.61 MG/DL (ref 0.5–0.9)
GFR AFRICAN AMERICAN: >60 ML/MIN
GFR NON-AFRICAN AMERICAN: >60 ML/MIN
GFR SERPL CREATININE-BSD FRML MDRD: ABNORMAL ML/MIN/{1.73_M2}
GFR SERPL CREATININE-BSD FRML MDRD: ABNORMAL ML/MIN/{1.73_M2}
GLUCOSE BLD-MCNC: 102 MG/DL (ref 70–99)
POTASSIUM SERPL-SCNC: 3.7 MMOL/L (ref 3.7–5.3)
SODIUM BLD-SCNC: 139 MMOL/L (ref 135–144)

## 2020-06-30 PROCEDURE — 36415 COLL VENOUS BLD VENIPUNCTURE: CPT

## 2020-06-30 PROCEDURE — 6370000000 HC RX 637 (ALT 250 FOR IP): Performed by: SURGERY

## 2020-06-30 PROCEDURE — 80048 BASIC METABOLIC PNL TOTAL CA: CPT

## 2020-06-30 PROCEDURE — 6360000002 HC RX W HCPCS: Performed by: STUDENT IN AN ORGANIZED HEALTH CARE EDUCATION/TRAINING PROGRAM

## 2020-06-30 PROCEDURE — 1200000000 HC SEMI PRIVATE

## 2020-06-30 PROCEDURE — 6370000000 HC RX 637 (ALT 250 FOR IP): Performed by: STUDENT IN AN ORGANIZED HEALTH CARE EDUCATION/TRAINING PROGRAM

## 2020-06-30 RX ORDER — MAGNESIUM CARB/ALUMINUM HYDROX 105-160MG
30 TABLET,CHEWABLE ORAL DAILY
Status: DISCONTINUED | OUTPATIENT
Start: 2020-06-30 | End: 2020-07-01 | Stop reason: HOSPADM

## 2020-06-30 RX ORDER — ONDANSETRON 4 MG/1
4 TABLET, ORALLY DISINTEGRATING ORAL ONCE
Status: COMPLETED | OUTPATIENT
Start: 2020-06-30 | End: 2020-06-30

## 2020-06-30 RX ADMIN — OXYCODONE HYDROCHLORIDE 5 MG: 5 TABLET ORAL at 23:08

## 2020-06-30 RX ADMIN — ACETAMINOPHEN 1000 MG: 500 TABLET ORAL at 21:30

## 2020-06-30 RX ADMIN — OXYCODONE HYDROCHLORIDE 5 MG: 5 TABLET ORAL at 02:50

## 2020-06-30 RX ADMIN — ACETAMINOPHEN 1000 MG: 500 TABLET ORAL at 13:37

## 2020-06-30 RX ADMIN — OXYCODONE HYDROCHLORIDE 5 MG: 5 TABLET ORAL at 19:10

## 2020-06-30 RX ADMIN — OXYCODONE HYDROCHLORIDE 5 MG: 5 TABLET ORAL at 11:58

## 2020-06-30 RX ADMIN — ONDANSETRON 4 MG: 4 TABLET, ORALLY DISINTEGRATING ORAL at 03:00

## 2020-06-30 RX ADMIN — AMLODIPINE BESYLATE 2.5 MG: 2.5 TABLET ORAL at 08:53

## 2020-06-30 RX ADMIN — PANTOPRAZOLE SODIUM 40 MG: 40 TABLET, DELAYED RELEASE ORAL at 08:54

## 2020-06-30 RX ADMIN — HYDROCHLOROTHIAZIDE 25 MG: 25 TABLET ORAL at 08:54

## 2020-06-30 RX ADMIN — Medication 5 MG: at 23:08

## 2020-06-30 RX ADMIN — MINERAL OIL 30 ML: 1000 SOLUTION ORAL at 08:54

## 2020-06-30 RX ADMIN — OXYCODONE HYDROCHLORIDE 5 MG: 5 TABLET ORAL at 07:45

## 2020-06-30 RX ADMIN — ENOXAPARIN SODIUM 40 MG: 40 INJECTION SUBCUTANEOUS at 08:54

## 2020-06-30 RX ADMIN — OXYCODONE HYDROCHLORIDE 5 MG: 5 TABLET ORAL at 16:09

## 2020-06-30 ASSESSMENT — PAIN SCALES - GENERAL
PAINLEVEL_OUTOF10: 10
PAINLEVEL_OUTOF10: 8
PAINLEVEL_OUTOF10: 6
PAINLEVEL_OUTOF10: 7
PAINLEVEL_OUTOF10: 8
PAINLEVEL_OUTOF10: 7
PAINLEVEL_OUTOF10: 7
PAINLEVEL_OUTOF10: 8

## 2020-06-30 NOTE — PROGRESS NOTES
General Surgery:  Daily Progress Note              PATIENT NAME: Virgene Eisenmenger     TODAY'S DATE: 6/30/2020, 6:12 AM  CC:  Abdominal pain    SUBJECTIVE:     Pt seen and examined at bedside. No events overnight. Afebrile. Her abdominal pain is improved from yesterday, still some epigastric/LLQ pain. Slight nausea overnight improved with zofran. +flatus, no stools yesterday. OBJECTIVE:   VITALS:  BP (!) 160/106   Pulse 89   Temp 98.4 °F (36.9 °C) (Oral)   Resp 18   Ht 5' 1\" (1.549 m)   Wt 201 lb 12.8 oz (91.5 kg)   SpO2 99%   BMI 38.13 kg/m²      INTAKE/OUTPUT:      Intake/Output Summary (Last 24 hours) at 6/30/2020 0612  Last data filed at 6/29/2020 0654  Gross per 24 hour   Intake 866 ml   Output --   Net 866 ml       PHYSICAL EXAM:  General Appearance:  awake, alert, oriented, in no acute distress  HEENT:  Normocephalic, atraumatic, mucus membranes moist   Skin:  Skin color, texture, turgor normal. No rashes or lesions. Lungs: Effort normal, no respiratory distress, no accessory muscle use   Heart: Regular rate and rhythm  Abdomen:  Soft, nondistended, TTP in RLQ and eipigastric area, nonperitoneal   Extremities: Extremities warm to touch, pink, with no edema.       Data:  CBC:   Lab Results   Component Value Date    WBC 5.8 06/29/2020    RBC 4.63 06/29/2020    RBC 4.13 10/26/2011    HGB 13.0 06/29/2020    HCT 42.2 06/29/2020    MCV 91.1 06/29/2020    MCH 28.1 06/29/2020    MCHC 30.8 06/29/2020    RDW 13.9 06/29/2020     06/29/2020     10/26/2011    MPV 10.1 06/29/2020     BMP:    Lab Results   Component Value Date     06/29/2020    K 3.9 06/29/2020     06/29/2020    CO2 22 06/29/2020    BUN 3 06/29/2020    LABALBU 4.9 06/25/2020    LABALBU 4.7 10/26/2011    CREATININE 0.53 06/29/2020    CALCIUM 9.2 06/29/2020    GFRAA >60 06/29/2020    LABGLOM >60 06/29/2020    GLUCOSE 94 06/29/2020          ASSESSMENT:  Active Hospital Problems    Diagnosis Date Noted    Small bowel obstruction [facility]) [P38.667] 06/25/2020       54 y.o. female s/p Luci procedure (reversded), diverting loop ostomy (reversed), appy    Presented with 3 day history of abd pain, nausea, emesis found to have SBO     Plan:  1. Diet: Will advance to low fiber  2. Continue pain and nausea control  3. Monitor bowel fxn  4. Mineral oil   5. Follow up AM labs  6.  Serial abdominal exams        Electronically signed by Jaycee Diaz DO

## 2020-07-01 VITALS
TEMPERATURE: 97.8 F | BODY MASS INDEX: 38.1 KG/M2 | OXYGEN SATURATION: 100 % | HEART RATE: 98 BPM | SYSTOLIC BLOOD PRESSURE: 132 MMHG | HEIGHT: 61 IN | WEIGHT: 201.8 LBS | DIASTOLIC BLOOD PRESSURE: 84 MMHG | RESPIRATION RATE: 16 BRPM

## 2020-07-01 PROCEDURE — 6370000000 HC RX 637 (ALT 250 FOR IP): Performed by: STUDENT IN AN ORGANIZED HEALTH CARE EDUCATION/TRAINING PROGRAM

## 2020-07-01 PROCEDURE — 6360000002 HC RX W HCPCS: Performed by: STUDENT IN AN ORGANIZED HEALTH CARE EDUCATION/TRAINING PROGRAM

## 2020-07-01 RX ORDER — ACETAMINOPHEN 500 MG
1000 TABLET ORAL EVERY 8 HOURS PRN
Status: DISCONTINUED | OUTPATIENT
Start: 2020-07-01 | End: 2020-07-01 | Stop reason: HOSPADM

## 2020-07-01 RX ORDER — MAGNESIUM CARB/ALUMINUM HYDROX 105-160MG
30 TABLET,CHEWABLE ORAL DAILY
Qty: 900 ML | Refills: 3 | Status: SHIPPED | OUTPATIENT
Start: 2020-07-01 | End: 2020-10-29

## 2020-07-01 RX ADMIN — PANTOPRAZOLE SODIUM 40 MG: 40 TABLET, DELAYED RELEASE ORAL at 10:06

## 2020-07-01 RX ADMIN — ENOXAPARIN SODIUM 40 MG: 40 INJECTION SUBCUTANEOUS at 10:06

## 2020-07-01 RX ADMIN — MINERAL OIL 30 ML: 1000 SOLUTION ORAL at 10:06

## 2020-07-01 RX ADMIN — OXYCODONE HYDROCHLORIDE 5 MG: 5 TABLET ORAL at 03:21

## 2020-07-01 RX ADMIN — AMLODIPINE BESYLATE 2.5 MG: 2.5 TABLET ORAL at 10:06

## 2020-07-01 RX ADMIN — HYDROCHLOROTHIAZIDE 25 MG: 25 TABLET ORAL at 10:06

## 2020-07-01 ASSESSMENT — PAIN SCALES - GENERAL: PAINLEVEL_OUTOF10: 8

## 2020-07-01 NOTE — DISCHARGE INSTR - OTHER ORDERS
Diet for Irritable Bowel Syndrome: Care Instructions  Your Care Instructions     Irritable bowel syndrome, or IBS, is a problem with the intestines. IBS can cause belly pain, bloating, gas, constipation, and diarrhea. Most people can control their symptoms by changing their diet and easing stress. No specific foods cause everyone with IBS to have symptoms. Many people find that they feel better by limiting or eliminating foods that may bring on symptoms. Make sure you don't stop eating all foods from any one food group without talking with a dietitian. You need to make sure you are still getting all the nutrients you need. Follow-up care is a key part of your treatment and safety. Be sure to make and go to all appointments, and call your doctor if you are having problems. It's also a good idea to know your test results and keep a list of the medicines you take. How can you care for yourself at home? To reduce constipation  · Include fruits, vegetables, beans, and whole grains in your diet each day. These foods are high in fiber. Slowly increase the amount of fiber you eat. This helps you avoid a lot of gas. · Drink plenty of fluids. If you have kidney, heart, or liver disease and have to limit fluids, talk with your doctor before you increase the amount of fluids you drink. · Get some exercise every day. Build up slowly to 30 to 60 minutes a day on 5 or more days of the week. · Take a fiber supplement, such as Citrucel or Metamucil, every day if needed. Read and follow all instructions on the label. · Schedule time each day for a bowel movement. Having a daily routine may help. Take your time and do not strain when having a bowel movement. · Check with your doctor before you increase the amount of fiber in your diet. For some people who have IBS, eating more fiber may make some symptoms worse. This includes bloating.   To reduce diarrhea  You may try giving up foods or drinks one at a time to see whether symptoms improve. Limit or avoid the following:  · Alcohol  · Caffeine, which is found in coffee, tea, cola drinks, and chocolate  · Nicotine, from smoking or chewing tobacco  · Gas-producing foods, such as beans, broccoli, cabbage, and apples  · Dairy products that contain lactose (milk sugar), such as ice cream and milk. · Foods and drinks high in sugar, especially fruit juice, soda, candy, and other packaged sweets (such as cookies)  · Foods high in fat, including moyer, sausage, butter, oils, and anything deep-fried  · Sorbitol and xylitol, artificial sweeteners found in some sugarless candies and chewing gum  Keep track of foods  · Some people with IBS use a daily food diary to keep track of what they eat and whether they have any symptoms after eating certain foods. The diary also can be a good way to record what is going on in your life. · Stress plays a role in IBS. So if you are aware that certain stresses bring on symptoms, you can try to reduce those stresses. Keep mealtimes pleasant  · Try to maintain a pleasant environment when you eat. This may reduce stress that can make symptoms likely to occur. · Give yourself plenty of time to eat, rather than eating on the go. Chew your food slowly. Try not to swallow air, which can cause bloating. Where can you learn more? Go to https://ZapHourpecortezeweb.fitogram. org and sign in to your [employer] account. Enter O733 in the [facility] box to learn more about \"Diet for Irritable Bowel Syndrome: Care Instructions. \"     If you do not have an account, please click on the \"Sign Up Now\" link. Current as of: August 22, 2019               Content Version: 12.5  © 0910-0756 Healthwise, Incorporated. Care instructions adapted under license by [facility] ([facility]).  If you have questions about a medical condition or this instruction, always ask your healthcare professional. [place] any warranty or liability for your use of this information. Gastritis: Care Instructions  Your Care Instructions     Gastritis is a sore and upset stomach. It happens when something irritates the stomach lining. Many things can cause it. These include an infection such as the flu or something you ate or drank. Medicines or a sore on the lining of the stomach (ulcer) also can cause it. Your belly may bloat and ache. You may belch, vomit, and feel sick to your stomach. You should be able to relieve the problem by taking medicine. And it may help to change your diet. If gastritis lasts, your doctor may prescribe medicine. Follow-up care is a key part of your treatment and safety. Be sure to make and go to all appointments, and call your doctor if you are having problems. It's also a good idea to know your test results and keep a list of the medicines you take. How can you care for yourself at home? · If your doctor prescribed antibiotics, take them as directed. Do not stop taking them just because you feel better. You need to take the full course of antibiotics. · Be safe with medicines. If your doctor prescribed medicine to decrease stomach acid, take it as directed. Call your doctor if you think you are having a problem with your medicine. · Do not take any other medicine, including over-the-counter pain relievers, without talking to your doctor first.  · If your doctor recommends over-the-counter medicine to reduce stomach acid, such as Pepcid AC (famotidine), Prilosec (omeprazole), or Tagamet HB (cimetidine) follow the directions on the label. · Drink plenty of fluids (enough so that your urine is light yellow or clear like water) to prevent dehydration. Choose water and other caffeine-free clear liquids. If you have kidney, heart, or liver disease and have to limit fluids, talk with your doctor before you increase the amount of fluids you drink. · Limit how much alcohol you drink.   · Avoid coffee, tea, cola drinks, chocolate, and other foods with caffeine. They increase stomach acid. When should you call for help? [id number] anytime you think you may need emergency care. For example, call if:  · You vomit blood or what looks like coffee grounds. · You pass maroon or very bloody stools. Call your doctor now or seek immediate medical care if:  · You start breathing fast and have not produced urine in the last 8 hours. · You cannot keep fluids down. Watch closely for changes in your health, and be sure to contact your doctor if:  · You do not get better as expected. Where can you learn more? Go to https://chpepiceweb.Bridgevine. org and sign in to your [employer] account. Enter [id number] in the [employer] box to learn more about \"Gastritis: Care Instructions. \"     If you do not have an account, please click on the \"Sign Up Now\" link. Current as of: August 12, 2019               Content Version: 12.5  © 1778-1156 [employer]. Care instructions adapted under license by [facility] ([facility]). If you have questions about a medical condition or this instruction, always ask your healthcare professional. [place] [address] any warranty or liability for your use of this information. Bowel Blockage (Intestinal Obstruction): Care Instructions  Your Care Instructions  A bowel blockage, also called an intestinal obstruction, can prevent gas, fluids, or solids from moving through the intestines normally. It can cause constipation and, rarely, diarrhea. You may have pain, nausea, vomiting, and cramping. Most of the time, complete blockages require a stay in the hospital and possibly surgery. But if your bowel is only partly blocked, your doctor may tell you to wait until it clears on its own and you are able to pass gas and stool. If so, there are things you can do at home to help make you feel better. If you have had surgery for a bowel blockage, there are things you can do at home to make sure you heal well.  You can also make some changes to keep your bowel from becoming blocked again. Follow-up care is a key part of your treatment and safety. Be sure to make and go to all appointments, and call your doctor if you are having problems. It's also a good idea to know your test results and keep a list of the medicines you take. How can you care for yourself at home? If your doctor has told you to wait at home for a blockage to clear on its own:  · Follow your doctor's instructions. These may include eating a liquid diet to avoid complete blockage. · Be safe with medicines. Take your medicines exactly as prescribed. Call your doctor if you think you are having a problem with your medicine. · Put a heating pad set on low on your belly to relieve mild cramps and pain. To prevent another blockage  · Try to eat smaller amounts of food more often. For example, have 5 or 6 small meals throughout the day instead of 2 or 3 large meals. · Chew your food very well. Try to chew each bite about 20 times or until it is liquid. · Avoid high-fiber foods and raw fruits and vegetables with skins, husks, strings, or seeds. These can form a ball of undigested material that can cause a blockage if a part of your bowel is scarred or narrowed. · Check with your doctor before you eat whole-grain products or use a fiber supplement such as Citrucel or Metamucil. · To help you have regular bowel movements, eat at regular times, do not strain during a bowel movement, and drink at least 8 to 10 glasses of water each day. If you have kidney, heart, or liver disease and have to limit fluids, talk with your doctor or before you increase the amount of fluids you drink. · Drink high-calorie liquid formulas if your doctor says to. Severe symptoms may make it hard for your body to take in vitamins and minerals. · Get regular exercise. It helps you digest your food better. Get at least 30 minutes of physical activity on most days of the week.  Walking is a good choice. When should you call for help? Call your doctor now or seek immediate medical care if:  · You have a fever. · You are vomiting. · You have new or worse belly pain. · You cannot pass stools or gas. Watch closely for changes in your health, and be sure to contact your doctor if you have any problems. Where can you learn more? Go to https://Axium Nanofiberspepiceweb.IdeaString. org and sign in to your [employer] account. Enter N163 in the [employer] box to learn more about \"Bowel Blockage (Intestinal Obstruction): Care Instructions. \"     If you do not have an account, please click on the \"Sign Up Now\" link. Current as of: August 12, 2019               Content Version: 12.5  © 3419-9286 Healthwise, Incorporated. Care instructions adapted under license by [facility] [address] If you have questions about a medical condition or this instruction, always ask your healthcare professional. [place] [address] any warranty or liability for your use of this information.

## 2020-07-01 NOTE — PROGRESS NOTES
General Surgery:  Daily Progress Note              PATIENT NAME: Mariajose Basilio     TODAY'S DATE: 7/1/2020, 6:00 AM  CC:  Abdominal pain    SUBJECTIVE:      Patient examined at bedside. Afebrile overnight. Abdominal pain continues to improve. Patient was able to tolerate low fiber diet. Patient admits to slight bloating. +BM small formed stool. Overall feeling better. OBJECTIVE:   VITALS:  BP (!) 145/82   Pulse 80   Temp 98.5 °F (36.9 °C) (Oral)   Resp 17   Ht 5' 1\" (1.549 m)   Wt 201 lb 12.8 oz (91.5 kg)   SpO2 98%   BMI 38.13 kg/m²      INTAKE/OUTPUT:    No intake or output data in the 24 hours ending 07/01/20 0600    PHYSICAL EXAM:  General Appearance:  awake, alert, oriented, in no acute distress  HEENT:  Normocephalic, atraumatic, mucus membranes moist   Skin:  Skin color, texture, turgor normal. No rashes or lesions. Lungs: Effort normal, no respiratory distress, no accessory muscle use   Heart: Regular rate and rhythm  Abdomen:  Soft, nondistended, nontender to palpation  Extremities: Extremities warm to touch, pink, with no edema.       Data:  CBC:   Lab Results   Component Value Date    WBC 5.8 06/29/2020    RBC 4.63 06/29/2020    RBC 4.13 10/26/2011    HGB 13.0 06/29/2020    HCT 42.2 06/29/2020    MCV 91.1 06/29/2020    MCH 28.1 06/29/2020    MCHC 30.8 06/29/2020    RDW 13.9 06/29/2020     06/29/2020     10/26/2011    MPV 10.1 06/29/2020     BMP:    Lab Results   Component Value Date     06/30/2020    K 3.7 06/30/2020     06/30/2020    CO2 23 06/30/2020    BUN 4 06/30/2020    LABALBU 4.9 06/25/2020    LABALBU 4.7 10/26/2011    CREATININE 0.61 06/30/2020    CALCIUM 9.5 06/30/2020    GFRAA >60 06/30/2020    LABGLOM >60 06/30/2020    GLUCOSE 102 06/30/2020          ASSESSMENT:  Active Hospital Problems    Diagnosis Date Noted    Small bowel obstruction [facility]) [K56.609] 06/25/2020       54 y.o. female s/p Luci procedure (reversded), diverting loop ostomy (reversed), peter    Presented with 3 day history of abd pain, nausea, emesis found to have SBO     Plan:  1. Diet: Continue low fiber  2. Continue pain and nausea control- dc narcotics  3. Monitor bowel fxn  4. Mineral oil   5. Follow up AM labs  6.  Serial abdominal exams  7. DC planning today         Electronically signed by Amanda Soni MD

## 2020-07-01 NOTE — PLAN OF CARE
Problem: Pain:  Goal: Pain level will decrease  Description: Pain level will decrease  Outcome: Ongoing  Goal: Control of acute pain  Description: Control of acute pain  Outcome: Ongoing  Goal: Control of chronic pain  Description: Control of chronic pain  Outcome: Ongoing  Goal: Patient's pain/discomfort is manageable  Description: Patient's pain/discomfort is manageable  Outcome: Ongoing     Problem: Falls - Risk of:  Goal: Will remain free from falls  Description: Will remain free from falls  Outcome: Ongoing  Goal: Absence of physical injury  Description: Absence of physical injury  Outcome: Ongoing     Problem: Infection:  Goal: Will remain free from infection  Description: Will remain free from infection  Outcome: Ongoing

## 2020-07-02 ENCOUNTER — TELEPHONE (OUTPATIENT)
Dept: FAMILY MEDICINE CLINIC | Age: 50
End: 2020-07-02

## 2020-07-02 NOTE — DISCHARGE SUMMARY
Surgery Discharge Summary     Patient Identification  Jose Eduardo Baron is a 54 y.o. female. :  1970  Admit Date:  2020    Discharge date:   2020 12:40 PM                                   Disposition: home    Discharge Diagnoses:   Patient Active Problem List   Diagnosis    Major depressive disorder, recurrent episode, severe ([address]    Bilateral lower abdominal pain    Irritable bowel syndrome with constipation and diarrhea    Uterine leiomyoma    Mirena IUD (17)    Abnormal uterine bleeding (AUB)    Small bowel obstruction (HCC)       Condition on discharge: Good     Consults: Cardiology    Surgery: N/A    Patient Instructions: Activity: No limitations  Diet: low fiber diet, chew foods throughly   Follow-up with in 2 weeks. See pre-printed instructions in chart and given to patient upon discharge.     Discharge Medications:      Hermila Deutsch   Home Medication Instructions VP    Printed on:20 1866   Medication Information                      albuterol sulfate  (90 Base) MCG/ACT inhaler  INHALE TWO (2) PUFFS BY MOUTH FOUR TIMES A DAY AS NEEDED FOR WHEEZING             amLODIPine (NORVASC) 2.5 MG tablet  TAKE 1 TABLET BY MOUTH DAILY             butenafine (LOTRIMIN ULTRA) 1 % CREA  Apply 1 Tube topically as needed (apply to affected area as needed)             Cholecalciferol (VITAMIN D3) 2000 units CAPS  Take 1 capsule by mouth daily             CLEARLAX powder  MIX 17 GRAMS IN LIQUID AND DRINK BY MOUTH DAILY             cloNIDine (CATAPRES) 0.3 MG tablet  TAKE ONE (1) TABLET BY MOUTH TWICE DAILY             colestipol (COLESTID) 1 g tablet  TAKE 1 TABLET BY MOUTH EVERY MORNING             estradiol (ESTRACE) 1 MG tablet  Take 1 mg by mouth daily             folic acid (FOLVITE) 1 MG tablet  TAKE (1) TABLET BY MOUTH DAILY             hydrochlorothiazide (HYDRODIURIL) 25 MG tablet  TAKE (1) TABLET BY MOUTH DAILY             loratadine (CLARITIN) 10 MG tablet  TAKE 1 TABLET BY MOUTH NIGHTLY AS NEEDED FOR SEASONAL ALLERGIES             losartan (COZAAR) 25 MG tablet  TAKE 1 TABLET BY MOUTH DAILY             meclizine (ANTIVERT) 12.5 MG tablet  TAKE 1 TABLET BY MOUTH THREE TIMES DAILY AS NEEDED FOR DIZZINESS OR NAUSEA             mineral oil liquid  Take 30 mLs by mouth daily             Multiple Vitamins-Minerals (HM COMPLETE 50+) TABS  One tablet QD             omeprazole (PRILOSEC) 20 MG delayed release capsule  Take 1 capsule by mouth daily (with breakfast)             ondansetron (ZOFRAN-ODT) 4 MG disintegrating tablet  Take 1 tablet by mouth 3 times daily as needed for Nausea or Vomiting             prazosin (MINIPRESS) 2 MG capsule               sertraline (ZOLOFT) 100 MG tablet  Take 100 mg by mouth             Sodium Phosphates (FLEET) 7-19 GM/118ML  Place 1 enema rectally daily as needed (constipation). traZODone (DESYREL) 100 MG tablet  Take 200 mg by mouth nightly as needed for Sleep                  HPI and Hospital Course:   54 y.o. female presented on 6/25/2020 for small bowel obstruction at her ileostomy takedown staple line. NG tube was placed. Abdominal films were obtained. Medical management with mineral oil and adequate pain control relieved SBO. Hospital course was unremarkable. On day of discharge pt was tolerating low fiber diet, pain controlled with oral medications and ambulating without difficulty.       Electronically signed by Arianna López MD on 7/2/2020 at 6:32 AM

## 2020-09-17 ENCOUNTER — TELEPHONE (OUTPATIENT)
Dept: FAMILY MEDICINE CLINIC | Age: 50
End: 2020-09-17

## 2020-09-17 ENCOUNTER — OFFICE VISIT (OUTPATIENT)
Dept: FAMILY MEDICINE CLINIC | Age: 50
End: 2020-09-17
Payer: COMMERCIAL

## 2020-09-17 ENCOUNTER — HOSPITAL ENCOUNTER (OUTPATIENT)
Age: 50
Setting detail: SPECIMEN
Discharge: HOME | End: 2020-09-17
Payer: COMMERCIAL

## 2020-09-17 ENCOUNTER — OFFICE VISIT (OUTPATIENT)
Dept: PRIMARY CARE CLINIC | Age: 50
End: 2020-09-17
Payer: COMMERCIAL

## 2020-09-17 VITALS
DIASTOLIC BLOOD PRESSURE: 92 MMHG | BODY MASS INDEX: 38.71 KG/M2 | WEIGHT: 205 LBS | TEMPERATURE: 98 F | HEIGHT: 61 IN | SYSTOLIC BLOOD PRESSURE: 150 MMHG | HEART RATE: 72 BPM | OXYGEN SATURATION: 99 %

## 2020-09-17 VITALS
DIASTOLIC BLOOD PRESSURE: 73 MMHG | HEIGHT: 61 IN | SYSTOLIC BLOOD PRESSURE: 142 MMHG | WEIGHT: 205 LBS | HEART RATE: 73 BPM | BODY MASS INDEX: 38.71 KG/M2 | TEMPERATURE: 97.2 F

## 2020-09-17 PROCEDURE — 1036F TOBACCO NON-USER: CPT | Performed by: INTERNAL MEDICINE

## 2020-09-17 PROCEDURE — G8427 DOCREV CUR MEDS BY ELIG CLIN: HCPCS | Performed by: HOSPITALIST

## 2020-09-17 PROCEDURE — 99213 OFFICE O/P EST LOW 20 MIN: CPT | Performed by: INTERNAL MEDICINE

## 2020-09-17 PROCEDURE — G8417 CALC BMI ABV UP PARAM F/U: HCPCS | Performed by: HOSPITALIST

## 2020-09-17 PROCEDURE — 1036F TOBACCO NON-USER: CPT | Performed by: HOSPITALIST

## 2020-09-17 PROCEDURE — G8417 CALC BMI ABV UP PARAM F/U: HCPCS | Performed by: INTERNAL MEDICINE

## 2020-09-17 PROCEDURE — G8427 DOCREV CUR MEDS BY ELIG CLIN: HCPCS | Performed by: INTERNAL MEDICINE

## 2020-09-17 PROCEDURE — 99213 OFFICE O/P EST LOW 20 MIN: CPT | Performed by: STUDENT IN AN ORGANIZED HEALTH CARE EDUCATION/TRAINING PROGRAM

## 2020-09-17 RX ORDER — AMLODIPINE BESYLATE 2.5 MG/1
5 TABLET ORAL DAILY
Qty: 30 TABLET | Refills: 10 | Status: SHIPPED | OUTPATIENT
Start: 2020-09-17 | End: 2020-09-17

## 2020-09-17 RX ORDER — HYDROCHLOROTHIAZIDE 25 MG/1
TABLET ORAL
Qty: 30 TABLET | Refills: 10 | Status: SHIPPED | OUTPATIENT
Start: 2020-09-17 | End: 2020-12-25 | Stop reason: SDUPTHER

## 2020-09-17 RX ORDER — PEN NEEDLE, DIABETIC 32GX 5/32"
1 NEEDLE, DISPOSABLE MISCELLANEOUS DAILY PRN
Qty: 150 EACH | Refills: 3 | Status: SHIPPED | OUTPATIENT
Start: 2020-09-17 | End: 2020-11-03 | Stop reason: SDUPTHER

## 2020-09-17 RX ORDER — MECLIZINE HCL 12.5 MG/1
TABLET ORAL
Qty: 15 TABLET | Refills: 10 | Status: SHIPPED | OUTPATIENT
Start: 2020-09-17 | End: 2020-10-23

## 2020-09-17 RX ORDER — AMLODIPINE BESYLATE 5 MG/1
5 TABLET ORAL DAILY
Qty: 30 TABLET | Refills: 3 | Status: SHIPPED | OUTPATIENT
Start: 2020-09-17 | End: 2021-09-17

## 2020-09-17 RX ORDER — CHOLECALCIFEROL (VITAMIN D3) 50 MCG
CAPSULE ORAL
Qty: 30 TABLET | Refills: 11 | Status: SHIPPED | OUTPATIENT
Start: 2020-09-17 | End: 2021-11-22

## 2020-09-17 RX ORDER — ONDANSETRON 4 MG/1
4 TABLET, ORALLY DISINTEGRATING ORAL 3 TIMES DAILY PRN
Qty: 21 TABLET | Refills: 0 | Status: SHIPPED | OUTPATIENT
Start: 2020-09-17

## 2020-09-17 RX ORDER — MONTELUKAST SODIUM 4 MG/1
1 TABLET, CHEWABLE ORAL EVERY MORNING
Qty: 30 TABLET | Refills: 10 | Status: SHIPPED | OUTPATIENT
Start: 2020-09-17 | End: 2020-10-23

## 2020-09-17 RX ORDER — FOLIC ACID 1 MG/1
TABLET ORAL
Qty: 90 TABLET | Refills: 10 | Status: SHIPPED | OUTPATIENT
Start: 2020-09-17 | End: 2021-09-17

## 2020-09-17 RX ORDER — ALBUTEROL SULFATE 90 UG/1
AEROSOL, METERED RESPIRATORY (INHALATION)
Qty: 54 G | Refills: 10 | Status: SHIPPED | OUTPATIENT
Start: 2020-09-17 | End: 2021-09-17

## 2020-09-17 RX ORDER — OMEPRAZOLE 20 MG/1
20 CAPSULE, DELAYED RELEASE ORAL
Qty: 30 CAPSULE | Refills: 11 | Status: SHIPPED | OUTPATIENT
Start: 2020-09-17 | End: 2020-11-30 | Stop reason: SDUPTHER

## 2020-09-17 RX ORDER — LOSARTAN POTASSIUM 25 MG/1
25 TABLET ORAL DAILY
Qty: 30 TABLET | Refills: 10 | Status: SHIPPED | OUTPATIENT
Start: 2020-09-17 | End: 2020-10-23

## 2020-09-17 RX ORDER — LORATADINE 10 MG/1
TABLET ORAL
Qty: 30 TABLET | Refills: 10 | Status: SHIPPED | OUTPATIENT
Start: 2020-09-17 | End: 2020-12-25 | Stop reason: SDUPTHER

## 2020-09-17 RX ORDER — ACETAMINOPHEN 160 MG
2000 TABLET,DISINTEGRATING ORAL DAILY
Qty: 30 CAPSULE | Refills: 11 | Status: SHIPPED | OUTPATIENT
Start: 2020-09-17 | End: 2020-10-23

## 2020-09-17 RX ORDER — CLONIDINE HYDROCHLORIDE 0.3 MG/1
TABLET ORAL
Qty: 60 TABLET | Refills: 10 | Status: SHIPPED | OUTPATIENT
Start: 2020-09-17 | End: 2021-09-17

## 2020-09-17 ASSESSMENT — ENCOUNTER SYMPTOMS
NAUSEA: 0
ABDOMINAL PAIN: 0
SORE THROAT: 1
BACK PAIN: 0
CONSTIPATION: 0
SHORTNESS OF BREATH: 0
CHEST TIGHTNESS: 0
VOMITING: 0
COUGH: 1
FLU SYMPTOMS: 1
DIARRHEA: 0
COLOR CHANGE: 0
APNEA: 0
ABDOMINAL DISTENTION: 0

## 2020-09-17 NOTE — PROGRESS NOTES
Attending Physician Statement  I have discussed the care of Princess Rizo, 54 y.o. female,including pertinent history and exam findings,  with the resident Dr. Nayeli Sandhu MD.  History:  Chief Complaint   Patient presents with    Medication Refill     Patient is here for follow up on resistant hypertension. I have reviewed the key elements of the encounter with the resident. Examination was done by resident as documented in residents note. BP Readings from Last 3 Encounters:   09/17/20 (!) 150/92   09/17/20 (!) 142/73   07/01/20 132/84     BP (!) 142/73   Pulse 73   Temp 97.2 °F (36.2 °C) (Temporal)   Ht 5' 1\" (1.549 m)   Wt 205 lb (93 kg)   BMI 38.73 kg/m²   Lab Results   Component Value Date    WBC 5.8 06/29/2020    HGB 13.0 06/29/2020    HCT 42.2 06/29/2020     06/29/2020    CHOL 146 09/16/2019    TRIG 154 (H) 09/16/2019    HDL 70 09/16/2019    ALT 15 06/25/2020    AST 17 06/25/2020     06/30/2020    K 3.7 06/30/2020     06/30/2020    CREATININE 0.61 06/30/2020    BUN 4 (L) 06/30/2020    CO2 23 06/30/2020    TSH 1.03 12/21/2016    LABA1C 5.5 09/16/2019    LABMICR 11 10/26/2011     Lab Results   Component Value Date    CALCIUM 9.5 06/30/2020    PHOS 2.6 06/26/2020     Lab Results   Component Value Date    LDLCHOLESTEROL 45 09/16/2019     I agree with the assessment, plan and diagnosis of    Diagnosis Orders   1. Irritable bowel syndrome with constipation and diarrhea  ondansetron (ZOFRAN-ODT) 4 MG disintegrating tablet    omeprazole (PRILOSEC) 20 MG delayed release capsule    folic acid (FOLVITE) 1 MG tablet    colestipol (COLESTID) 1 g tablet    Incontinence Supply Disposable ([facility] INCONTINENCE PADS) MISC   2. Medication refill  Multiple Vitamins-Minerals (HM COMPLETE 50+) TABS    loratadine (CLARITIN) 10 MG tablet    Cholecalciferol (VITAMIN D3) 50 MCG (2000 UT) CAPS    albuterol sulfate  (90 Base) MCG/ACT inhaler   3.  Itching  butenafine (LOTRIMIN ULTRA) 1 % CREA well nourished

## 2020-09-17 NOTE — PROGRESS NOTES
Visit Information    Have you changed or started any medications since your last visit including any over-the-counter medicines, vitamins, or herbal medicines? no   Have you stopped taking any of your medications? Is so, why? -  no  Are you having any side effects from any of your medications? - no    Have you seen any other physician or provider since your last visit?  no   Have you had any other diagnostic tests since your last visit? yes - [facility]   Have you been seen in the emergency room and/or had an admission in a hospital since we last saw you?  yes - [facility]   Have you had your routine dental cleaning in the past 6 months?  no     Do you have an active MyChart account? If no, what is the barrier?   Yes    Patient Care Team:  Omar Galvan MD as PCP - General (Emergency Medicine)    Medical History Review  Past Medical, Family, and Social History reviewed and does not contribute to the patient presenting condition    Health Maintenance   Topic Date Due    HIV screen  11/13/1987    Flu vaccine (1) 09/01/2020    Potassium monitoring  06/30/2021    Creatinine monitoring  06/30/2021    Cervical cancer screen  12/21/2021    Diabetes screen  09/16/2022    Lipid screen  09/16/2024    DTaP/Tdap/Td vaccine (2 - Td) 10/09/2029    Hepatitis A vaccine  Aged Out    Hepatitis B vaccine  Aged Out    Hib vaccine  Aged Out    Meningococcal (ACWY) vaccine  Aged Out    Pneumococcal 0-64 years Vaccine  Aged [place]

## 2020-09-17 NOTE — PROGRESS NOTES
Subjective:    Selam Cardona is a 54 y.o. female with  has a past medical history of Bipolar disorder ([address] Depression, Gastritis, Hypertension, and IBS (irritable bowel syndrome). Presented to the office today for:  Chief Complaint   Patient presents with    Medication Refill       HPI    Patient is a 53-year-old female with significant past medical history of IBS with constipation and diarrhea, multiple ileostomies and colostomies, hypertension, dizziness presented to the office today for medication refill. Patient states her blood pressure has been high at home between the 140s to 150s, however she does not regularly take her blood pressure. Patient currently on Norvasc, hydrochlorothiazide, clonidine, Cozaar. Denies any headaches, visual changes, shortness of breath, chest pain, abdominal pain. States she would like to get her blood pressure back on track since she is done with her invasive surgeries now. Review of Systems   Constitutional: Negative for activity change, appetite change, fatigue and fever. Respiratory: Negative for apnea, chest tightness and shortness of breath. Cardiovascular: Negative for chest pain, palpitations and leg swelling. Gastrointestinal: Negative for abdominal distention, abdominal pain, constipation, diarrhea, nausea and vomiting. Genitourinary: Negative for difficulty urinating, frequency and urgency. Musculoskeletal: Negative for arthralgias, back pain and neck pain. Skin: Negative for color change, pallor, rash and wound. Neurological: Negative for dizziness, facial asymmetry, light-headedness and headaches. Psychiatric/Behavioral: Negative for agitation, behavioral problems and confusion. The patient is not nervous/anxious. The patient has a No family history on file.     Objective:    BP (!) 142/73   Pulse 73   Temp 97.2 °F (36.2 °C) (Temporal)   Ht 5' 1\" (1.549 m)   Wt 205 lb (93 kg)   BMI 38.73 kg/m²    BP Readings from Last 3 Encounters:   09/17/20 (!) 150/92   09/17/20 (!) 142/73   07/01/20 132/84       Physical Exam  Constitutional:       Appearance: Normal appearance. She is obese. Cardiovascular:      Rate and Rhythm: Normal rate and regular rhythm. Pulses: Normal pulses. Heart sounds: Normal heart sounds. No friction rub. Pulmonary:      Effort: Pulmonary effort is normal.      Breath sounds: Normal breath sounds. Abdominal:      General: Abdomen is flat. Bowel sounds are normal.      Palpations: Abdomen is soft. Neurological:      General: No focal deficit present. Mental Status: She is alert and oriented to person, place, and time. Mental status is at baseline. Psychiatric:         Mood and Affect: Mood normal.         Behavior: Behavior normal.         Thought Content: Thought content normal.         Judgment: Judgment normal.         Lab Results   Component Value Date    WBC 5.8 06/29/2020    HGB 13.0 06/29/2020    HCT 42.2 06/29/2020     06/29/2020    CHOL 146 09/16/2019    TRIG 154 (H) 09/16/2019    HDL 70 09/16/2019    ALT 15 06/25/2020    AST 17 06/25/2020     06/30/2020    K 3.7 06/30/2020     06/30/2020    CREATININE 0.61 06/30/2020    BUN 4 (L) 06/30/2020    CO2 23 06/30/2020    TSH 1.03 12/21/2016    LABA1C 5.5 09/16/2019    LABMICR 11 10/26/2011     Lab Results   Component Value Date    CALCIUM 9.5 06/30/2020    PHOS 2.6 06/26/2020     Lab Results   Component Value Date    LDLCHOLESTEROL 45 09/16/2019       Assessment and Plan:    1. Medication refill  - Multiple Vitamins-Minerals (HM COMPLETE 50+) TABS; One tablet QD  Dispense: 30 tablet; Refill: 11  - loratadine (CLARITIN) 10 MG tablet; TAKE 1 TABLET BY MOUTH NIGHTLY AS NEEDED FOR SEASONAL ALLERGIES  Dispense: 30 tablet; Refill: 10  - Cholecalciferol (VITAMIN D3) 50 MCG (2000 UT) CAPS; Take 1 capsule by mouth daily  Dispense: 30 capsule;  Refill: 11  - albuterol sulfate  (90 Base) MCG/ACT inhaler; INHALE TWO (2) PUFFS BY MOUTH FOUR TIMES A DAY AS NEEDED FOR WHEEZING  Dispense: 54 g; Refill: 10    2. Irritable bowel syndrome with constipation and diarrhea  - ondansetron (ZOFRAN-ODT) 4 MG disintegrating tablet; Take 1 tablet by mouth 3 times daily as needed for Nausea or Vomiting  Dispense: 21 tablet; Refill: 0  - omeprazole (PRILOSEC) 20 MG delayed release capsule; Take 1 capsule by mouth daily (with breakfast)  Dispense: 30 capsule; Refill: 11  - folic acid (FOLVITE) 1 MG tablet; TAKE (1) TABLET BY MOUTH DAILY  Dispense: 90 tablet; Refill: 10  - colestipol (COLESTID) 1 g tablet; Take 1 tablet by mouth every morning  Dispense: 30 tablet; Refill: 10  - Incontinence Supply Disposable ([employer] INCONTINENCE PADS) MISC; 1 each by Does not apply route daily as needed (for incontinence as needed)  Dispense: 150 each; Refill: 3    3. Itching  - butenafine (LOTRIMIN ULTRA) 1 % CREA; Apply 1 Tube topically as needed (apply to affected area as needed)  Dispense: 1 Tube; Refill: 1    4. Essential hypertension  - losartan (COZAAR) 25 MG tablet; Take 1 tablet by mouth daily  Dispense: 30 tablet; Refill: 10  - hydroCHLOROthiazide (HYDRODIURIL) 25 MG tablet; TAKE (1) TABLET BY MOUTH DAILY  Dispense: 30 tablet; Refill: 10  - cloNIDine (CATAPRES) 0.3 MG tablet; TAKE ONE (1) TABLET BY MOUTH TWICE DAILY  Dispense: 60 tablet; Refill: 10  - amLODIPine (NORVASC) 2.5 MG tablet; Take 2 tablets by mouth daily  Dispense: 30 tablet; Refill: 10    5. Dizziness  - meclizine (ANTIVERT) 12.5 MG tablet; TAKE 1 TABLET BY MOUTH THREE TIMES DAILY AS NEEDED FOR DIZZINESS OR NAUSEA  Dispense: 15 tablet;  Refill: 10          Requested Prescriptions     Signed Prescriptions Disp Refills    ondansetron (ZOFRAN-ODT) 4 MG disintegrating tablet 21 tablet 0     Sig: Take 1 tablet by mouth 3 times daily as needed for Nausea or Vomiting    omeprazole (PRILOSEC) 20 MG delayed release capsule 30 capsule 11     Sig: Take 1 capsule by mouth daily (with breakfast)    Multiple Vitamins-Minerals (HM COMPLETE 50+) TABS 30 tablet 11     Sig: One tablet QD    meclizine (ANTIVERT) 12.5 MG tablet 15 tablet 10     Sig: TAKE 1 TABLET BY MOUTH THREE TIMES DAILY AS NEEDED FOR DIZZINESS OR NAUSEA    losartan (COZAAR) 25 MG tablet 30 tablet 10     Sig: Take 1 tablet by mouth daily    loratadine (CLARITIN) 10 MG tablet 30 tablet 10     Sig: TAKE 1 TABLET BY MOUTH NIGHTLY AS NEEDED FOR SEASONAL ALLERGIES    hydroCHLOROthiazide (HYDRODIURIL) 25 MG tablet 30 tablet 10     Sig: TAKE (1) TABLET BY MOUTH DAILY    folic acid (FOLVITE) 1 MG tablet 90 tablet 10     Sig: TAKE (1) TABLET BY MOUTH DAILY    colestipol (COLESTID) 1 g tablet 30 tablet 10     Sig: Take 1 tablet by mouth every morning    cloNIDine (CATAPRES) 0.3 MG tablet 60 tablet 10     Sig: TAKE ONE (1) TABLET BY MOUTH TWICE DAILY    amLODIPine (NORVASC) 2.5 MG tablet 30 tablet 10     Sig: Take 2 tablets by mouth daily    butenafine (LOTRIMIN ULTRA) 1 % CREA 1 Tube 1     Sig: Apply 1 Tube topically as needed (apply to affected area as needed)    Cholecalciferol (VITAMIN D3) 50 MCG (2000 UT) CAPS 30 capsule 11     Sig: Take 1 capsule by mouth daily    albuterol sulfate  (90 Base) MCG/ACT inhaler 54 g 10     Sig: INHALE TWO (2) PUFFS BY MOUTH FOUR TIMES A DAY AS NEEDED FOR WHEEZING    Incontinence Supply Disposable ([employer] INCONTINENCE PADS) MISC 150 each 3     Si each by Does not apply route daily as needed (for incontinence as needed)       Medications Discontinued During This Encounter   Medication Reason    ondansetron (ZOFRAN-ODT) 4 MG disintegrating tablet REORDER    omeprazole (PRILOSEC) 20 MG delayed release capsule REORDER    Multiple Vitamins-Minerals (HM COMPLETE 50+) TABS REORDER    meclizine (ANTIVERT) 12.5 MG tablet REORDER    losartan (COZAAR) 25 MG tablet REORDER    loratadine (CLARITIN) 10 MG tablet REORDER    hydrochlorothiazide (HYDRODIURIL) 25 MG tablet REORDER    folic acid (FOLVITE) 1 MG tablet REORDER    colestipol (COLESTID) 1 g tablet REORDER    cloNIDine (CATAPRES) 0.3 MG tablet REORDER    amLODIPine (NORVASC) 2.5 MG tablet REORDER    butenafine (LOTRIMIN ULTRA) 1 % CREA REORDER    Cholecalciferol (VITAMIN D3) 2000 units CAPS REORDER    albuterol sulfate  (90 Base) MCG/ACT inhaler REORDER       Marletta received counseling on the following healthy behaviors: nutrition, exercise and medication adherence    Discussed use,benefit, and side effects of prescribed medications. Barriers to medication compliance addressed. All patient questions answered. Pt voiced understanding. Return in about 2 weeks (around 10/1/2020) for blood pressure follow up . Disclaimer: Some orall of this note was transcribed using voice-recognition software. This may cause typographical errors occasionally. Although all effort is made to fix these errors, please do not hesitate to contact our office if there Jeni Phillips concern with the understanding of this note.

## 2020-09-17 NOTE — PROGRESS NOTES
[address] [address]  Dept: [phone]  Dept Fax: [phone]    Eris Montalvo is a 54 y.o. female who presents to the urgent care today for her medicalconditions/complaints as noted below. Eris Montalvo is c/o of Congestion (Onset was Sunday ); Cough (Onset was Sunday, non productive ); and Diarrhea (Onset was Sunday )      HPI:     Influenza   This is a new problem. The current episode started in the past 7 days. The problem occurs constantly. The problem has been unchanged. Associated symptoms include congestion, coughing, headaches and a sore throat. Associated symptoms comments: diarrhea. Nothing aggravates the symptoms. She has tried nothing for the symptoms. The treatment provided no relief.        Past Medical History:   Diagnosis Date    Bipolar disorder ([facility] [address]     Depression     Gastritis     Hypertension     IBS (irritable bowel syndrome)         Current Outpatient Medications   Medication Sig Dispense Refill    ondansetron (ZOFRAN-ODT) 4 MG disintegrating tablet Take 1 tablet by mouth 3 times daily as needed for Nausea or Vomiting 21 tablet 0    omeprazole (PRILOSEC) 20 MG delayed release capsule Take 1 capsule by mouth daily (with breakfast) 30 capsule 11    Multiple Vitamins-Minerals (HM COMPLETE 50+) TABS One tablet QD 30 tablet 11    meclizine (ANTIVERT) 12.5 MG tablet TAKE 1 TABLET BY MOUTH THREE TIMES DAILY AS NEEDED FOR DIZZINESS OR NAUSEA 15 tablet 10    losartan (COZAAR) 25 MG tablet Take 1 tablet by mouth daily 30 tablet 10    loratadine (CLARITIN) 10 MG tablet TAKE 1 TABLET BY MOUTH NIGHTLY AS NEEDED FOR SEASONAL ALLERGIES 30 tablet 10    hydroCHLOROthiazide (HYDRODIURIL) 25 MG tablet TAKE (1) TABLET BY MOUTH DAILY 30 tablet 10    folic acid (FOLVITE) 1 MG tablet TAKE (1) TABLET BY MOUTH DAILY 90 tablet 10    colestipol (COLESTID) 1 g tablet Take 1 tablet by mouth every morning 30 tablet 10    cloNIDine (CATAPRES) 0.3 MG tablet TAKE ONE (1) TABLET BY MOUTH TWICE DAILY 60 tablet 10    amLODIPine (NORVASC) 2.5 MG tablet Take 2 tablets by mouth daily 30 tablet 10    butenafine (LOTRIMIN ULTRA) 1 % CREA Apply 1 Tube topically as needed (apply to affected area as needed) 1 Tube 1    Cholecalciferol (VITAMIN D3) 50 MCG (2000 UT) CAPS Take 1 capsule by mouth daily 30 capsule 11    albuterol sulfate  (90 Base) MCG/ACT inhaler INHALE TWO (2) PUFFS BY MOUTH FOUR TIMES A DAY AS NEEDED FOR WHEEZING 54 g 10    Incontinence Supply Disposable ([employer] INCONTINENCE PADS) MISC 1 each by Does not apply route daily as needed (for incontinence as needed) 150 each 3    mineral oil liquid Take 30 mLs by mouth daily 900 mL 3    CLEARLAX powder MIX 17 GRAMS IN LIQUID AND DRINK BY MOUTH DAILY 1 Bottle 10    estradiol (ESTRACE) 1 MG tablet Take 1 mg by mouth daily  1    sertraline (ZOLOFT) 100 MG tablet Take 100 mg by mouth      prazosin (MINIPRESS) 2 MG capsule       traZODone (DESYREL) 100 MG tablet Take 200 mg by mouth nightly as needed for Sleep      Sodium Phosphates (FLEET) 7-19 GM/118ML Place 1 enema rectally daily as needed (constipation). 2 Bottle 1     No current facility-administered medications for this visit.       Allergies   Allergen Reactions    Motrin [Ibuprofen] Hives     Stomach cramping, sometimes her skin breaks out       Health Maintenance   Topic Date Due    HIV screen  11/13/1987    Flu vaccine (1) 09/01/2020    Potassium monitoring  06/30/2021    Creatinine monitoring  06/30/2021    Cervical cancer screen  12/21/2021    Diabetes screen  09/16/2022    Lipid screen  09/16/2024    DTaP/Tdap/Td vaccine (2 - Td) 10/09/2029    Hepatitis A vaccine  Aged Out    Hepatitis B vaccine  Aged Out    Hib vaccine  Aged Out    Meningococcal (ACWY) vaccine  Aged Out    Pneumococcal 0-64 years Vaccine  Aged Out       Subjective:      Review of Systems   HENT: Positive for congestion and sore throat. Respiratory: Positive for cough. Neurological: Positive for headaches. All other systems reviewed and are negative. Objective:     Physical Exam  Vitals signs reviewed. Constitutional:       Appearance: Normal appearance. HENT:      Head: Normocephalic and atraumatic. Skin:     General: Skin is warm and dry. Neurological:      General: No focal deficit present. Mental Status: She is alert and oriented to person, place, and time. Psychiatric:         Mood and Affect: Mood normal.         Behavior: Behavior normal.       BP (!) 150/92 (Site: Right Upper Arm, Position: Sitting, Cuff Size: Medium Adult)   Pulse 72   Temp 98 °F (36.7 °C) (Oral)   Ht 5' 1\" (1.549 m)   Wt 205 lb (93 kg)   SpO2 99%   BMI 38.73 kg/m²       Assessment:       Diagnosis Orders   1. Flu-like symptoms  COVID-19 Ambulatory       Plan:      No follow-ups on file. No orders of the defined types were placed in this encounter. Orders Placed This Encounter   Procedures    COVID-19 Ambulatory     Standing Status:   Future     Standing Expiration Date:   9/17/2021     Scheduling Instructions:      Saline media preferred given current shortage of viral transport media but both acceptable     Order Specific Question:   Is this test for diagnosis or screening? Answer:   Diagnosis of ill patient     Order Specific Question:   Symptomatic for COVID-19 as defined by CDC? Answer:   Yes     Order Specific Question:   Date of Symptom Onset     Answer:   9/10/2020     Order Specific Question:   Hospitalized for COVID-19? Answer:   No     Order Specific Question:   Admitted to ICU for COVID-19? Answer:   No     Order Specific Question:   Employed in healthcare setting? Answer:   No     Order Specific Question:   Resident in a congregate (group) care setting? Answer:   No     Order Specific Question:   Pregnant?      Answer:   No            Patient given educational materials - see patient instructions. Discussed use, benefit, and side effects of prescribed medications. All patientquestions answered. Pt voiced understanding.     Electronically signed by Arpit Guzman MD on 9/17/2020at 11:14 AM

## 2020-09-17 NOTE — TELEPHONE ENCOUNTER
Pharmacy needs clarification on medication amlodipine OTY 30, sig 2 tabs, with 10 refills, this would only be good for 15days. Please advise.  Thank you

## 2020-09-20 LAB — SARS-COV-2, NAA: NOT DETECTED

## 2020-09-21 ENCOUNTER — TELEPHONE (OUTPATIENT)
Dept: FAMILY MEDICINE CLINIC | Age: 50
End: 2020-09-21

## 2020-10-20 NOTE — TELEPHONE ENCOUNTER
Last visit: 09/17/20  Last Med refill:   Does patient have enough medication for 72 hours:     Next Visit Date:  Future Appointments   Date Time Provider Sabrina Bingham   11/3/2020  3:30 PM Karla Bolden MD [address] [place] [address] Maintenance   Topic Date Due    HIV screen  11/13/1987    Flu vaccine (1) 09/01/2020    Potassium monitoring  06/30/2021    Creatinine monitoring  06/30/2021    Cervical cancer screen  12/21/2021    Diabetes screen  09/16/2022    Lipid screen  09/16/2024    DTaP/Tdap/Td vaccine (2 - Td) 10/09/2029    Hepatitis A vaccine  Aged Out    Hepatitis B vaccine  Aged Out    Hib vaccine  Aged Out    Meningococcal (ACWY) vaccine  Aged Out    Pneumococcal 0-64 years Vaccine  Aged Out       Hemoglobin A1C (%)   Date Value   09/16/2019 5.5   12/21/2016 6.1 (H)   05/16/2014 5.6             ( goal A1C is < 7)   Microalb/Crt.  Ratio (mcg/mg creat)   Date Value   10/26/2011 11     LDL Cholesterol (mg/dL)   Date Value   09/16/2019 45   05/17/2014 93       (goal LDL is <100)   AST (U/L)   Date Value   06/25/2020 17     ALT (U/L)   Date Value   06/25/2020 15     BUN (mg/dL)   Date Value   06/30/2020 4 (L)     BP Readings from Last 3 Encounters:   09/17/20 (!) 150/92   09/17/20 (!) 142/73   07/01/20 132/84          (goal 120/80)    All Future Testing planned in [facility]  Lab Frequency Next Occurrence               Patient Active Problem List:     Major depressive disorder, recurrent episode, severe (HCC)     Bilateral lower abdominal pain     Irritable bowel syndrome with constipation and diarrhea     Uterine leiomyoma     Mirena IUD (1/24/17)     Abnormal uterine bleeding (AUB)     Small bowel obstruction (HCC)

## 2020-10-23 RX ORDER — MECLIZINE HCL 12.5 MG/1
TABLET ORAL
Qty: 15 TABLET | Refills: 10 | Status: SHIPPED | OUTPATIENT
Start: 2020-10-23 | End: 2020-12-18

## 2020-10-23 RX ORDER — CHOLECALCIFEROL (VITAMIN D3) 50 MCG
CAPSULE ORAL
Qty: 30 CAPSULE | Refills: 10 | Status: SHIPPED | OUTPATIENT
Start: 2020-10-23

## 2020-10-23 RX ORDER — AMLODIPINE BESYLATE 2.5 MG/1
TABLET ORAL
Qty: 30 TABLET | Refills: 10 | Status: SHIPPED | OUTPATIENT
Start: 2020-10-23 | End: 2021-11-22 | Stop reason: SDUPTHER

## 2020-10-23 RX ORDER — LOSARTAN POTASSIUM 25 MG/1
25 TABLET ORAL DAILY
Qty: 30 TABLET | Refills: 10 | Status: SHIPPED | OUTPATIENT
Start: 2020-10-23 | End: 2021-11-22 | Stop reason: SDUPTHER

## 2020-10-23 RX ORDER — MONTELUKAST SODIUM 4 MG/1
1 TABLET, CHEWABLE ORAL EVERY MORNING
Qty: 30 TABLET | Refills: 10 | Status: SHIPPED | OUTPATIENT
Start: 2020-10-23 | End: 2021-11-11

## 2020-11-03 ENCOUNTER — OFFICE VISIT (OUTPATIENT)
Dept: FAMILY MEDICINE CLINIC | Age: 50
End: 2020-11-03
Payer: COMMERCIAL

## 2020-11-03 VITALS
TEMPERATURE: 97.1 F | DIASTOLIC BLOOD PRESSURE: 96 MMHG | SYSTOLIC BLOOD PRESSURE: 154 MMHG | BODY MASS INDEX: 38.92 KG/M2 | WEIGHT: 206 LBS

## 2020-11-03 PROBLEM — I10 ESSENTIAL HYPERTENSION: Status: ACTIVE | Noted: 2020-11-03

## 2020-11-03 PROBLEM — G47.9 SLEEP DIFFICULTIES: Status: ACTIVE | Noted: 2020-11-03

## 2020-11-03 PROBLEM — J30.2 SEASONAL ALLERGIES: Status: ACTIVE | Noted: 2020-11-03

## 2020-11-03 PROCEDURE — 99211 OFF/OP EST MAY X REQ PHY/QHP: CPT | Performed by: FAMILY MEDICINE

## 2020-11-03 PROCEDURE — 99213 OFFICE O/P EST LOW 20 MIN: CPT | Performed by: STUDENT IN AN ORGANIZED HEALTH CARE EDUCATION/TRAINING PROGRAM

## 2020-11-03 PROCEDURE — G8427 DOCREV CUR MEDS BY ELIG CLIN: HCPCS | Performed by: STUDENT IN AN ORGANIZED HEALTH CARE EDUCATION/TRAINING PROGRAM

## 2020-11-03 PROCEDURE — G8417 CALC BMI ABV UP PARAM F/U: HCPCS | Performed by: STUDENT IN AN ORGANIZED HEALTH CARE EDUCATION/TRAINING PROGRAM

## 2020-11-03 PROCEDURE — 1036F TOBACCO NON-USER: CPT | Performed by: STUDENT IN AN ORGANIZED HEALTH CARE EDUCATION/TRAINING PROGRAM

## 2020-11-03 PROCEDURE — G8484 FLU IMMUNIZE NO ADMIN: HCPCS | Performed by: STUDENT IN AN ORGANIZED HEALTH CARE EDUCATION/TRAINING PROGRAM

## 2020-11-03 RX ORDER — LANOLIN ALCOHOL/MO/W.PET/CERES
3 CREAM (GRAM) TOPICAL DAILY
Qty: 30 TABLET | Refills: 0 | Status: ON HOLD | OUTPATIENT
Start: 2020-11-03 | End: 2022-03-14 | Stop reason: SDUPTHER

## 2020-11-03 RX ORDER — BLOOD PRESSURE TEST KIT
1 KIT MISCELLANEOUS PRN
Qty: 1 KIT | Refills: 0 | Status: SHIPPED | OUTPATIENT
Start: 2020-11-03

## 2020-11-03 RX ORDER — BLOOD PRESSURE TEST KIT
1 KIT MISCELLANEOUS PRN
Qty: 1 KIT | Refills: 0 | Status: SHIPPED | OUTPATIENT
Start: 2020-11-03 | End: 2020-11-03

## 2020-11-03 RX ORDER — PEN NEEDLE, DIABETIC 32GX 5/32"
1 NEEDLE, DISPOSABLE MISCELLANEOUS DAILY PRN
Qty: 150 EACH | Refills: 3 | Status: SHIPPED | OUTPATIENT
Start: 2020-11-03

## 2020-11-03 RX ORDER — CETIRIZINE HYDROCHLORIDE 10 MG/1
10 TABLET ORAL DAILY
Qty: 30 TABLET | Refills: 0 | Status: SHIPPED | OUTPATIENT
Start: 2020-11-03 | End: 2020-12-03

## 2020-11-03 RX ORDER — PSYLLIUM SEED (WITH DEXTROSE)
1 POWDER (GRAM) ORAL 2 TIMES DAILY
Qty: 30 PACKET | Refills: 3 | Status: SHIPPED | OUTPATIENT
Start: 2020-11-03 | End: 2020-12-03

## 2020-11-03 RX ORDER — PEN NEEDLE, DIABETIC 32GX 5/32"
1 NEEDLE, DISPOSABLE MISCELLANEOUS DAILY PRN
Qty: 150 EACH | Refills: 3 | Status: SHIPPED | OUTPATIENT
Start: 2020-11-03 | End: 2020-11-03

## 2020-11-03 ASSESSMENT — ENCOUNTER SYMPTOMS
SHORTNESS OF BREATH: 0
ABDOMINAL PAIN: 0
DIARRHEA: 1
VOMITING: 0
COUGH: 0
NAUSEA: 0
APNEA: 0
BACK PAIN: 0
CONSTIPATION: 1
ABDOMINAL DISTENTION: 0

## 2020-11-03 NOTE — PROGRESS NOTES
Attending Physician Statement  I have discussed the care of Annita Brizuela, including pertinent history and exam findings,  with the resident. I have reviewed the key elements of all parts of the encounter with the resident. I agree with the assessment, plan and orders as documented by the resident.   (GE Modifier)    Jose Hyatt

## 2020-11-03 NOTE — PROGRESS NOTES
Subjective:    Carlos Zambrano is a 54 y.o. female with  has a past medical history of Bipolar disorder ([address] Depression, Gastritis, Hypertension, and IBS (irritable bowel syndrome). Presented to the office today for:  Chief Complaint   Patient presents with    Hypertension     here for htn follow up, no issues. Needs script for new bp cuff previous cuff is broken    Medication Refill     patient states she thinks claritin is not working, having issues with itchy eyes,ears and bloody sores in nose    Back Pain     has back pain that has been bothering her, was using brace but needs new order for brace       HPI   Patient is a 53-year-old -American female seen today for follow-up of her blood pressure and IBS issues. Patient states she previously took MiraLAX for her IBS after her abdominal surgeries, however does not working anymore but Metamucil helped her before, would like a prescription for that. Patient is otherwise doing well, states she has not been checking her blood pressure at home because her blood pressure machine at home broke, would like a new one. She states that Claritin is not working well for her, she has itching in her nose and gradual ear pain that has gotten worse over the last week. States she would also like something to help her sleep. Patient denies any chest pain, shortness of breath, abdominal pain. Review of Systems   Constitutional: Negative for activity change, appetite change and fatigue. HENT: Negative for congestion. Respiratory: Negative for apnea, cough and shortness of breath. Gastrointestinal: Positive for constipation and diarrhea. Negative for abdominal distention, abdominal pain, nausea and vomiting. Genitourinary: Negative for difficulty urinating, frequency and urgency. Musculoskeletal: Negative for arthralgias, back pain and neck pain. Neurological: Positive for dizziness and light-headedness. Negative for weakness and headaches. Psychiatric/Behavioral: Negative for agitation and behavioral problems. The patient is not nervous/anxious. The patient has a No family history on file. Objective:    BP (!) 154/96   Temp 97.1 °F (36.2 °C)   Wt 206 lb (93.4 kg)   BMI 38.92 kg/m²    BP Readings from Last 3 Encounters:   11/03/20 (!) 154/96   09/17/20 (!) 150/92   09/17/20 (!) 142/73       Physical Exam  Constitutional:       Appearance: Normal appearance. She is obese. Cardiovascular:      Rate and Rhythm: Normal rate and regular rhythm. Pulses: Normal pulses. Heart sounds: Normal heart sounds. Pulmonary:      Effort: Pulmonary effort is normal.      Breath sounds: Normal breath sounds. Abdominal:      General: Abdomen is flat. Bowel sounds are normal.      Palpations: Abdomen is soft. Neurological:      General: No focal deficit present. Mental Status: She is alert and oriented to person, place, and time. Mental status is at baseline. Psychiatric:         Mood and Affect: Mood normal.         Behavior: Behavior normal.         Thought Content: Thought content normal.         Judgment: Judgment normal.       Lab Results   Component Value Date    WBC 5.8 06/29/2020    HGB 13.0 06/29/2020    HCT 42.2 06/29/2020     06/29/2020    CHOL 146 09/16/2019    TRIG 154 (H) 09/16/2019    HDL 70 09/16/2019    ALT 15 06/25/2020    AST 17 06/25/2020     06/30/2020    K 3.7 06/30/2020     06/30/2020    CREATININE 0.61 06/30/2020    BUN 4 (L) 06/30/2020    CO2 23 06/30/2020    TSH 1.03 12/21/2016    LABA1C 5.5 09/16/2019    LABMICR 11 10/26/2011     Lab Results   Component Value Date    CALCIUM 9.5 06/30/2020    PHOS 2.6 06/26/2020     Lab Results   Component Value Date    LDLCHOLESTEROL 45 09/16/2019       Assessment and Plan:    1. Irritable bowel syndrome with constipation and diarrhea  - psyllium (METAMUCIL) 28 % packet;  Take 1 packet by mouth 2 times daily Take with full glass of h20 or juice  Dispense: 30 packet; Refill: 3  - Incontinence Supply Disposable ([facility] INCONTINENCE PADS) MISC; 1 each by Does not apply route daily as needed (for incontinence as needed)  Dispense: 150 each; Refill: 3    2. Essential hypertension  - Blood Pressure KIT; 1 each by Does not apply route as needed (take as needed for blood pressure monitoring at home)  Dispense: 1 kit; Refill: 0    3. Seasonal allergies  - cetirizine (ZYRTEC) 10 MG tablet; Take 1 tablet by mouth daily Take at night, add to bubble pack  Dispense: 30 tablet; Refill: 0    4. Sleep difficulties  - melatonin (RA MELATONIN) 3 MG TABS tablet; Take 1 tablet by mouth daily  Dispense: 30 tablet; Refill: 0          Requested Prescriptions     Signed Prescriptions Disp Refills    psyllium (METAMUCIL) 28 % packet 30 packet 3     Sig: Take 1 packet by mouth 2 times daily Take with full glass of h20 or juice    Blood Pressure KIT 1 kit 0     Si each by Does not apply route as needed (take as needed for blood pressure monitoring at home)    Incontinence Supply Disposable ([facility] INCONTINENCE PADS) MISC 150 each 3     Si each by Does not apply route daily as needed (for incontinence as needed)    cetirizine (ZYRTEC) 10 MG tablet 30 tablet 0     Sig: Take 1 tablet by mouth daily Take at night, add to bubble pack    melatonin (RA MELATONIN) 3 MG TABS tablet 30 tablet 0     Sig: Take 1 tablet by mouth daily       Medications Discontinued During This Encounter   Medication Reason    Incontinence Supply Disposable ([facility] [address] [address] received counseling on the following healthy behaviors: nutrition, exercise and medication adherence    Discussed use,benefit, and side effects of prescribed medications. Barriers to medication compliance addressed. All patient questions answered. Pt voiced understanding. Return in about 2 weeks (around 2020) for blood pressure fu .         Disclaimer: Some orall of this note was transcribed using voice-recognition software. This may cause typographical errors occasionally. Although all effort is made to fix these errors, please do not hesitate to contact our office if there Sebastian Broach concern with the understanding of this note.

## 2020-11-04 ENCOUNTER — TELEPHONE (OUTPATIENT)
Dept: SURGERY | Age: 50
End: 2020-11-04

## 2020-11-04 NOTE — TELEPHONE ENCOUNTER
Order placed for patients incontinence pads, called to find out where she would like script faxed. If patient calls back.

## 2020-11-11 ENCOUNTER — TELEPHONE (OUTPATIENT)
Dept: SURGERY | Age: 50
End: 2020-11-11

## 2020-11-11 NOTE — TELEPHONE ENCOUNTER
Writer called and spoke with patient insurance company in regards to patient's order for Incontinence Pads. Office received a fax from [facility] for refill of pads, and pharmacy sent it back saying they no longer fill those items, to contact a different pharmacy. Writer then called [facility] and was told that all [employer] members must now get their medical supplies from a company in [place], so noelr called [employer] and was given [employer] [phone] (fax). All medical supplies must go through them. Faxed order and last office note to them.

## 2020-11-30 NOTE — TELEPHONE ENCOUNTER
Patient requesting 90 day supply from [facility]    Last visit:11/3/20   Last Med refill:   Does patient have enough medication for 72 hours: Yes    Next Visit Date:  No future appointments. Health Maintenance   Topic Date Due    HIV screen  11/13/1987    Flu vaccine (1) 09/01/2020    Potassium monitoring  06/30/2021    Creatinine monitoring  06/30/2021    Cervical cancer screen  12/21/2021    Diabetes screen  09/16/2022    Lipid screen  09/16/2024    DTaP/Tdap/Td vaccine (2 - Td) 10/09/2029    Hepatitis A vaccine  Aged Out    Hepatitis B vaccine  Aged Out    Hib vaccine  Aged Out    Meningococcal (ACWY) vaccine  Aged Out    Pneumococcal 0-64 years Vaccine  Aged Out       Hemoglobin A1C (%)   Date Value   09/16/2019 5.5   12/21/2016 6.1 (H)   05/16/2014 5.6             ( goal A1C is < 7)   Microalb/Crt.  Ratio (mcg/mg creat)   Date Value   10/26/2011 11     LDL Cholesterol (mg/dL)   Date Value   09/16/2019 45   05/17/2014 93       (goal LDL is <100)   AST (U/L)   Date Value   06/25/2020 17     ALT (U/L)   Date Value   06/25/2020 15     BUN (mg/dL)   Date Value   06/30/2020 4 (L)     BP Readings from Last 3 Encounters:   11/03/20 (!) 154/96   09/17/20 (!) 150/92   09/17/20 (!) 142/73          (goal 120/80)    All Future Testing planned in [facility]  Lab Frequency Next Occurrence               Patient Active Problem List:     Major depressive disorder, recurrent episode, severe (HCC)     Bilateral lower abdominal pain     Irritable bowel syndrome with constipation and diarrhea     Uterine leiomyoma     Mirena IUD (1/24/17)     Abnormal uterine bleeding (AUB)     Small bowel obstruction (HCC)     Essential hypertension     Seasonal allergies     Sleep difficulties

## 2020-12-01 RX ORDER — OMEPRAZOLE 20 MG/1
20 CAPSULE, DELAYED RELEASE ORAL
Qty: 30 CAPSULE | Refills: 3 | Status: SHIPPED | OUTPATIENT
Start: 2020-12-01 | End: 2021-09-17

## 2020-12-17 NOTE — TELEPHONE ENCOUNTER
Last visit: 11/03/20  Last Med refill: 12/01/20  Does patient have enough medication for 72 hours: Yes    Next Visit Date: None Scheduled   No future appointments. Health Maintenance   Topic Date Due    HIV screen  11/13/1987    Flu vaccine (1) 09/01/2020    Potassium monitoring  06/30/2021    Creatinine monitoring  06/30/2021    Cervical cancer screen  12/21/2021    Diabetes screen  09/16/2022    Lipid screen  09/16/2024    DTaP/Tdap/Td vaccine (2 - Td) 10/09/2029    Hepatitis A vaccine  Aged Out    Hepatitis B vaccine  Aged Out    Hib vaccine  Aged Out    Meningococcal (ACWY) vaccine  Aged Out    Pneumococcal 0-64 years Vaccine  Aged Out       Hemoglobin A1C (%)   Date Value   09/16/2019 5.5   12/21/2016 6.1 (H)   05/16/2014 5.6             ( goal A1C is < 7)   Microalb/Crt.  Ratio (mcg/mg creat)   Date Value   10/26/2011 11     LDL Cholesterol (mg/dL)   Date Value   09/16/2019 45   05/17/2014 93       (goal LDL is <100)   AST (U/L)   Date Value   06/25/2020 17     ALT (U/L)   Date Value   06/25/2020 15     BUN (mg/dL)   Date Value   06/30/2020 4 (L)     BP Readings from Last 3 Encounters:   11/03/20 (!) 154/96   09/17/20 (!) 150/92   09/17/20 (!) 142/73          (goal 120/80)    All Future Testing planned in [facility]  Lab Frequency Next Occurrence               Patient Active Problem List:     Major depressive disorder, recurrent episode, severe (HCC)     Bilateral lower abdominal pain     Irritable bowel syndrome with constipation and diarrhea     Uterine leiomyoma     Mirena IUD (1/24/17)     Abnormal uterine bleeding (AUB)     Small bowel obstruction (HCC)     Essential hypertension     Seasonal allergies     Sleep difficulties

## 2020-12-18 RX ORDER — MECLIZINE HCL 12.5 MG/1
TABLET ORAL
Qty: 90 TABLET | Refills: 10 | Status: SHIPPED | OUTPATIENT
Start: 2020-12-18 | End: 2021-12-13

## 2020-12-21 ENCOUNTER — OFFICE VISIT (OUTPATIENT)
Dept: FAMILY MEDICINE CLINIC | Age: 50
End: 2020-12-21
Payer: COMMERCIAL

## 2020-12-21 ENCOUNTER — NURSE TRIAGE (OUTPATIENT)
Dept: OTHER | Facility: CLINIC | Age: 50
End: 2020-12-21

## 2020-12-21 VITALS
BODY MASS INDEX: 37.94 KG/M2 | HEART RATE: 77 BPM | SYSTOLIC BLOOD PRESSURE: 125 MMHG | WEIGHT: 200.8 LBS | DIASTOLIC BLOOD PRESSURE: 88 MMHG

## 2020-12-21 PROCEDURE — 90686 IIV4 VACC NO PRSV 0.5 ML IM: CPT | Performed by: STUDENT IN AN ORGANIZED HEALTH CARE EDUCATION/TRAINING PROGRAM

## 2020-12-21 PROCEDURE — G8417 CALC BMI ABV UP PARAM F/U: HCPCS | Performed by: STUDENT IN AN ORGANIZED HEALTH CARE EDUCATION/TRAINING PROGRAM

## 2020-12-21 PROCEDURE — G8482 FLU IMMUNIZE ORDER/ADMIN: HCPCS | Performed by: STUDENT IN AN ORGANIZED HEALTH CARE EDUCATION/TRAINING PROGRAM

## 2020-12-21 PROCEDURE — G8427 DOCREV CUR MEDS BY ELIG CLIN: HCPCS | Performed by: STUDENT IN AN ORGANIZED HEALTH CARE EDUCATION/TRAINING PROGRAM

## 2020-12-21 PROCEDURE — 99213 OFFICE O/P EST LOW 20 MIN: CPT | Performed by: STUDENT IN AN ORGANIZED HEALTH CARE EDUCATION/TRAINING PROGRAM

## 2020-12-21 PROCEDURE — 1036F TOBACCO NON-USER: CPT | Performed by: STUDENT IN AN ORGANIZED HEALTH CARE EDUCATION/TRAINING PROGRAM

## 2020-12-21 RX ORDER — TRAZODONE HYDROCHLORIDE 100 MG/1
200 TABLET ORAL NIGHTLY PRN
OUTPATIENT
Start: 2020-12-21

## 2020-12-21 RX ORDER — MAGNESIUM CARB/ALUMINUM HYDROX 105-160MG
30 TABLET,CHEWABLE ORAL DAILY PRN
Qty: 355 ML | Refills: 0 | Status: SHIPPED | OUTPATIENT
Start: 2020-12-21 | End: 2021-02-15

## 2020-12-21 RX ORDER — LIDOCAINE 50 MG/G
1 PATCH TOPICAL DAILY PRN
Qty: 10 PATCH | Refills: 0 | Status: SHIPPED | OUTPATIENT
Start: 2020-12-21 | End: 2020-12-31

## 2020-12-21 RX ORDER — GABAPENTIN 100 MG/1
300 CAPSULE ORAL 3 TIMES DAILY
Qty: 270 CAPSULE | Refills: 0 | Status: ON HOLD | OUTPATIENT
Start: 2020-12-21 | End: 2022-03-14

## 2020-12-21 RX ORDER — LIDOCAINE 5 G/100G
1 CREAM RECTAL; TOPICAL DAILY
Qty: 1 TUBE | Refills: 0 | Status: SHIPPED | OUTPATIENT
Start: 2020-12-21 | End: 2021-03-10 | Stop reason: SDUPTHER

## 2020-12-21 RX ORDER — CYCLOBENZAPRINE HCL 10 MG
10 TABLET ORAL NIGHTLY PRN
Qty: 10 TABLET | Refills: 0 | Status: SHIPPED | OUTPATIENT
Start: 2020-12-21 | End: 2020-12-31

## 2020-12-21 NOTE — TELEPHONE ENCOUNTER
Patient called Serge Muñoz at the pre-service center [facility] with red flag complaint. Brief description of triage: Patient is calling after being evaluated in the ER last night for pain/swelling near old ileostomy site and anal bleeding. Triage indicates for patient to see today or tomorrow in office. Care advice provided, patient verbalizes understanding; denies any other questions or concerns; instructed to call back for any new or worsening symptoms. Writer provided warm transfer to [place] at [facility] for appointment scheduling. Attention Provider: Thank you for allowing me to participate in the care of your patient. The patient was connected to triage in response to information provided to the [facility]. Please do not respond through this encounter as the response is not directed to a shared pool. Reason for Disposition   Patient wants to be seen    Answer Assessment - Initial Assessment Questions  1. MAIN CONCERN OR SYMPTOM:  \"What is your main concern right now? \" \"What question do you have? \" \"What's the main symptom you're worried about? \" (e.g., breathing difficulty, cough, fever. pain)      Pain and swelling around ileostomy site    2. ONSET: \"When did the pain/swelling start? \"      Yesterday and went to ED for evaluation    3. BETTER-SAME-WORSE: \"Are you getting better, staying the same, or getting worse compared to how you felt at your last visit to the doctor (most recent medical visit)? \"      Same, got a little better after ED visit but pain/swelling is back again    4. VISIT DATE: \"When were you seen? \" (Date)      12/20/2020    5. VISIT DOCTOR: Natalia Blackwell is the name of the doctor taking care of you now? \"      ED visit    6. VISIT DIAGNOSIS:  \"What was the main symptom or problem that you were seen for?\" \"Were you given a diagnosis? \"       Pain/swelling around site, rectal bleeding    7. VISIT MEDICATIONS: \"Did the physician order any new medicines for you to use? \" If yes:  \"Have you filled the prescription and started taking the medicine? \"       No    8. NEXT APPOINTMENT: \"Have you scheduled a follow-up appointment with your doctor? \"      Calling the [facility] to schedule    9. PAIN: \"Is there any pain? \" If so, ask: \"How bad is it? \"  (Scale 1-10; or mild, moderate, severe)      Yes    10. FEVER: \"Do you have a fever? \" If so, ask: \"What is it, how was it measured  and when did it start? \"        No    11. OTHER SYMPTOMS: \"Do you have any other symptoms? \"        Rectal bleeding    Protocols used: RECENT MEDICAL VISIT FOR ILLNESS FOLLOW-UP CALL-ADULT-[place]

## 2020-12-21 NOTE — PROGRESS NOTES
Subjective:    Billie Herrera is a 52 y.o. female with  has a past medical history of Bipolar disorder ([address] Depression, Gastritis, Hypertension, and IBS (irritable bowel syndrome). Presented to the office today for:  Chief Complaint   Patient presents with    Pain     X 5 days feels pain RT side, RT arm edema went to ED was told to F/U with PCP        HPI    Patient is a 68-year-old female with history of August 2018 ex lap and Patrick's procedure secondary to diverticulitis followed by March 2019 reverse Patrick's with loop ileostomy creation followed by September 2019 reverse ileostomy. Surgeon is Dr. Bob Gillis at [address] [place] [address], those surgeries also include lysis of adhesions. He was seen at an outside ED last night for right lower abdominal pain and midsternal chest pain, on chart review it appears that an extensive work-up was completed including CT abdomen/pelvis imaging and labs including troponin that was negative. She was treated with IV fluids, Zofran, and morphine and was discharged home when she felt improved. She tells me that upon returning home last night she again had resumption of this abdominal pain. She describes as a constant but waxing and waning 8/10 \"sharp\" right lower abdominal pain that is provoked by ambulating or laying on the right side or prolonged standing and is palliated by application of her lidocaine patches. It radiates up and around her right flank. She says that this has been somewhat of a chronic issue since her abdominal surgeries in 2018/2019. Last BM was yesterday, it was a small amount which is normal and a chronic issue for her. She also reports having recent bright red blood per rectum, sometimes admixed with stool sometimes occurring without bowel movements. She says she was told last night at the ED that she had hemorrhoids. Guaiac was positive last night but she was hemodynamically stable and not anemic. Regarding the chest pain she was experiencing last night, today she states that it is still intermittently occurring. It has been occurring for 4 to 5 days, describes as 8/10 midsternal and \"sharp\" provoked by coughing and deep inspiration and gets better when she goes to sleep. Radiates straight through to her back and up to her shoulder blades and posterior neck. She states that she was prescribed Flexeril for this same chest pain in the past and requests a refill as that worked for it previously. Denies cough, hemoptysis, weight loss, shortness of breath, vomiting, diarrhea, hematuria, emesis, hematemesis, leg pain or swelling, or any other complaints at this time. Review of Systems    CONSTITUTIONAL:  no fevers, no headcahes. Positive for fatigue  EYES: negative for blury vision  HEENT: No headaches, No nasal congestion, no difficulty swallowing  RESPIRATORY:negative for dyspnea, no wheezing, no Cough  CARDIOVASCULAR: Positive for chest pain, no palpitations  GASTROINTESTINAL: Positive for nausea, no vomiting, positive for chronic constipation, positive for abdominal pain  GENITOURINARY: negative for dysuria, no hematuria   MUSCULOSKELETAL: no joint pains, no muscle aches, no swelling of joints or extremities  NEUROLOGICAL: No  Weakness or numbness    The patient has a No family history on file. Objective:    /88   Pulse 77   Wt 200 lb 12.8 oz (91.1 kg)   BMI 37.94 kg/m²    BP Readings from Last 3 Encounters:   12/21/20 125/88   11/03/20 (!) 154/96   09/17/20 (!) 150/92       Physical Exam  PHYSICAL EXAM:  General Appearance  Alert , awake , not in acute distress  HEENT - Head is normocephalic, atraumatic. Lungs - Bilateral equal air entry , no wheezes, rales or rhonchi, aeration good  Cardiovascular - Heart sounds are normal.  Regular rhythm, normal rate without murmur, gallop or rub. Abdomen - Soft, mildly tender in RLQ and in right lower back, obese, no masses or organomegaly. No CVA tenderness bilaterally, although the patient has mild tenderness below the right CVA. No peritoneal signs. Old surgical scars evident. Neurologic - There are no new focal motor or sensory deficits  Skin - No bruising or bleeding on exposed skin area  Extremities - No cyanosis, clubbing or edema      Lab Results   Component Value Date    WBC 5.8 06/29/2020    HGB 13.0 06/29/2020    HCT 42.2 06/29/2020     06/29/2020    CHOL 146 09/16/2019    TRIG 154 (H) 09/16/2019    HDL 70 09/16/2019    ALT 15 06/25/2020    AST 17 06/25/2020     06/30/2020    K 3.7 06/30/2020     06/30/2020    CREATININE 0.61 06/30/2020    BUN 4 (L) 06/30/2020    CO2 23 06/30/2020    TSH 1.03 12/21/2016    LABA1C 5.5 09/16/2019    LABMICR 11 10/26/2011     Lab Results   Component Value Date    CALCIUM 9.5 06/30/2020    PHOS 2.6 06/26/2020     Lab Results   Component Value Date    LDLCHOLESTEROL 45 09/16/2019       Assessment and Plan:      1. Lower abdominal pain  - Reviewed the exhaustive work-up performed in the ED last night; patient is afebrile today and without peritoneal signs, therefore it is safe to be treated symptomatically at home until she is able to follow-up with her surgeon for this acute on chronic abdominal pain  - External Referral To General Surgery - she will go back to see Dr. Michelle Sands who is her surgeon  - lidocaine (LIDODERM) 5 %; Place 1 patch onto the skin daily as needed for Pain 12 hours on, 12 hours off. Dispense: 10 patch; Refill: 0  - gabapentin (NEURONTIN) 100 MG capsule; Take 3 capsules by mouth 3 times daily for 30 days. Dispense: 270 capsule; Refill: 0  - cyclobenzaprine (FLEXERIL) 10 MG tablet; Take 1 tablet by mouth nightly as needed for Muscle spasms  Dispense: 10 tablet; Refill: 0    2.  Need for influenza vaccination - INFLUENZA, QUADV, 3 YRS AND OLDER, IM PF, PREFILL SYR OR SDV, 0.5ML (AFLURIA QUADV, PF)      3. Hemorrhoids, unspecified hemorrhoid type    - Lidocaine, Anorectal, (HEMORRHOIDAL RELIEF) 5 % CREA; Apply 1 Applicatorful topically daily  Dispense: 1 Tube; Refill: 0          Requested Prescriptions     Signed Prescriptions Disp Refills    Lidocaine, Anorectal, (HEMORRHOIDAL RELIEF) 5 % CREA 1 Tube 0     Sig: Apply 1 Applicatorful topically daily    lidocaine (LIDODERM) 5 % 10 patch 0     Sig: Place 1 patch onto the skin daily as needed for Pain 12 hours on, 12 hours off.  gabapentin (NEURONTIN) 100 MG capsule 270 capsule 0     Sig: Take 3 capsules by mouth 3 times daily for 30 days.  cyclobenzaprine (FLEXERIL) 10 MG tablet 10 tablet 0     Sig: Take 1 tablet by mouth nightly as needed for Muscle spasms    mineral oil liquid 355 mL 0     Sig: Take 30 mLs by mouth daily as needed for Constipation       There are no discontinued medications. Melina Bateman received counseling on the following healthy behaviors: nutrition, exercise and medication adherence    Discussed use,benefit, and side effects of prescribed medications. Barriers to medication compliance addressed. All patient questions answered. Pt voiced understanding. Return in about 4 weeks (around 1/18/2021). Disclaimer: Some orall of this note was transcribed using voice-recognition software. This may cause typographical errors occasionally. Although all effort is made to fix these errors, please do not hesitate to contact our office if there Elvis Schmitt concern with the understanding of this note.       Electronically signed by Consuelo Briones MD on 12/21/2020 at 3:45 PM

## 2020-12-21 NOTE — PROGRESS NOTES
Attending Physician Statement  I have seen and discussed the care of Shanti Telles 52 y.o. female, including pertinent history and exam findings, with the resident Dr. Ginger Damon MD.    History and Exam:   Chief Complaint   Patient presents with    Pain     X 5 days feels pain RT side, RT arm edema went to ED was told to F/U with PCP      Past Medical History:   Diagnosis Date    Bipolar disorder ([address]     Depression     Gastritis     Hypertension     IBS (irritable bowel syndrome)      Allergies   Allergen Reactions    Motrin [Ibuprofen] Hives     Stomach cramping, sometimes her skin breaks out      I have seen and examined the patient and the key elements of the encounter have been performed by me. BP Readings from Last 3 Encounters:   12/21/20 125/88   11/03/20 (!) 154/96   09/17/20 (!) 150/92     /88   Pulse 77   Wt 200 lb 12.8 oz (91.1 kg)   BMI 37.94 kg/m²   Lab Results   Component Value Date    WBC 5.8 06/29/2020    HGB 13.0 06/29/2020    HCT 42.2 06/29/2020     06/29/2020    CHOL 146 09/16/2019    TRIG 154 (H) 09/16/2019    HDL 70 09/16/2019    ALT 15 06/25/2020    AST 17 06/25/2020     06/30/2020    K 3.7 06/30/2020     06/30/2020    CREATININE 0.61 06/30/2020    BUN 4 (L) 06/30/2020    CO2 23 06/30/2020    TSH 1.03 12/21/2016    LABA1C 5.5 09/16/2019    LABMICR 11 10/26/2011     Lab Results   Component Value Date    LABPROT 7.4 10/26/2011    LABALBU 4.9 06/25/2020     No results found for: IRON, TIBC, FERRITIN  Lab Results   Component Value Date    LDLCHOLESTEROL 45 09/16/2019     I agree with the assessment, plan and the diagnosis of    Diagnosis Orders   1. Lower abdominal pain  External Referral To General Surgery    lidocaine (LIDODERM) 5 %    gabapentin (NEURONTIN) 100 MG capsule    cyclobenzaprine (FLEXERIL) 10 MG tablet   2.  Need for influenza vaccination  INFLUENZA, QUADV, 3 YRS AND OLDER, IM PF, PREFILL SYR OR SDV, 0.5ML (AFLURIA QUADV, PF) 3. Hemorrhoids, unspecified hemorrhoid type  Lidocaine, Anorectal, (HEMORRHOIDAL RELIEF) 5 % CREA    . I agree with orders as documented by the resident. More than 25 minutes spent  in face to face encounter with the patient and more than half in counseling. Patient's questions were answered. Patient Voiced understanding to the counseling. Return in about 4 weeks (around 1/18/2021).    (GC Modifier)-Dr. Graylon Goldberg, MD

## 2021-01-04 ENCOUNTER — TELEPHONE (OUTPATIENT)
Dept: FAMILY MEDICINE CLINIC | Age: 51
End: 2021-01-04

## 2021-01-04 NOTE — TELEPHONE ENCOUNTER
PA has been submitted for Liocaine (anorectal) 5%  Insurance [employer].   Waiting on response back

## 2021-01-15 ENCOUNTER — TELEPHONE (OUTPATIENT)
Dept: FAMILY MEDICINE CLINIC | Age: 51
End: 2021-01-15

## 2021-02-15 NOTE — TELEPHONE ENCOUNTER
E-scribe request for . Please review and e-scribe if applicable. Last Visit Date:    Next Visit Date:  Visit date not found    Hemoglobin A1C (%)   Date Value   09/16/2019 5.5   12/21/2016 6.1 (H)   05/16/2014 5.6             ( goal A1C is < 7)   Microalb/Crt.  Ratio (mcg/mg creat)   Date Value   10/26/2011 11     LDL Cholesterol (mg/dL)   Date Value   09/16/2019 45       (goal LDL is <100)   AST (U/L)   Date Value   06/25/2020 17     ALT (U/L)   Date Value   06/25/2020 15     BUN (mg/dL)   Date Value   06/30/2020 4 (L)     BP Readings from Last 3 Encounters:   12/21/20 125/88   11/03/20 (!) 154/96   09/17/20 (!) 150/92          (goal 120/80)        Patient Active Problem List:     Major depressive disorder, recurrent episode, severe (HCC)     Bilateral lower abdominal pain     Irritable bowel syndrome with constipation and diarrhea     Uterine leiomyoma     Mirena IUD (1/24/17)     Abnormal uterine bleeding (AUB)     Small bowel obstruction (HCC)     Essential hypertension     Seasonal allergies     Sleep difficulties      ----Titi Land

## 2021-02-17 RX ORDER — POLYETHYLENE GLYCOL 3350 17 G/17G
POWDER, FOR SOLUTION ORAL
Qty: 510 G | Refills: 2 | Status: SHIPPED | OUTPATIENT
Start: 2021-02-17 | End: 2021-05-12

## 2021-02-17 RX ORDER — MAGNESIUM CARB/ALUMINUM HYDROX 105-160MG
TABLET,CHEWABLE ORAL
Qty: 355 ML | Refills: 2 | Status: SHIPPED | OUTPATIENT
Start: 2021-02-17 | End: 2021-05-12

## 2021-03-10 DIAGNOSIS — K64.9 HEMORRHOIDS, UNSPECIFIED HEMORRHOID TYPE: ICD-10-CM

## 2021-03-10 RX ORDER — LIDOCAINE 5 G/100G
1 CREAM RECTAL; TOPICAL DAILY
Qty: 1 TUBE | Refills: 0 | Status: SHIPPED | OUTPATIENT
Start: 2021-03-10

## 2021-03-10 NOTE — TELEPHONE ENCOUNTER
Jerry Request for pending medication. Last Visit Date: 12/21/2020  Next Visit Date:  No future appointments. Health Maintenance   Topic Date Due    Hepatitis C screen  Never done    HIV screen  Never done    Potassium monitoring  06/30/2021    Creatinine monitoring  06/30/2021    Cervical cancer screen  12/21/2021    Diabetes screen  09/16/2022    Lipid screen  09/16/2024    DTaP/Tdap/Td vaccine (2 - Td) 10/09/2029    Flu vaccine  Completed    Hepatitis A vaccine  Aged Out    Hepatitis B vaccine  Aged Out    Hib vaccine  Aged Out    Meningococcal (ACWY) vaccine  Aged Out    Pneumococcal 0-64 years Vaccine  Aged Out       Hemoglobin A1C (%)   Date Value   09/16/2019 5.5   12/21/2016 6.1 (H)   05/16/2014 5.6             ( goal A1C is < 7)   Microalb/Crt. Ratio (mcg/mg creat)   Date Value   10/26/2011 11     LDL Cholesterol (mg/dL)   Date Value   09/16/2019 45       (goal LDL is <100)   AST (U/L)   Date Value   06/25/2020 17     ALT (U/L)   Date Value   06/25/2020 15     BUN (mg/dL)   Date Value   06/30/2020 4 (L)     BP Readings from Last 3 Encounters:   12/21/20 125/88   11/03/20 (!) 154/96   09/17/20 (!) 150/92          (goal 120/80)    All Future Testing planned in [facility]  Lab Frequency Next Occurrence       Next Visit Date:  No future appointments.       Patient Active Problem List:     Major depressive disorder, recurrent episode, severe (HCC)     Bilateral lower abdominal pain     Irritable bowel syndrome with constipation and diarrhea     Uterine leiomyoma     Mirena IUD (1/24/17)     Abnormal uterine bleeding (AUB)     Small bowel obstruction (HCC)     Essential hypertension     Seasonal allergies     Sleep difficulties

## 2021-03-17 RX ORDER — PSEUDOEPHED/ACETAMINOPH/DIPHEN 30MG-500MG
TABLET ORAL
Qty: 120 TABLET | Refills: 10 | Status: SHIPPED | OUTPATIENT
Start: 2021-03-17 | End: 2022-02-04

## 2021-03-17 NOTE — TELEPHONE ENCOUNTER
Tylenol pending for refill     Health Maintenance   Topic Date Due    Hepatitis C screen  Never done    HIV screen  Never done    Potassium monitoring  06/30/2021    Creatinine monitoring  06/30/2021    Cervical cancer screen  12/21/2021    Diabetes screen  09/16/2022    Lipid screen  09/16/2024    DTaP/Tdap/Td vaccine (2 - Td) 10/09/2029    Flu vaccine  Completed    Hepatitis A vaccine  Aged Out    Hepatitis B vaccine  Aged Out    Hib vaccine  Aged Out    Meningococcal (ACWY) vaccine  Aged Out    Pneumococcal 0-64 years Vaccine  Aged Out             (applicable per patient's age: Cancer Screenings, Depression Screening, Fall Risk Screening, Immunizations)    Hemoglobin A1C (%)   Date Value   09/16/2019 5.5   12/21/2016 6.1 (H)   05/16/2014 5.6     Microalb/Crt. Ratio (mcg/mg creat)   Date Value   10/26/2011 11     LDL Cholesterol (mg/dL)   Date Value   09/16/2019 45     AST (U/L)   Date Value   06/25/2020 17     ALT (U/L)   Date Value   06/25/2020 15     BUN (mg/dL)   Date Value   06/30/2020 4 (L)      (goal A1C is < 7)   (goal LDL is <100) need 30-50% reduction from baseline     BP Readings from Last 3 Encounters:   12/21/20 125/88   11/03/20 (!) 154/96   09/17/20 (!) 150/92    (goal /80)      All Future Testing planned in [facility]:  Lab Frequency Next Occurrence       Next Visit Date:  No future appointments.          Patient Active Problem List:     Major depressive disorder, recurrent episode, severe (HCC)     Bilateral lower abdominal pain     Irritable bowel syndrome with constipation and diarrhea     Uterine leiomyoma     Mirena IUD (1/24/17)     Abnormal uterine bleeding (AUB)     Small bowel obstruction (HCC)     Essential hypertension     Seasonal allergies     Sleep difficulties

## 2021-03-19 ENCOUNTER — TELEPHONE (OUTPATIENT)
Dept: FAMILY MEDICINE CLINIC | Age: 51
End: 2021-03-19

## 2021-03-19 NOTE — TELEPHONE ENCOUNTER
PA request for Lidocaine     PA processed and submitted to pt insurance, waiting for response in regards to coverage

## 2021-05-12 RX ORDER — POLYETHYLENE GLYCOL 3350 17 G/17G
POWDER, FOR SOLUTION ORAL
Qty: 510 G | Refills: 2 | Status: SHIPPED | OUTPATIENT
Start: 2021-05-12 | End: 2021-08-17

## 2021-05-12 RX ORDER — MAGNESIUM CARB/ALUMINUM HYDROX 105-160MG
TABLET,CHEWABLE ORAL
Qty: 355 ML | Refills: 2 | Status: SHIPPED | OUTPATIENT
Start: 2021-05-12 | End: 2021-08-30

## 2021-07-05 DIAGNOSIS — Z76.0 MEDICATION REFILL: ICD-10-CM

## 2021-07-06 RX ORDER — LORATADINE 10 MG/1
TABLET ORAL
Qty: 30 TABLET | Refills: 9 | Status: SHIPPED | OUTPATIENT
Start: 2021-07-06 | End: 2022-03-30

## 2021-07-06 NOTE — TELEPHONE ENCOUNTER
E-scribe request for loratadine. Please review and e-scribe if applicable. Last Visit Date: 12/21/2020  Next Visit Date:  Visit date not found    Hemoglobin A1C (%)   Date Value   09/16/2019 5.5   12/21/2016 6.1 (H)   05/16/2014 5.6             ( goal A1C is < 7)   Microalb/Crt.  Ratio (mcg/mg creat)   Date Value   10/26/2011 11     LDL Cholesterol (mg/dL)   Date Value   09/16/2019 45       (goal LDL is <100)   AST (U/L)   Date Value   06/25/2020 17     ALT (U/L)   Date Value   06/25/2020 15     BUN (mg/dL)   Date Value   06/30/2020 4 (L)     BP Readings from Last 3 Encounters:   12/21/20 125/88   11/03/20 (!) 154/96   09/17/20 (!) 150/92          (goal 120/80)        Patient Active Problem List:     Major depressive disorder, recurrent episode, severe (HCC)     Bilateral lower abdominal pain     Irritable bowel syndrome with constipation and diarrhea     Uterine leiomyoma     Mirena IUD (1/24/17)     Abnormal uterine bleeding (AUB)     Small bowel obstruction (HCC)     Essential hypertension     Seasonal allergies     Sleep difficulties

## 2021-08-17 RX ORDER — POLYETHYLENE GLYCOL 3350 17 G/17G
POWDER, FOR SOLUTION ORAL
Qty: 510 G | Refills: 2 | Status: SHIPPED | OUTPATIENT
Start: 2021-08-17 | End: 2021-11-11

## 2021-08-30 ENCOUNTER — TELEPHONE (OUTPATIENT)
Dept: FAMILY MEDICINE CLINIC | Age: 51
End: 2021-08-30

## 2021-08-30 NOTE — TELEPHONE ENCOUNTER
----- Message from Kasey Alexander sent at 8/30/2021 12:40 PM EDT -----  Subject: Message to Provider    QUESTIONS  Information for Provider? Shayna--[facility] is calling in to   clarify and get a prescription changed. Pt was prescribed a Men's   vitamin-Multiple Vitamins-Minerals (HM COMPLETE 50+) TABS and needs a new   script for women's vitamin. She can be reached at [phone]  ---------------------------------------------------------------------------  --------------  [address]  What is the best way for the office to contact you? OK to leave message on   voicemail  Preferred Call Back Phone Number? [phone]  ---------------------------------------------------------------------------  --------------  SCRIPT ANSWERS  Relationship to Patient? Third Party  Representative Name?  [id number] [address]

## 2021-10-05 ENCOUNTER — NURSE TRIAGE (OUTPATIENT)
Dept: OTHER | Facility: CLINIC | Age: 51
End: 2021-10-05

## 2021-10-05 NOTE — TELEPHONE ENCOUNTER
Received call from Carmina Roland at [facility] with Red Flag Complaint. Brief description of triage: blood in stool, dizziness. See below assessment. Triage indicates for patient to See in office today. If no appointment, walk-in clinic. Care advice provided, patient verbalizes understanding; denies any other questions or concerns; instructed to call back for any new or worsening symptoms. Writer provided warm transfer to [facility] at [facility] for appointment scheduling. Attention Provider: Thank you for allowing me to participate in the care of your patient. The patient was connected to triage in response to information provided to the [facility]/PSC. Please do not respond through this encounter as the response is not directed to a shared pool. Reason for Disposition   Rectal bleeding, bloody stool, or tarry-black stool   MODERATE rectal bleeding (small blood clots, passing blood without stool, or toilet water turns red)    Answer Assessment - Initial Assessment Questions  1. DESCRIPTION: \"Describe your dizziness. \"      \"feel like I have to take an extra step\"     2. LIGHTHEADED: \"Do you feel lightheaded? \" (e.g., somewhat faint, woozy, weak upon standing)      Yes when standing and move     3. VERTIGO: \"Do you feel like either you or the room is spinning or tilting? \" (i.e. vertigo)      Yes - room is tilting     4. SEVERITY: \"How bad is it? \"  \"Do you feel like you are going to faint? \" \"Can you stand and walk? \"    - MILD - walking normally    - MODERATE - interferes with normal activities (e.g., work, school)     - SEVERE - unable to stand, requires support to walk, feels like passing out now. Moderate     5. ONSET:  \"When did the dizziness begin? \"      A week     6. AGGRAVATING FACTORS: \"Does anything make it worse? \" (e.g., standing, change in head position)      Changing position and standing. 7. HEART RATE: \"Can you tell me your heart rate? \" \"How many beats in 15 seconds? \"  (Note: not all patients can do this)        64 bpm on carotid pulse     8. CAUSE: \"What do you think is causing the dizziness? \"      Bleeding, anxiety,     9. RECURRENT SYMPTOM: \"Have you had dizziness before? \" If so, ask: \"When was the last time? \" \"What happened that time? \"      In past was anxiety     10. OTHER SYMPTOMS: \"Do you have any other symptoms? \" (e.g., fever, chest pain, vomiting, diarrhea, bleeding)        Chest pain (right side and right shoulder with anxiety), has a colostomy and has vomiting. Rectal bleeding     11. PREGNANCY: \"Is there any chance you are pregnant? \" \"When was your last menstrual period? \"        No    Answer Assessment - Initial Assessment Questions  1. APPEARANCE of BLOOD: \"What color is it? \" \"Is it passed separately, on the surface of the stool, or mixed in with the stool? \"       Bright red blood     2. AMOUNT: \"How much blood was passed? \"       Wear a pad    3. FREQUENCY: \"How many times has blood been passed with the stools? \"       When has gas or BM    4. ONSET: \"When was the blood first seen in the stools? \" (Days or weeks)       Months ago - worsening     5. DIARRHEA: \"Is there also some diarrhea? \" If so, ask: \"How many diarrhea stools were passed in past 24 hours? \"       Diarrhea     6. CONSTIPATION: \"Do you have constipation? \" If so, \"How bad is it? \"      Intermittent, IBS     7. RECURRENT SYMPTOMS: \"Have you had blood in your stools before? \" If so, ask: \"When was the last time? \" and \"What happened that time? \"       Yes - diverticulitis     8. BLOOD THINNERS: \"Do you take any blood thinners? \" (e.g., Coumadin/warfarin, Pradaxa/dabigatran, aspirin)      Denies    Protocols used: DIZZINESS-ADULT-OH, RECTAL BLEEDING-ADULT-OH

## 2021-10-15 DIAGNOSIS — I10 ESSENTIAL HYPERTENSION: ICD-10-CM

## 2021-10-15 RX ORDER — CLONIDINE HYDROCHLORIDE 0.3 MG/1
TABLET ORAL
Qty: 60 TABLET | Refills: 0 | Status: SHIPPED | OUTPATIENT
Start: 2021-10-15 | End: 2021-11-11

## 2021-10-15 NOTE — TELEPHONE ENCOUNTER
E-scribe request for clonidine. Please review and e-scribe if applicable. Last Visit Date:  08/30/2021  Next Visit Date:  Visit date not found    Hemoglobin A1C (%)   Date Value   09/16/2019 5.5   12/21/2016 6.1 (H)   05/16/2014 5.6             ( goal A1C is < 7)   Microalb/Crt.  Ratio (mcg/mg creat)   Date Value   10/26/2011 11     LDL Cholesterol (mg/dL)   Date Value   09/16/2019 45       (goal LDL is <100)   AST (U/L)   Date Value   06/25/2020 17     ALT (U/L)   Date Value   06/25/2020 15     BUN (mg/dL)   Date Value   06/30/2020 4 (L)     BP Readings from Last 3 Encounters:   12/21/20 125/88   11/03/20 (!) 154/96   09/17/20 (!) 150/92          (goal 120/80)        Patient Active Problem List:     Major depressive disorder, recurrent episode, severe (HCC)     Bilateral lower abdominal pain     Irritable bowel syndrome with constipation and diarrhea     Uterine leiomyoma     Mirena IUD (1/24/17)     Abnormal uterine bleeding (AUB)     Small bowel obstruction (HCC)     Essential hypertension     Seasonal allergies     Sleep difficulties      ----Carlos Echevarria

## 2021-11-11 DIAGNOSIS — K58.2 IRRITABLE BOWEL SYNDROME WITH CONSTIPATION AND DIARRHEA: ICD-10-CM

## 2021-11-11 DIAGNOSIS — I10 ESSENTIAL HYPERTENSION: ICD-10-CM

## 2021-11-11 RX ORDER — MONTELUKAST SODIUM 4 MG/1
1 TABLET, CHEWABLE ORAL EVERY MORNING
Qty: 30 TABLET | Refills: 1 | Status: SHIPPED | OUTPATIENT
Start: 2021-11-11 | End: 2022-01-10

## 2021-11-11 RX ORDER — POLYETHYLENE GLYCOL 3350 17 G/17G
POWDER, FOR SOLUTION ORAL
Qty: 510 G | Refills: 2 | Status: SHIPPED | OUTPATIENT
Start: 2021-11-11 | End: 2022-02-04

## 2021-11-11 NOTE — TELEPHONE ENCOUNTER
Patient is scheduled 11/22/2021      Please address the medication refill and close the encounter. If I can be of assistance, please route to the applicable pool. Thank you. Last visit: 12/21/2020  Last Med refill: 8/17/2021  Does patient have enough medication for 72 hours: No:     Next Visit Date:  No future appointments. Health Maintenance   Topic Date Due    Hepatitis C screen  Never done    COVID-19 Vaccine (1) Never done    HIV screen  Never done    Colon cancer screen colonoscopy  Never done    Potassium monitoring  06/30/2021    Creatinine monitoring  06/30/2021    Flu vaccine (1) 09/01/2021    Cervical cancer screen  12/21/2021    Lipid screen  09/16/2024    DTaP/Tdap/Td vaccine (2 - Td or Tdap) 10/09/2029    Hepatitis A vaccine  Aged Out    Hepatitis B vaccine  Aged Out    Hib vaccine  Aged Out    Meningococcal (ACWY) vaccine  Aged Out    Pneumococcal 0-64 years Vaccine  Aged Out       Hemoglobin A1C (%)   Date Value   09/16/2019 5.5   12/21/2016 6.1 (H)   05/16/2014 5.6             ( goal A1C is < 7)   Microalb/Crt.  Ratio (mcg/mg creat)   Date Value   10/26/2011 11     LDL Cholesterol (mg/dL)   Date Value   09/16/2019 45   05/17/2014 93       (goal LDL is <100)   AST (U/L)   Date Value   06/25/2020 17     ALT (U/L)   Date Value   06/25/2020 15     BUN (mg/dL)   Date Value   06/30/2020 4 (L)     BP Readings from Last 3 Encounters:   12/21/20 125/88   11/03/20 (!) 154/96   09/17/20 (!) 150/92          (goal 120/80)    All Future Testing planned in [facility]  Lab Frequency Next Occurrence               Patient Active Problem List:     Major depressive disorder, recurrent episode, severe (HCC)     Bilateral lower abdominal pain     Irritable bowel syndrome with constipation and diarrhea     Uterine leiomyoma     Mirena IUD (1/24/17)     Abnormal uterine bleeding (AUB)     Small bowel obstruction (HCC)     Essential hypertension     Seasonal allergies     Sleep difficulties

## 2021-11-11 NOTE — TELEPHONE ENCOUNTER
Please address the medication refill and close the encounter. If I can be of assistance, please route to the applicable pool. Thank you. Last visit: 12/21/2020  Last Med refill: 10/15/2021  Does patient have enough medication for 72 hours: No:     Next Visit Date:  Future Appointments   Date Time Provider Sabrina Bingham   11/22/2021 10:15 AM Dylan Philippe MD [address] [place] [address] Maintenance   Topic Date Due    Hepatitis C screen  Never done    COVID-19 Vaccine (1) Never done    HIV screen  Never done    Colon cancer screen colonoscopy  Never done    Potassium monitoring  06/30/2021    Creatinine monitoring  06/30/2021    Flu vaccine (1) 09/01/2021    Cervical cancer screen  12/21/2021    Lipid screen  09/16/2024    DTaP/Tdap/Td vaccine (2 - Td or Tdap) 10/09/2029    Hepatitis A vaccine  Aged Out    Hepatitis B vaccine  Aged Out    Hib vaccine  Aged Out    Meningococcal (ACWY) vaccine  Aged Out    Pneumococcal 0-64 years Vaccine  Aged Out       Hemoglobin A1C (%)   Date Value   09/16/2019 5.5   12/21/2016 6.1 (H)   05/16/2014 5.6             ( goal A1C is < 7)   Microalb/Crt.  Ratio (mcg/mg creat)   Date Value   10/26/2011 11     LDL Cholesterol (mg/dL)   Date Value   09/16/2019 45   05/17/2014 93       (goal LDL is <100)   AST (U/L)   Date Value   06/25/2020 17     ALT (U/L)   Date Value   06/25/2020 15     BUN (mg/dL)   Date Value   06/30/2020 4 (L)     BP Readings from Last 3 Encounters:   12/21/20 125/88   11/03/20 (!) 154/96   09/17/20 (!) 150/92          (goal 120/80)    All Future Testing planned in [facility]  Lab Frequency Next Occurrence               Patient Active Problem List:     Major depressive disorder, recurrent episode, severe (HCC)     Bilateral lower abdominal pain     Irritable bowel syndrome with constipation and diarrhea     Uterine leiomyoma     Mirena IUD (1/24/17)     Abnormal uterine bleeding (AUB)     Small bowel obstruction ([address]     Essential hypertension     Seasonal allergies     Sleep difficulties

## 2021-11-11 NOTE — TELEPHONE ENCOUNTER
LVM for patient to contact office to scheduled appointment. Please address the medication refill and close the encounter. If I can be of assistance, please route to the applicable pool. Thank you. Last visit: 12-21-20  Last Med refill: 10/23/2020  Does patient have enough medication for 72 hours: No:     Next Visit Date:  No future appointments. Health Maintenance   Topic Date Due    Hepatitis C screen  Never done    COVID-19 Vaccine (1) Never done    HIV screen  Never done    Colon cancer screen colonoscopy  Never done    Potassium monitoring  06/30/2021    Creatinine monitoring  06/30/2021    Flu vaccine (1) 09/01/2021    Cervical cancer screen  12/21/2021    Lipid screen  09/16/2024    DTaP/Tdap/Td vaccine (2 - Td or Tdap) 10/09/2029    Hepatitis A vaccine  Aged Out    Hepatitis B vaccine  Aged Out    Hib vaccine  Aged Out    Meningococcal (ACWY) vaccine  Aged Out    Pneumococcal 0-64 years Vaccine  Aged Out       Hemoglobin A1C (%)   Date Value   09/16/2019 5.5   12/21/2016 6.1 (H)   05/16/2014 5.6             ( goal A1C is < 7)   Microalb/Crt.  Ratio (mcg/mg creat)   Date Value   10/26/2011 11     LDL Cholesterol (mg/dL)   Date Value   09/16/2019 45   05/17/2014 93       (goal LDL is <100)   AST (U/L)   Date Value   06/25/2020 17     ALT (U/L)   Date Value   06/25/2020 15     BUN (mg/dL)   Date Value   06/30/2020 4 (L)     BP Readings from Last 3 Encounters:   12/21/20 125/88   11/03/20 (!) 154/96   09/17/20 (!) 150/92          (goal 120/80)    All Future Testing planned in [facility]  Lab Frequency Next Occurrence               Patient Active Problem List:     Major depressive disorder, recurrent episode, severe (HCC)     Bilateral lower abdominal pain     Irritable bowel syndrome with constipation and diarrhea     Uterine leiomyoma     Mirena IUD (1/24/17)     Abnormal uterine bleeding (AUB)     Small bowel obstruction (HCC)     Essential hypertension     Seasonal allergies     Sleep difficulties

## 2021-11-12 RX ORDER — CLONIDINE HYDROCHLORIDE 0.3 MG/1
TABLET ORAL
Qty: 60 TABLET | Refills: 0 | Status: SHIPPED | OUTPATIENT
Start: 2021-11-12 | End: 2021-11-22 | Stop reason: SDUPTHER

## 2021-11-22 ENCOUNTER — OFFICE VISIT (OUTPATIENT)
Dept: FAMILY MEDICINE CLINIC | Age: 51
End: 2021-11-22
Payer: COMMERCIAL

## 2021-11-22 VITALS
DIASTOLIC BLOOD PRESSURE: 94 MMHG | HEART RATE: 72 BPM | SYSTOLIC BLOOD PRESSURE: 148 MMHG | BODY MASS INDEX: 36.17 KG/M2 | HEIGHT: 61 IN | WEIGHT: 191.6 LBS | TEMPERATURE: 94.1 F

## 2021-11-22 DIAGNOSIS — F31.9 BIPOLAR 1 DISORDER (HCC): ICD-10-CM

## 2021-11-22 DIAGNOSIS — Z11.4 ENCOUNTER FOR SCREENING FOR HIV: ICD-10-CM

## 2021-11-22 DIAGNOSIS — G56.03 BILATERAL CARPAL TUNNEL SYNDROME: ICD-10-CM

## 2021-11-22 DIAGNOSIS — Z11.59 NEED FOR HEPATITIS C SCREENING TEST: ICD-10-CM

## 2021-11-22 DIAGNOSIS — I10 ESSENTIAL HYPERTENSION: Primary | ICD-10-CM

## 2021-11-22 PROCEDURE — G8427 DOCREV CUR MEDS BY ELIG CLIN: HCPCS | Performed by: STUDENT IN AN ORGANIZED HEALTH CARE EDUCATION/TRAINING PROGRAM

## 2021-11-22 PROCEDURE — G8482 FLU IMMUNIZE ORDER/ADMIN: HCPCS | Performed by: STUDENT IN AN ORGANIZED HEALTH CARE EDUCATION/TRAINING PROGRAM

## 2021-11-22 PROCEDURE — G8417 CALC BMI ABV UP PARAM F/U: HCPCS | Performed by: STUDENT IN AN ORGANIZED HEALTH CARE EDUCATION/TRAINING PROGRAM

## 2021-11-22 PROCEDURE — 90686 IIV4 VACC NO PRSV 0.5 ML IM: CPT | Performed by: STUDENT IN AN ORGANIZED HEALTH CARE EDUCATION/TRAINING PROGRAM

## 2021-11-22 PROCEDURE — 99213 OFFICE O/P EST LOW 20 MIN: CPT | Performed by: STUDENT IN AN ORGANIZED HEALTH CARE EDUCATION/TRAINING PROGRAM

## 2021-11-22 PROCEDURE — 1036F TOBACCO NON-USER: CPT | Performed by: STUDENT IN AN ORGANIZED HEALTH CARE EDUCATION/TRAINING PROGRAM

## 2021-11-22 RX ORDER — AMLODIPINE BESYLATE 5 MG/1
10 TABLET ORAL DAILY
Qty: 30 TABLET | Refills: 5 | Status: SHIPPED | OUTPATIENT
Start: 2021-11-22 | End: 2022-04-15

## 2021-11-22 RX ORDER — HYDROCHLOROTHIAZIDE 25 MG/1
TABLET ORAL
Qty: 30 TABLET | Refills: 5 | Status: SHIPPED | OUTPATIENT
Start: 2021-11-22 | End: 2022-04-22 | Stop reason: SDUPTHER

## 2021-11-22 RX ORDER — LIDOCAINE 4 G/G
1 PATCH TOPICAL DAILY
Qty: 30 PATCH | Refills: 0 | Status: SHIPPED | OUTPATIENT
Start: 2021-11-22 | End: 2021-12-22

## 2021-11-22 RX ORDER — MULTIVIT-MIN/IRON FUM/FOLIC AC 7.5 MG-4
1 TABLET ORAL DAILY
Qty: 90 TABLET | Refills: 3 | Status: SHIPPED | OUTPATIENT
Start: 2021-11-22

## 2021-11-22 RX ORDER — LOSARTAN POTASSIUM 25 MG/1
25 TABLET ORAL DAILY
Qty: 30 TABLET | Refills: 10 | Status: SHIPPED | OUTPATIENT
Start: 2021-11-22 | End: 2022-04-22 | Stop reason: SDUPTHER

## 2021-11-22 RX ORDER — AMLODIPINE BESYLATE 2.5 MG/1
5 TABLET ORAL DAILY
Qty: 30 TABLET | Refills: 10 | Status: SHIPPED | OUTPATIENT
Start: 2021-11-22 | End: 2021-11-22

## 2021-11-22 RX ORDER — CLONIDINE HYDROCHLORIDE 0.3 MG/1
TABLET ORAL
Qty: 60 TABLET | Refills: 0 | Status: SHIPPED | OUTPATIENT
Start: 2021-11-22 | End: 2022-01-10

## 2021-11-22 ASSESSMENT — ENCOUNTER SYMPTOMS
NAUSEA: 0
DIARRHEA: 0
CONSTIPATION: 0
COUGH: 0
CHEST TIGHTNESS: 0
BACK PAIN: 0
SHORTNESS OF BREATH: 0
COLOR CHANGE: 0
VOMITING: 0
ABDOMINAL PAIN: 0

## 2021-11-22 NOTE — PROGRESS NOTES
Diabetic visit information    BP Readings from Last 3 Encounters:   12/21/20 125/88   11/03/20 (!) 154/96   09/17/20 (!) 150/92       Hemoglobin A1C (%)   Date Value   09/16/2019 5.5   12/21/2016 6.1 (H)   05/16/2014 5.6     Microalb/Crt. Ratio (mcg/mg creat)   Date Value   10/26/2011 11     LDL Cholesterol (mg/dL)   Date Value   09/16/2019 45               Have you changed or started any medications since your last visit including any over-the-counter medicines, vitamins, or herbal medicines? no   Have you stopped taking any of your medications? Is so, why? -  no  Are you having any side effects from any of your medications? - no    Have you seen any other physician or provider since your last visit?  no  Have you had any other diagnostic tests since your last visit? no   Have you been seen in the emergency room and/or had an admission in a hospital since we last saw you?  no     Have you had your annual diabetic retinal (eye) exam? No   (ensure copy of exam is in the chart)    Have you had your routine dental cleaning in the past 6 months? no    Do you have an active MyChart account? If not, what are your barriers? No: declines    Patient Care Team:  Ike Patiño MD as PCP - General (Emergency Medicine)    Medical history Review  Past Medical, Family, and Social History reviewed and does not contribute to the patient presenting condition.     Health Maintenance   Topic Date Due    Hepatitis C screen  Never done    HIV screen  Never done    Colon cancer screen colonoscopy  Never done    Potassium monitoring  06/30/2021    Creatinine monitoring  06/30/2021    COVID-19 Vaccine (2 - Booster for Jo-Ann series) 07/14/2021    Flu vaccine (1) 09/01/2021    Cervical cancer screen  12/21/2021    Lipid screen  09/16/2024    DTaP/Tdap/Td vaccine (2 - Td or Tdap) 10/09/2029    Hepatitis A vaccine  Aged Out    Hepatitis B vaccine  Aged Out    Hib vaccine  Aged Out    Meningococcal (ACWY) vaccine  Aged Out    Pneumococcal 0-64 years Vaccine  Aged Out

## 2021-11-22 NOTE — PROGRESS NOTES
Subjective:    Que Ferrer is a 54 y.o. female with  has a past medical history of Bipolar disorder ([address] Depression, Gastritis, Hypertension, and IBS (irritable bowel syndrome). Presented to the office today for:  Chief Complaint   Patient presents with    Hypertension     folllow up    Hand Pain     bothhand right is worst    Medication Refill     refill        HPI    54 Y O F with PMHx of HTN, depression and velasquez's surgery who presents for hypertension follow up. BP is148/94, on clonidine 0.3 BID, amlodipine 5mg, losartan 25mg QD and HCTZ  25mg. SBP at home around 170. Pain in bilateral hands mostly right. She reports muscle spams that she uses flexeril that helps. She used to get her psych meds through [facility] but she does not want to follow up there any more. She was on zoloft and deseryl that she stopped taking for 2 months. Review of Systems   Constitutional: Negative for activity change, appetite change, chills, fatigue and fever. HENT: Negative. Respiratory: Negative for cough, chest tightness and shortness of breath. Cardiovascular: Negative for chest pain, palpitations and leg swelling. Gastrointestinal: Negative for abdominal pain, constipation, diarrhea, nausea and vomiting. Genitourinary: Negative for decreased urine volume, difficulty urinating, dysuria, frequency and hematuria. Musculoskeletal: Negative for arthralgias, back pain and neck pain. Skin: Negative for color change. Neurological: Negative for dizziness, weakness, light-headedness, numbness and headaches. The patient has a History reviewed. No pertinent family history.     Objective:    BP (!) 148/94 (Site: Right Upper Arm, Position: Sitting, Cuff Size: Large Adult)   Pulse 72   Temp 94.1 °F (34.5 °C) (Temporal)   Ht 5' 0.98\" (1.549 m)   Wt 191 lb 9.6 oz (86.9 kg)   BMI 36.22 kg/m²    BP Readings from Last 3 Encounters:   11/22/21 (!) 148/94   12/21/20 125/88   11/03/20 (!) 154/96 Physical Exam  Vitals and nursing note reviewed. Constitutional:       General: She is not in acute distress. Appearance: Normal appearance. She is not ill-appearing. HENT:      Head: Normocephalic and atraumatic. Mouth/Throat:      Pharynx: Oropharynx is clear. Cardiovascular:      Rate and Rhythm: Normal rate and regular rhythm. Pulses: Normal pulses. Heart sounds: No murmur heard. Pulmonary:      Effort: Pulmonary effort is normal.      Breath sounds: Normal breath sounds. Abdominal:      Palpations: Abdomen is soft. There is no mass. Tenderness: There is no abdominal tenderness. Musculoskeletal:         General: No swelling or tenderness. Normal range of motion. Cervical back: Normal range of motion and neck supple. Neurological:      General: No focal deficit present. Mental Status: She is alert and oriented to person, place, and time. Lab Results   Component Value Date    WBC 5.8 06/29/2020    HGB 13.0 06/29/2020    HCT 42.2 06/29/2020     06/29/2020    CHOL 146 09/16/2019    TRIG 154 (H) 09/16/2019    HDL 70 09/16/2019    ALT 15 06/25/2020    AST 17 06/25/2020     06/30/2020    K 3.7 06/30/2020     06/30/2020    CREATININE 0.61 06/30/2020    BUN 4 (L) 06/30/2020    CO2 23 06/30/2020    TSH 1.03 12/21/2016    LABA1C 5.5 09/16/2019    LABMICR 11 10/26/2011     Lab Results   Component Value Date    CALCIUM 9.5 06/30/2020    PHOS 2.6 06/26/2020     Lab Results   Component Value Date    LDLCHOLESTEROL 45 09/16/2019       Assessment and Plan:    1. Essential hypertension  BP is148/94, on clonidine 0.3 BID, amlodipine 5mg, losartan 25mg QD and HCTZ  25mg. SBP at home around 170. We will increase amlodipine to 10 mg daily. Counseled patient about compliance, DASH diet and exercise. - cloNIDine (CATAPRES) 0.3 MG tablet; TAKE ONE (1) TABLET BY MOUTH TWICE DAILY  Dispense: 60 tablet;  Refill: 0  - hydroCHLOROthiazide (HYDRODIURIL) 25 MG tablet; TAKE 1 TABLET BY MOUTH DAILY  Dispense: 30 tablet; Refill: 5  - losartan (COZAAR) 25 MG tablet; Take 1 tablet by mouth daily  Dispense: 30 tablet; Refill: 10  - Basic Metabolic Panel; Future  - amLODIPine (NORVASC) 5 MG tablet; Take 2 tablets by mouth daily  Dispense: 30 tablet; Refill: 5    2. Bilateral carpal tunnel syndrome  Worsening carpal tunnel pains in both hands more on the right side. Patient had tried wrist brace long time ago and she thinks that it helped that time, she is willing to try it again.    - Pro[employer] Comfort Form Wrist Brace  - lidocaine 4 % external patch; Place 1 patch onto the skin daily  Dispense: 30 patch; Refill: 0    3. Bipolar 1 disorder ([facility]    - Nelly Aase, MD, Psychiatry, [place]    4. Encounter for screening for HIV    - HIV Screen; Future    5. Need for hepatitis C screening test    - Hepatitis C Antibody;  Future        Requested Prescriptions     Signed Prescriptions Disp Refills    cloNIDine (CATAPRES) 0.3 MG tablet 60 tablet 0     Sig: TAKE ONE (1) TABLET BY MOUTH TWICE DAILY    hydroCHLOROthiazide (HYDRODIURIL) 25 MG tablet 30 tablet 5     Sig: TAKE 1 TABLET BY MOUTH DAILY    losartan (COZAAR) 25 MG tablet 30 tablet 10     Sig: Take 1 tablet by mouth daily    diclofenac sodium (VOLTAREN) 1 % GEL 1 g 2     Sig: Apply 2 g topically 4 times daily    lidocaine 4 % external patch 30 patch 0     Sig: Place 1 patch onto the skin daily    Multiple Vitamins-Minerals (MULTIVITAMIN WITH MINERALS) tablet 90 tablet 3     Sig: Take 1 tablet by mouth daily    amLODIPine (NORVASC) 5 MG tablet 30 tablet 5     Sig: Take 2 tablets by mouth daily       Medications Discontinued During This Encounter   Medication Reason    amLODIPine (NORVASC) 2.5 MG tablet REORDER    losartan (COZAAR) 25 MG tablet REORDER    hydroCHLOROthiazide (HYDRODIURIL) 25 MG tablet REORDER    cloNIDine (CATAPRES) 0.3 MG tablet REORDER    amLODIPine (NORVASC) 2.5 MG tablet LIST CLEANUP    Multiple Vitamins-Minerals (HM COMPLETE 50+) TABS LIST CLEANUP    amLODIPine (NORVASC) 5 MG tablet        Marletta received counseling on the following healthy behaviors: nutrition, exercise and medication adherence    Discussed use,benefit, and side effects of prescribed medications. Barriers to medication compliance addressed. All patient questions answered. Pt voiced understanding. Return in about 3 months (around 2/22/2022) for HTN. Disclaimer: Some orall of this note was transcribed using voice-recognition software. This may cause typographical errors occasionally. Although all effort is made to fix these errors, please do not hesitate to contact our office if there Theresa Walsh concern with the understanding of this note.

## 2021-11-22 NOTE — PATIENT INSTRUCTIONS
Thank you for letting us take care of you today. We hope all your questions were addressed. If a question was overlooked or something else comes to mind after you return home, please contact a member of your Care Team listed below. Your Care Team at [place] [address] is Team #1  Ashley Mckeon MD (Faculty)  Josie Restrepo MD(Resident)  Kaleb Prather MD (Resident)  Julian Wang., [facility] Mary Saucedo., [facility] office)  Mary Boyce, [address] (Clinical Practice Manager)  Marry Palacios [facility] (Clinical Pharmacist)     Office phone number: [phone]    If you need to get in right away due to illness, please be advised we have \"Same Day\" appointments available Monday-Friday. Please call us at [phone] option #3 to schedule your \"Same Day\" appointment.

## 2021-11-22 NOTE — PROGRESS NOTES
Attending Physician Statement  I have discussed the care of Connor Damon 54 y.o. female, including pertinent history and exam findings, with the resident Dr. Rosa Harper MD.    History and Exam:   Chief Complaint   Patient presents with    Hypertension     folllow up    Hand Pain     bothhand right is worst    Medication Refill     refill        Past Medical History:   Diagnosis Date    [facility]     Depression     Gastritis     Hypertension     IBS (irritable bowel syndrome)      Allergies   Allergen Reactions    Motrin [Ibuprofen] Hives     Stomach cramping, sometimes her skin breaks out      I have seen and examined the patient and the key elements of the encounter have been performed by me. BP Readings from Last 3 Encounters:   11/22/21 (!) 148/94   12/21/20 125/88   11/03/20 (!) 154/96     BP (!) 148/94 (Site: Right Upper Arm, Position: Sitting, Cuff Size: Large Adult)   Pulse 72   Temp 94.1 °F (34.5 °C) (Temporal)   Ht 5' 0.98\" (1.549 m)   Wt 191 lb 9.6 oz (86.9 kg)   BMI 36.22 kg/m²   Lab Results   Component Value Date    WBC 5.8 06/29/2020    HGB 13.0 06/29/2020    HCT 42.2 06/29/2020     06/29/2020    CHOL 146 09/16/2019    TRIG 154 (H) 09/16/2019    HDL 70 09/16/2019    ALT 15 06/25/2020    AST 17 06/25/2020     06/30/2020    K 3.7 06/30/2020     06/30/2020    CREATININE 0.61 06/30/2020    BUN 4 (L) 06/30/2020    CO2 23 06/30/2020    TSH 1.03 12/21/2016    LABA1C 5.5 09/16/2019    LABMICR 11 10/26/2011     Lab Results   Component Value Date    LABPROT 7.4 10/26/2011    LABALBU 4.9 06/25/2020     No results found for: IRON, TIBC, FERRITIN  Lab Results   Component Value Date    LDLCHOLESTEROL 45 09/16/2019     I agree with the assessment, plan and the diagnosis of    Diagnosis Orders   1.  Essential hypertension  cloNIDine (CATAPRES) 0.3 MG tablet    hydroCHLOROthiazide (HYDRODIURIL) 25 MG tablet    losartan (COZAAR) 25 MG tablet    Basic Metabolic Panel

## 2021-11-30 DIAGNOSIS — R42 DIZZINESS: ICD-10-CM

## 2021-11-30 NOTE — TELEPHONE ENCOUNTER
E-scribe request for meclizine. Please review and e-scribe if applicable. Last Visit Date:  11/22/2021  Next Visit Date:  Visit date not found    Hemoglobin A1C (%)   Date Value   09/16/2019 5.5   12/21/2016 6.1 (H)   05/16/2014 5.6             ( goal A1C is < 7)   Microalb/Crt.  Ratio (mcg/mg creat)   Date Value   10/26/2011 11     LDL Cholesterol (mg/dL)   Date Value   09/16/2019 45       (goal LDL is <100)   AST (U/L)   Date Value   06/25/2020 17     ALT (U/L)   Date Value   06/25/2020 15     BUN (mg/dL)   Date Value   06/30/2020 4 (L)     BP Readings from Last 3 Encounters:   11/22/21 (!) 148/94   12/21/20 125/88   11/03/20 (!) 154/96          (goal 120/80)        Patient Active Problem List:     Major depressive disorder, recurrent episode, severe (HCC)     Bilateral lower abdominal pain     Irritable bowel syndrome with constipation and diarrhea     Uterine leiomyoma     Mirena IUD (1/24/17)     Abnormal uterine bleeding (AUB)     Small bowel obstruction (HCC)     Essential hypertension     Seasonal allergies     Sleep difficulties     Bilateral carpal tunnel syndrome     B[facility] ([facility]      ----Lori Schmidt

## 2021-12-01 RX ORDER — MAGNESIUM CARB/ALUMINUM HYDROX 105-160MG
TABLET,CHEWABLE ORAL
Qty: 355 ML | Refills: 10 | Status: SHIPPED | OUTPATIENT
Start: 2021-12-01

## 2021-12-01 NOTE — TELEPHONE ENCOUNTER
Please address the medication refill and close the encounter. If I can be of assistance, please route to the applicable pool. Thank you. Last visit: 11/22/21  Last Med refill: 11/24/21  Does patient have enough medication for 72 hours: No:     Next Visit Date:  No future appointments. Health Maintenance   Topic Date Due    Hepatitis C screen  Never done    HIV screen  Never done    Colon cancer screen colonoscopy  Never done    Potassium monitoring  06/30/2021    Creatinine monitoring  06/30/2021    COVID-19 Vaccine (2 - Booster for Jo-Ann series) 07/14/2021    Cervical cancer screen  12/21/2021    Diabetes screen  09/16/2022    Lipid screen  09/16/2024    DTaP/Tdap/Td vaccine (2 - Td or Tdap) 10/09/2029    Flu vaccine  Completed    Hepatitis A vaccine  Aged Out    Hepatitis B vaccine  Aged Out    Hib vaccine  Aged Out    Meningococcal (ACWY) vaccine  Aged Out    Pneumococcal 0-64 years Vaccine  Aged Out       Hemoglobin A1C (%)   Date Value   09/16/2019 5.5   12/21/2016 6.1 (H)   05/16/2014 5.6             ( goal A1C is < 7)   Microalb/Crt.  Ratio (mcg/mg creat)   Date Value   10/26/2011 11     LDL Cholesterol (mg/dL)   Date Value   09/16/2019 45   05/17/2014 93       (goal LDL is <100)   AST (U/L)   Date Value   06/25/2020 17     ALT (U/L)   Date Value   06/25/2020 15     BUN (mg/dL)   Date Value   06/30/2020 4 (L)     BP Readings from Last 3 Encounters:   11/22/21 (!) 148/94   12/21/20 125/88   11/03/20 (!) 154/96          (goal 120/80)    All Future Testing planned in [facility]  Lab Frequency Next Occurrence   Basic Metabolic Panel Once 26/35/2148   HIV Screen Once 11/22/2022   Hepatitis C Antibody Once 11/22/2022               Patient Active Problem List:     Major depressive disorder, recurrent episode, severe (HCC)     Bilateral lower abdominal pain     Irritable bowel syndrome with constipation and diarrhea     Uterine leiomyoma     Mirena IUD (1/24/17)     Abnormal uterine bleeding (AUB)     Small bowel obstruction (HCC)     Essential hypertension     Seasonal allergies     Sleep difficulties     Bilateral carpal tunnel syndrome     Bipolar 1 disorder ([facility]

## 2021-12-06 ENCOUNTER — TELEPHONE (OUTPATIENT)
Dept: FAMILY MEDICINE CLINIC | Age: 51
End: 2021-12-06

## 2021-12-29 RX ORDER — MECLIZINE HCL 12.5 MG/1
TABLET ORAL
Qty: 90 TABLET | Refills: 10 | OUTPATIENT
Start: 2021-12-29

## 2022-01-07 DIAGNOSIS — K58.2 IRRITABLE BOWEL SYNDROME WITH CONSTIPATION AND DIARRHEA: ICD-10-CM

## 2022-01-07 DIAGNOSIS — I10 ESSENTIAL HYPERTENSION: ICD-10-CM

## 2022-01-10 RX ORDER — CLONIDINE HYDROCHLORIDE 0.3 MG/1
TABLET ORAL
Qty: 60 TABLET | Refills: 0 | Status: SHIPPED | OUTPATIENT
Start: 2022-01-10 | End: 2022-02-07

## 2022-01-10 RX ORDER — MONTELUKAST SODIUM 4 MG/1
TABLET, CHEWABLE ORAL
Qty: 30 TABLET | Refills: 1 | Status: SHIPPED | OUTPATIENT
Start: 2022-01-10 | End: 2022-03-11

## 2022-01-10 NOTE — TELEPHONE ENCOUNTER
Please address the medication refill and close the encounter. If I can be of assistance, please route to the applicable pool. Thank you. Last visit: 11/22/21  Last Med refill: 10/27/21  Does patient/ have enough medication for 72 hours: No:     Next Visit Date:  No future appointments. Health Maintenance   Topic Date Due    Hepatitis C screen  Never done    Depression Monitoring  Never done    HIV screen  Never done    Potassium monitoring  06/30/2021    Creatinine monitoring  06/30/2021    COVID-19 Vaccine (2 - Booster for Jo-Ann series) 07/14/2021    Cervical cancer screen  12/21/2021    Diabetes screen  09/16/2022    Lipid screen  09/16/2024    Colon cancer screen colonoscopy  02/26/2029    DTaP/Tdap/Td vaccine (2 - Td or Tdap) 10/09/2029    Flu vaccine  Completed    Hepatitis A vaccine  Aged Out    Hepatitis B vaccine  Aged Out    Hib vaccine  Aged Out    Meningococcal (ACWY) vaccine  Aged Out    Pneumococcal 0-64 years Vaccine  Aged Out       Hemoglobin A1C (%)   Date Value   09/16/2019 5.5   12/21/2016 6.1 (H)   05/16/2014 5.6             ( goal A1C is < 7)   Microalb/Crt.  Ratio (mcg/mg creat)   Date Value   10/26/2011 11     LDL Cholesterol (mg/dL)   Date Value   09/16/2019 45   05/17/2014 93       (goal LDL is <100)   AST (U/L)   Date Value   06/25/2020 17     ALT (U/L)   Date Value   06/25/2020 15     BUN (mg/dL)   Date Value   06/30/2020 4 (L)     BP Readings from Last 3 Encounters:   11/22/21 (!) 148/94   12/21/20 125/88   11/03/20 (!) 154/96          (goal 120/80)    All Future Testing planned in [facility]  Lab Frequency Next Occurrence   Basic Metabolic Panel Once 90/41/0848   HIV Screen Once 11/22/2022   Hepatitis C Antibody Once 11/22/2022               Patient Active Problem List:     Major depressive disorder, recurrent episode, severe (HCC)     Bilateral lower abdominal pain     Irritable bowel syndrome with constipation and diarrhea     Uterine leiomyoma     Mirena IUD (1/24/17)     Abnormal uterine bleeding (AUB)     Small bowel obstruction (HCC)     Essential hypertension     Seasonal allergies     Sleep difficulties     Bilateral carpal tunnel syndrome     Bipolar 1 disorder ([facility]

## 2022-01-10 NOTE — TELEPHONE ENCOUNTER
Please address the medication refill and close the encounter. If I can be of assistance, please route to the applicable pool. Thank you. Last visit: 11/22/21  Last Med refill: 11/24/21  Does patient have enough medication for 72 hours: No:     Next Visit Date:  No future appointments. Health Maintenance   Topic Date Due    Hepatitis C screen  Never done    Depression Monitoring  Never done    HIV screen  Never done    Potassium monitoring  06/30/2021    Creatinine monitoring  06/30/2021    COVID-19 Vaccine (2 - Booster for Jo-Ann series) 07/14/2021    Cervical cancer screen  12/21/2021    Diabetes screen  09/16/2022    Lipid screen  09/16/2024    Colon cancer screen colonoscopy  02/26/2029    DTaP/Tdap/Td vaccine (2 - Td or Tdap) 10/09/2029    Flu vaccine  Completed    Hepatitis A vaccine  Aged Out    Hepatitis B vaccine  Aged Out    Hib vaccine  Aged Out    Meningococcal (ACWY) vaccine  Aged Out    Pneumococcal 0-64 years Vaccine  Aged Out       Hemoglobin A1C (%)   Date Value   09/16/2019 5.5   12/21/2016 6.1 (H)   05/16/2014 5.6             ( goal A1C is < 7)   Microalb/Crt.  Ratio (mcg/mg creat)   Date Value   10/26/2011 11     LDL Cholesterol (mg/dL)   Date Value   09/16/2019 45   05/17/2014 93       (goal LDL is <100)   AST (U/L)   Date Value   06/25/2020 17     ALT (U/L)   Date Value   06/25/2020 15     BUN (mg/dL)   Date Value   06/30/2020 4 (L)     BP Readings from Last 3 Encounters:   11/22/21 (!) 148/94   12/21/20 125/88   11/03/20 (!) 154/96          (goal 120/80)    All Future Testing planned in [facility]  Lab Frequency Next Occurrence   Basic Metabolic Panel Once 01/37/7754   HIV Screen Once 11/22/2022   Hepatitis C Antibody Once 11/22/2022               Patient Active Problem List:     Major depressive disorder, recurrent episode, severe (HCC)     Bilateral lower abdominal pain     Irritable bowel syndrome with constipation and diarrhea     Uterine leiomyoma     Mirena IUD (1/24/17)     Abnormal uterine bleeding (AUB)     Small bowel obstruction (HCC)     Essential hypertension     Seasonal allergies     Sleep difficulties     Bilateral carpal tunnel syndrome     Bipolar 1 disorder ([facility]

## 2022-02-04 RX ORDER — PSEUDOEPHED/ACETAMINOPH/DIPHEN 30MG-500MG
TABLET ORAL
Qty: 120 TABLET | Refills: 10 | Status: SHIPPED | OUTPATIENT
Start: 2022-02-04

## 2022-02-04 NOTE — TELEPHONE ENCOUNTER
Last visit: 11/22/21  Last Med refill: 1/22/22  Does patient have enough medication for 72 hours: Yes    Next Visit Date:  No future appointments. Health Maintenance   Topic Date Due    Hepatitis C screen  Never done    Depression Monitoring  Never done    HIV screen  Never done    Potassium monitoring  06/30/2021    Creatinine monitoring  06/30/2021    COVID-19 Vaccine (2 - Booster for Jo-Ann series) 07/14/2021    Cervical cancer screen  12/21/2021    Diabetes screen  09/16/2022    Lipid screen  09/16/2024    Colon cancer screen colonoscopy  02/26/2029    DTaP/Tdap/Td vaccine (2 - Td or Tdap) 10/09/2029    Flu vaccine  Completed    Hepatitis A vaccine  Aged Out    Hepatitis B vaccine  Aged Out    Hib vaccine  Aged Out    Meningococcal (ACWY) vaccine  Aged Out    Pneumococcal 0-64 years Vaccine  Aged Out       Hemoglobin A1C (%)   Date Value   09/16/2019 5.5   12/21/2016 6.1 (H)   05/16/2014 5.6             ( goal A1C is < 7)   Microalb/Crt.  Ratio (mcg/mg creat)   Date Value   10/26/2011 11     LDL Cholesterol (mg/dL)   Date Value   09/16/2019 45   05/17/2014 93       (goal LDL is <100)   AST (U/L)   Date Value   06/25/2020 17     ALT (U/L)   Date Value   06/25/2020 15     BUN (mg/dL)   Date Value   06/30/2020 4 (L)     BP Readings from Last 3 Encounters:   11/22/21 (!) 148/94   12/21/20 125/88   11/03/20 (!) 154/96          (goal 120/80)    All Future Testing planned in [facility]  Lab Frequency Next Occurrence   Basic Metabolic Panel Once 71/75/6065   HIV Screen Once 11/22/2022   Hepatitis C Antibody Once 11/22/2022               Patient Active Problem List:     Major depressive disorder, recurrent episode, severe (HCC)     Bilateral lower abdominal pain     Irritable bowel syndrome with constipation and diarrhea     Uterine leiomyoma     Mirena IUD (1/24/17)     Abnormal uterine bleeding (AUB)     Small bowel obstruction (HCC)     Essential hypertension     Seasonal allergies     Sleep difficulties     Bilateral carpal tunnel syndrome     B[facility] ([facility]

## 2022-03-07 ENCOUNTER — TELEPHONE (OUTPATIENT)
Dept: FAMILY MEDICINE CLINIC | Age: 52
End: 2022-03-07

## 2022-03-11 DIAGNOSIS — R42 DIZZINESS: ICD-10-CM

## 2022-03-11 DIAGNOSIS — I10 ESSENTIAL HYPERTENSION: ICD-10-CM

## 2022-03-11 DIAGNOSIS — K58.2 IRRITABLE BOWEL SYNDROME WITH CONSTIPATION AND DIARRHEA: ICD-10-CM

## 2022-03-11 DIAGNOSIS — Z76.0 MEDICATION REFILL: ICD-10-CM

## 2022-03-11 RX ORDER — FOLIC ACID 1 MG/1
TABLET ORAL
Qty: 30 TABLET | Refills: 5 | Status: SHIPPED | OUTPATIENT
Start: 2022-03-11 | End: 2022-08-23

## 2022-03-11 RX ORDER — OMEPRAZOLE 20 MG/1
CAPSULE, DELAYED RELEASE ORAL
Qty: 30 CAPSULE | Refills: 5 | Status: SHIPPED | OUTPATIENT
Start: 2022-03-11 | End: 2022-04-22 | Stop reason: SDUPTHER

## 2022-03-11 RX ORDER — MONTELUKAST SODIUM 4 MG/1
TABLET, CHEWABLE ORAL
Qty: 30 TABLET | Refills: 1 | Status: SHIPPED | OUTPATIENT
Start: 2022-03-11 | End: 2022-04-15

## 2022-03-11 RX ORDER — CLONIDINE HYDROCHLORIDE 0.3 MG/1
TABLET ORAL
Qty: 30 TABLET | Refills: 0 | Status: ON HOLD | OUTPATIENT
Start: 2022-03-11 | End: 2022-03-14 | Stop reason: SDUPTHER

## 2022-03-11 RX ORDER — MECLIZINE HCL 12.5 MG/1
TABLET ORAL
Qty: 90 TABLET | Refills: 3 | Status: SHIPPED | OUTPATIENT
Start: 2022-03-11 | End: 2022-07-07

## 2022-03-11 NOTE — TELEPHONE ENCOUNTER
E-scribe request for med refill. Please review and e-scribe if applicable. Last Visit Date:  11/22/2021  Next Visit Date:  Visit date not found    Hemoglobin A1C (%)   Date Value   09/16/2019 5.5   12/21/2016 6.1 (H)   05/16/2014 5.6             ( goal A1C is < 7)   Microalb/Crt.  Ratio (mcg/mg creat)   Date Value   10/26/2011 11     LDL Cholesterol (mg/dL)   Date Value   09/16/2019 45       (goal LDL is <100)   AST (U/L)   Date Value   06/25/2020 17     ALT (U/L)   Date Value   06/25/2020 15     BUN (mg/dL)   Date Value   06/30/2020 4 (L)     BP Readings from Last 3 Encounters:   11/22/21 (!) 148/94   12/21/20 125/88   11/03/20 (!) 154/96          (goal 120/80)        Patient Active Problem List:     Major depressive disorder, recurrent episode, severe (HCC)     Bilateral lower abdominal pain     Irritable bowel syndrome with constipation and diarrhea     Uterine leiomyoma     Mirena IUD (1/24/17)     Abnormal uterine bleeding (AUB)     Small bowel obstruction (HCC)     Essential hypertension     Seasonal allergies     Sleep difficulties     Bilateral carpal tunnel syndrome     [facility] ([facility]      ----[place]

## 2022-03-11 NOTE — TELEPHONE ENCOUNTER
E-scribe request for med refills. Please review and e-scribe if applicable. Last Visit Date:  11/22/21  Next Visit Date:  3/11/2022    Hemoglobin A1C (%)   Date Value   09/16/2019 5.5   12/21/2016 6.1 (H)   05/16/2014 5.6             ( goal A1C is < 7)   Microalb/Crt.  Ratio (mcg/mg creat)   Date Value   10/26/2011 11     LDL Cholesterol (mg/dL)   Date Value   09/16/2019 45       (goal LDL is <100)   AST (U/L)   Date Value   06/25/2020 17     ALT (U/L)   Date Value   06/25/2020 15     BUN (mg/dL)   Date Value   06/30/2020 4 (L)     BP Readings from Last 3 Encounters:   11/22/21 (!) 148/94   12/21/20 125/88   11/03/20 (!) 154/96          (goal 120/80)        Patient Active Problem List:     Major depressive disorder, recurrent episode, severe (HCC)     Bilateral lower abdominal pain     Irritable bowel syndrome with constipation and diarrhea     Uterine leiomyoma     Mirena IUD (1/24/17)     Abnormal uterine bleeding (AUB)     Small bowel obstruction (HCC)     Essential hypertension     Seasonal allergies     Sleep difficulties     Bilateral carpal tunnel syndrome     Bipolar 1 disorder ([facility]      ----Fredy Sheridan

## 2022-03-11 NOTE — TELEPHONE ENCOUNTER
E-scribe request for med refills. Please review and e-scribe if applicable. Last Visit Date:  11/22/21  Next Visit Date:  3/11/2022    Hemoglobin A1C (%)   Date Value   09/16/2019 5.5   12/21/2016 6.1 (H)   05/16/2014 5.6             ( goal A1C is < 7)   Microalb/Crt.  Ratio (mcg/mg creat)   Date Value   10/26/2011 11     LDL Cholesterol (mg/dL)   Date Value   09/16/2019 45       (goal LDL is <100)   AST (U/L)   Date Value   06/25/2020 17     ALT (U/L)   Date Value   06/25/2020 15     BUN (mg/dL)   Date Value   06/30/2020 4 (L)     BP Readings from Last 3 Encounters:   11/22/21 (!) 148/94   12/21/20 125/88   11/03/20 (!) 154/96          (goal 120/80)        Patient Active Problem List:     Major depressive disorder, recurrent episode, severe (HCC)     Bilateral lower abdominal pain     Irritable bowel syndrome with constipation and diarrhea     Uterine leiomyoma     Mirena IUD (1/24/17)     Abnormal uterine bleeding (AUB)     Small bowel obstruction (HCC)     Essential hypertension     Seasonal allergies     Sleep difficulties     Bilateral carpal tunnel syndrome     Bipolar 1 disorder ([facility]      ----Luis Bryan

## 2022-03-11 NOTE — TELEPHONE ENCOUNTER
E-scribe request for med refill. Please review and e-scribe if applicable. Last Visit Date:  11/22/2021  Next Visit Date:  Visit date not found    Hemoglobin A1C (%)   Date Value   09/16/2019 5.5   12/21/2016 6.1 (H)   05/16/2014 5.6             ( goal A1C is < 7)   Microalb/Crt.  Ratio (mcg/mg creat)   Date Value   10/26/2011 11     LDL Cholesterol (mg/dL)   Date Value   09/16/2019 45       (goal LDL is <100)   AST (U/L)   Date Value   06/25/2020 17     ALT (U/L)   Date Value   06/25/2020 15     BUN (mg/dL)   Date Value   06/30/2020 4 (L)     BP Readings from Last 3 Encounters:   11/22/21 (!) 148/94   12/21/20 125/88   11/03/20 (!) 154/96          (goal 120/80)        Patient Active Problem List:     Major depressive disorder, recurrent episode, severe (HCC)     Bilateral lower abdominal pain     Irritable bowel syndrome with constipation and diarrhea     Uterine leiomyoma     Mirena IUD (1/24/17)     Abnormal uterine bleeding (AUB)     Small bowel obstruction (HCC)     Essential hypertension     Seasonal allergies     Sleep difficulties     Bilateral carpal tunnel syndrome     Bipolar 1 disorder ([facility]      ----[employer]

## 2022-03-13 ENCOUNTER — APPOINTMENT (OUTPATIENT)
Dept: GENERAL RADIOLOGY | Age: 52
End: 2022-03-13
Payer: COMMERCIAL

## 2022-03-13 ENCOUNTER — HOSPITAL ENCOUNTER (OUTPATIENT)
Age: 52
Setting detail: OBSERVATION
Discharge: HOME | End: 2022-03-14
Attending: EMERGENCY MEDICINE | Admitting: EMERGENCY MEDICINE
Payer: COMMERCIAL

## 2022-03-13 DIAGNOSIS — R10.30 LOWER ABDOMINAL PAIN: ICD-10-CM

## 2022-03-13 DIAGNOSIS — R07.9 CHEST PAIN, UNSPECIFIED TYPE: Primary | ICD-10-CM

## 2022-03-13 DIAGNOSIS — G47.9 SLEEP DIFFICULTIES: ICD-10-CM

## 2022-03-13 DIAGNOSIS — I10 ESSENTIAL HYPERTENSION: ICD-10-CM

## 2022-03-13 LAB
ABSOLUTE EOS #: 0.09 K/UL (ref 0–0.44)
ABSOLUTE IMMATURE GRANULOCYTE: <0.03 K/UL (ref 0–0.3)
ABSOLUTE LYMPH #: 3.17 K/UL (ref 1.1–3.7)
ABSOLUTE MONO #: 0.35 K/UL (ref 0.1–1.2)
ANION GAP SERPL CALCULATED.3IONS-SCNC: 10 MMOL/L (ref 9–17)
BASOPHILS # BLD: 1 % (ref 0–2)
BASOPHILS ABSOLUTE: 0.03 K/UL (ref 0–0.2)
BUN BLDV-MCNC: 15 MG/DL (ref 6–20)
CALCIUM SERPL-MCNC: 9 MG/DL (ref 8.6–10.4)
CHLORIDE BLD-SCNC: 104 MMOL/L (ref 98–107)
CO2: 23 MMOL/L (ref 20–31)
CREAT SERPL-MCNC: 0.64 MG/DL (ref 0.5–0.9)
D-DIMER QUANTITATIVE: 0.29 MG/L FEU
EOSINOPHILS RELATIVE PERCENT: 2 % (ref 1–4)
GFR AFRICAN AMERICAN: >60 ML/MIN
GFR NON-AFRICAN AMERICAN: >60 ML/MIN
GFR SERPL CREATININE-BSD FRML MDRD: ABNORMAL ML/MIN/{1.73_M2}
GLUCOSE BLD-MCNC: 109 MG/DL (ref 70–99)
HCG QUALITATIVE: NEGATIVE
HCT VFR BLD CALC: 37 % (ref 36.3–47.1)
HEMOGLOBIN: 12 G/DL (ref 11.9–15.1)
IMMATURE GRANULOCYTES: 0 %
LYMPHOCYTES # BLD: 53 % (ref 24–43)
MCH RBC QN AUTO: 29.1 PG (ref 25.2–33.5)
MCHC RBC AUTO-ENTMCNC: 32.4 G/DL (ref 28.4–34.8)
MCV RBC AUTO: 89.8 FL (ref 82.6–102.9)
MONOCYTES # BLD: 6 % (ref 3–12)
NRBC AUTOMATED: 0 PER 100 WBC
PDW BLD-RTO: 13.9 % (ref 11.8–14.4)
PLATELET # BLD: 223 K/UL (ref 138–453)
PMV BLD AUTO: 10.2 FL (ref 8.1–13.5)
POTASSIUM SERPL-SCNC: 3.7 MMOL/L (ref 3.7–5.3)
PRO-BNP: 32 PG/ML
RBC # BLD: 4.12 M/UL (ref 3.95–5.11)
SEG NEUTROPHILS: 38 % (ref 36–65)
SEGMENTED NEUTROPHILS ABSOLUTE COUNT: 2.25 K/UL (ref 1.5–8.1)
SODIUM BLD-SCNC: 137 MMOL/L (ref 135–144)
TROPONIN, HIGH SENSITIVITY: <6 NG/L (ref 0–14)
WBC # BLD: 5.9 K/UL (ref 3.5–11.3)

## 2022-03-13 PROCEDURE — 2500000003 HC RX 250 WO HCPCS: Performed by: STUDENT IN AN ORGANIZED HEALTH CARE EDUCATION/TRAINING PROGRAM

## 2022-03-13 PROCEDURE — 84703 CHORIONIC GONADOTROPIN ASSAY: CPT

## 2022-03-13 PROCEDURE — 80048 BASIC METABOLIC PNL TOTAL CA: CPT

## 2022-03-13 PROCEDURE — 84484 ASSAY OF TROPONIN QUANT: CPT

## 2022-03-13 PROCEDURE — 83880 ASSAY OF NATRIURETIC PEPTIDE: CPT

## 2022-03-13 PROCEDURE — 96375 TX/PRO/DX INJ NEW DRUG ADDON: CPT

## 2022-03-13 PROCEDURE — 96374 THER/PROPH/DIAG INJ IV PUSH: CPT

## 2022-03-13 PROCEDURE — 93005 ELECTROCARDIOGRAM TRACING: CPT | Performed by: STUDENT IN AN ORGANIZED HEALTH CARE EDUCATION/TRAINING PROGRAM

## 2022-03-13 PROCEDURE — 99285 EMERGENCY DEPT VISIT HI MDM: CPT

## 2022-03-13 PROCEDURE — 6360000002 HC RX W HCPCS: Performed by: STUDENT IN AN ORGANIZED HEALTH CARE EDUCATION/TRAINING PROGRAM

## 2022-03-13 PROCEDURE — 71045 X-RAY EXAM CHEST 1 VIEW: CPT

## 2022-03-13 PROCEDURE — 85379 FIBRIN DEGRADATION QUANT: CPT

## 2022-03-13 PROCEDURE — 85025 COMPLETE CBC W/AUTO DIFF WBC: CPT

## 2022-03-13 RX ORDER — MORPHINE SULFATE 4 MG/ML
4 INJECTION, SOLUTION INTRAMUSCULAR; INTRAVENOUS ONCE
Status: COMPLETED | OUTPATIENT
Start: 2022-03-13 | End: 2022-03-13

## 2022-03-13 RX ADMIN — MORPHINE SULFATE 4 MG: 4 INJECTION INTRAVENOUS at 23:13

## 2022-03-13 RX ADMIN — FAMOTIDINE 20 MG: 10 INJECTION, SOLUTION INTRAVENOUS at 22:21

## 2022-03-13 ASSESSMENT — PAIN SCALES - GENERAL
PAINLEVEL_OUTOF10: 10

## 2022-03-13 ASSESSMENT — PAIN DESCRIPTION - LOCATION
LOCATION: GENERALIZED
LOCATION: CHEST;GENERALIZED

## 2022-03-13 ASSESSMENT — PAIN DESCRIPTION - DESCRIPTORS
DESCRIPTORS: ACHING;SHARP
DESCRIPTORS: ACHING;SHARP;PINS AND NEEDLES

## 2022-03-13 ASSESSMENT — PAIN DESCRIPTION - FREQUENCY: FREQUENCY: CONTINUOUS

## 2022-03-14 ENCOUNTER — APPOINTMENT (OUTPATIENT)
Dept: NUCLEAR MEDICINE | Age: 52
End: 2022-03-14
Payer: COMMERCIAL

## 2022-03-14 ENCOUNTER — APPOINTMENT (OUTPATIENT)
Dept: GENERAL RADIOLOGY | Age: 52
End: 2022-03-14
Payer: COMMERCIAL

## 2022-03-14 VITALS
OXYGEN SATURATION: 99 % | RESPIRATION RATE: 18 BRPM | WEIGHT: 209 LBS | DIASTOLIC BLOOD PRESSURE: 101 MMHG | SYSTOLIC BLOOD PRESSURE: 160 MMHG | TEMPERATURE: 98.6 F | HEART RATE: 75 BPM | BODY MASS INDEX: 39.51 KG/M2

## 2022-03-14 PROBLEM — R07.89 ATYPICAL CHEST PAIN: Status: ACTIVE | Noted: 2022-03-14

## 2022-03-14 LAB
HCG QUALITATIVE: NEGATIVE
LV EF: 58 %
LV EF: 60 %
LVEF MODALITY: NORMAL
LVEF MODALITY: NORMAL
SARS-COV-2, RAPID: NOT DETECTED
SPECIMEN DESCRIPTION: NORMAL
TROPONIN, HIGH SENSITIVITY: <6 NG/L (ref 0–14)

## 2022-03-14 PROCEDURE — 6360000002 HC RX W HCPCS: Performed by: INTERNAL MEDICINE

## 2022-03-14 PROCEDURE — 93306 TTE W/DOPPLER COMPLETE: CPT

## 2022-03-14 PROCEDURE — G0378 HOSPITAL OBSERVATION PER HR: HCPCS

## 2022-03-14 PROCEDURE — 93971 EXTREMITY STUDY: CPT

## 2022-03-14 PROCEDURE — 72040 X-RAY EXAM NECK SPINE 2-3 VW: CPT

## 2022-03-14 PROCEDURE — 3430000000 HC RX DIAGNOSTIC RADIOPHARMACEUTICAL: Performed by: INTERNAL MEDICINE

## 2022-03-14 PROCEDURE — 2580000003 HC RX 258: Performed by: INTERNAL MEDICINE

## 2022-03-14 PROCEDURE — 36415 COLL VENOUS BLD VENIPUNCTURE: CPT

## 2022-03-14 PROCEDURE — 6370000000 HC RX 637 (ALT 250 FOR IP): Performed by: STUDENT IN AN ORGANIZED HEALTH CARE EDUCATION/TRAINING PROGRAM

## 2022-03-14 PROCEDURE — 73110 X-RAY EXAM OF WRIST: CPT

## 2022-03-14 PROCEDURE — 93017 CV STRESS TEST TRACING ONLY: CPT

## 2022-03-14 PROCEDURE — A9500 TC99M SESTAMIBI: HCPCS | Performed by: INTERNAL MEDICINE

## 2022-03-14 PROCEDURE — 78452 HT MUSCLE IMAGE SPECT MULT: CPT

## 2022-03-14 PROCEDURE — 6370000000 HC RX 637 (ALT 250 FOR IP): Performed by: HEALTH CARE PROVIDER

## 2022-03-14 PROCEDURE — 84703 CHORIONIC GONADOTROPIN ASSAY: CPT

## 2022-03-14 PROCEDURE — 2580000003 HC RX 258: Performed by: STUDENT IN AN ORGANIZED HEALTH CARE EDUCATION/TRAINING PROGRAM

## 2022-03-14 PROCEDURE — 87635 SARS-COV-2 COVID-19 AMP PRB: CPT

## 2022-03-14 RX ORDER — ONDANSETRON 2 MG/ML
4 INJECTION INTRAMUSCULAR; INTRAVENOUS EVERY 6 HOURS PRN
Status: DISCONTINUED | OUTPATIENT
Start: 2022-03-14 | End: 2022-03-14 | Stop reason: HOSPADM

## 2022-03-14 RX ORDER — SODIUM CHLORIDE 0.9 % (FLUSH) 0.9 %
5-40 SYRINGE (ML) INJECTION EVERY 12 HOURS SCHEDULED
Status: DISCONTINUED | OUTPATIENT
Start: 2022-03-14 | End: 2022-03-14 | Stop reason: HOSPADM

## 2022-03-14 RX ORDER — MECLIZINE HCL 12.5 MG/1
25 TABLET ORAL ONCE
Status: COMPLETED | OUTPATIENT
Start: 2022-03-14 | End: 2022-03-14

## 2022-03-14 RX ORDER — ATROPINE SULFATE 0.1 MG/ML
0.5 INJECTION INTRAVENOUS EVERY 5 MIN PRN
Status: DISCONTINUED | OUTPATIENT
Start: 2022-03-14 | End: 2022-03-14 | Stop reason: ALTCHOICE

## 2022-03-14 RX ORDER — METOPROLOL TARTRATE 5 MG/5ML
5 INJECTION INTRAVENOUS EVERY 5 MIN PRN
Status: DISCONTINUED | OUTPATIENT
Start: 2022-03-14 | End: 2022-03-14 | Stop reason: ALTCHOICE

## 2022-03-14 RX ORDER — LOSARTAN POTASSIUM 25 MG/1
25 TABLET ORAL DAILY
Status: DISCONTINUED | OUTPATIENT
Start: 2022-03-14 | End: 2022-03-14 | Stop reason: HOSPADM

## 2022-03-14 RX ORDER — PRAZOSIN HYDROCHLORIDE 2 MG/1
2 CAPSULE ORAL DAILY PRN
Qty: 30 CAPSULE | Refills: 0 | Status: SHIPPED | OUTPATIENT
Start: 2022-03-14 | End: 2022-04-13

## 2022-03-14 RX ORDER — SERTRALINE HYDROCHLORIDE 100 MG/1
100 TABLET, FILM COATED ORAL DAILY
Qty: 30 TABLET | Refills: 0 | Status: SHIPPED | OUTPATIENT
Start: 2022-03-15 | End: 2022-04-22 | Stop reason: SDUPTHER

## 2022-03-14 RX ORDER — ACETAMINOPHEN 325 MG/1
650 TABLET ORAL EVERY 4 HOURS PRN
Status: DISCONTINUED | OUTPATIENT
Start: 2022-03-14 | End: 2022-03-14 | Stop reason: HOSPADM

## 2022-03-14 RX ORDER — ONDANSETRON 4 MG/1
4 TABLET, ORALLY DISINTEGRATING ORAL EVERY 8 HOURS PRN
Status: DISCONTINUED | OUTPATIENT
Start: 2022-03-14 | End: 2022-03-14 | Stop reason: HOSPADM

## 2022-03-14 RX ORDER — TRAZODONE HYDROCHLORIDE 100 MG/1
200 TABLET ORAL NIGHTLY PRN
Status: DISCONTINUED | OUTPATIENT
Start: 2022-03-14 | End: 2022-03-14 | Stop reason: HOSPADM

## 2022-03-14 RX ORDER — SODIUM CHLORIDE 0.9 % (FLUSH) 0.9 %
5-40 SYRINGE (ML) INJECTION PRN
Status: DISCONTINUED | OUTPATIENT
Start: 2022-03-14 | End: 2022-03-14 | Stop reason: HOSPADM

## 2022-03-14 RX ORDER — AMINOPHYLLINE DIHYDRATE 25 MG/ML
50 INJECTION, SOLUTION INTRAVENOUS PRN
Status: DISCONTINUED | OUTPATIENT
Start: 2022-03-14 | End: 2022-03-14 | Stop reason: ALTCHOICE

## 2022-03-14 RX ORDER — GABAPENTIN 100 MG/1
300 CAPSULE ORAL 3 TIMES DAILY
Qty: 270 CAPSULE | Refills: 0 | Status: SHIPPED | OUTPATIENT
Start: 2022-03-14 | End: 2022-04-22 | Stop reason: SDUPTHER

## 2022-03-14 RX ORDER — AMLODIPINE BESYLATE 10 MG/1
10 TABLET ORAL DAILY
Status: DISCONTINUED | OUTPATIENT
Start: 2022-03-14 | End: 2022-03-14 | Stop reason: HOSPADM

## 2022-03-14 RX ORDER — NITROGLYCERIN 0.4 MG/1
0.4 TABLET SUBLINGUAL EVERY 5 MIN PRN
Status: DISCONTINUED | OUTPATIENT
Start: 2022-03-14 | End: 2022-03-14 | Stop reason: ALTCHOICE

## 2022-03-14 RX ORDER — SODIUM CHLORIDE 0.9 % (FLUSH) 0.9 %
10 SYRINGE (ML) INJECTION PRN
Status: DISCONTINUED | OUTPATIENT
Start: 2022-03-14 | End: 2022-03-14 | Stop reason: HOSPADM

## 2022-03-14 RX ORDER — SODIUM CHLORIDE 0.9 % (FLUSH) 0.9 %
5-40 SYRINGE (ML) INJECTION PRN
Status: DISCONTINUED | OUTPATIENT
Start: 2022-03-14 | End: 2022-03-14 | Stop reason: ALTCHOICE

## 2022-03-14 RX ORDER — ESTRADIOL 1 MG/1
1 TABLET ORAL DAILY
Qty: 30 TABLET | Refills: 0 | Status: SHIPPED | OUTPATIENT
Start: 2022-03-14 | End: 2022-04-13

## 2022-03-14 RX ORDER — SODIUM CHLORIDE 9 MG/ML
500 INJECTION, SOLUTION INTRAVENOUS CONTINUOUS PRN
Status: DISCONTINUED | OUTPATIENT
Start: 2022-03-14 | End: 2022-03-14 | Stop reason: ALTCHOICE

## 2022-03-14 RX ORDER — HYDROCHLOROTHIAZIDE 25 MG/1
25 TABLET ORAL DAILY
Status: DISCONTINUED | OUTPATIENT
Start: 2022-03-14 | End: 2022-03-14 | Stop reason: HOSPADM

## 2022-03-14 RX ORDER — CLONIDINE HYDROCHLORIDE 0.3 MG/1
TABLET ORAL
Qty: 30 TABLET | Refills: 0 | Status: SHIPPED | OUTPATIENT
Start: 2022-03-14 | End: 2022-05-16

## 2022-03-14 RX ORDER — HYDROCODONE BITARTRATE AND ACETAMINOPHEN 5; 325 MG/1; MG/1
2 TABLET ORAL EVERY 4 HOURS PRN
Status: DISCONTINUED | OUTPATIENT
Start: 2022-03-14 | End: 2022-03-14 | Stop reason: HOSPADM

## 2022-03-14 RX ORDER — HYDROCODONE BITARTRATE AND ACETAMINOPHEN 5; 325 MG/1; MG/1
1 TABLET ORAL EVERY 4 HOURS PRN
Status: DISCONTINUED | OUTPATIENT
Start: 2022-03-14 | End: 2022-03-14 | Stop reason: HOSPADM

## 2022-03-14 RX ORDER — ALBUTEROL SULFATE 90 UG/1
2 AEROSOL, METERED RESPIRATORY (INHALATION) PRN
Status: DISCONTINUED | OUTPATIENT
Start: 2022-03-14 | End: 2022-03-14 | Stop reason: ALTCHOICE

## 2022-03-14 RX ORDER — LANOLIN ALCOHOL/MO/W.PET/CERES
3 CREAM (GRAM) TOPICAL NIGHTLY PRN
Qty: 30 TABLET | Refills: 0 | Status: SHIPPED | OUTPATIENT
Start: 2022-03-14

## 2022-03-14 RX ORDER — SODIUM CHLORIDE 9 MG/ML
25 INJECTION, SOLUTION INTRAVENOUS PRN
Status: DISCONTINUED | OUTPATIENT
Start: 2022-03-14 | End: 2022-03-14 | Stop reason: HOSPADM

## 2022-03-14 RX ORDER — TRAZODONE HYDROCHLORIDE 100 MG/1
200 TABLET ORAL NIGHTLY PRN
Qty: 60 TABLET | Refills: 0 | Status: SHIPPED | OUTPATIENT
Start: 2022-03-14 | End: 2022-04-13

## 2022-03-14 RX ADMIN — SODIUM CHLORIDE, PRESERVATIVE FREE 10 ML: 5 INJECTION INTRAVENOUS at 12:09

## 2022-03-14 RX ADMIN — TETRAKIS(2-METHOXYISOBUTYLISOCYANIDE)COPPER(I) TETRAFLUOROBORATE 14 MILLICURIE: 1 INJECTION, POWDER, LYOPHILIZED, FOR SOLUTION INTRAVENOUS at 10:30

## 2022-03-14 RX ADMIN — HYDROCODONE BITARTRATE AND ACETAMINOPHEN 2 TABLET: 5; 325 TABLET ORAL at 15:05

## 2022-03-14 RX ADMIN — AMLODIPINE BESYLATE 10 MG: 10 TABLET ORAL at 15:02

## 2022-03-14 RX ADMIN — ONDANSETRON 4 MG: 4 TABLET, ORALLY DISINTEGRATING ORAL at 17:40

## 2022-03-14 RX ADMIN — SODIUM CHLORIDE, PRESERVATIVE FREE 10 ML: 5 INJECTION INTRAVENOUS at 12:01

## 2022-03-14 RX ADMIN — Medication 10 ML: at 15:03

## 2022-03-14 RX ADMIN — HYDROCODONE BITARTRATE AND ACETAMINOPHEN 2 TABLET: 5; 325 TABLET ORAL at 06:54

## 2022-03-14 RX ADMIN — TETRAKIS(2-METHOXYISOBUTYLISOCYANIDE)COPPER(I) TETRAFLUOROBORATE 40 MILLICURIE: 1 INJECTION, POWDER, LYOPHILIZED, FOR SOLUTION INTRAVENOUS at 12:09

## 2022-03-14 RX ADMIN — SODIUM CHLORIDE, PRESERVATIVE FREE 10 ML: 5 INJECTION INTRAVENOUS at 10:30

## 2022-03-14 RX ADMIN — SERTRALINE 100 MG: 50 TABLET, FILM COATED ORAL at 15:01

## 2022-03-14 RX ADMIN — LOSARTAN POTASSIUM 25 MG: 25 TABLET, FILM COATED ORAL at 15:02

## 2022-03-14 RX ADMIN — MECLIZINE HCL 12.5 MG 25 MG: 12.5 TABLET ORAL at 17:40

## 2022-03-14 RX ADMIN — SODIUM CHLORIDE, PRESERVATIVE FREE 10 ML: 5 INJECTION INTRAVENOUS at 15:04

## 2022-03-14 RX ADMIN — HYDROCHLOROTHIAZIDE 25 MG: 25 TABLET ORAL at 15:01

## 2022-03-14 RX ADMIN — HYDROCODONE BITARTRATE AND ACETAMINOPHEN 2 TABLET: 5; 325 TABLET ORAL at 10:52

## 2022-03-14 RX ADMIN — SODIUM CHLORIDE, PRESERVATIVE FREE 10 ML: 5 INJECTION INTRAVENOUS at 12:07

## 2022-03-14 RX ADMIN — REGADENOSON 0.4 MG: 0.08 INJECTION, SOLUTION INTRAVENOUS at 12:07

## 2022-03-14 RX ADMIN — HYDROCODONE BITARTRATE AND ACETAMINOPHEN 2 TABLET: 5; 325 TABLET ORAL at 02:56

## 2022-03-14 ASSESSMENT — PAIN DESCRIPTION - PAIN TYPE: TYPE: ACUTE PAIN

## 2022-03-14 ASSESSMENT — ENCOUNTER SYMPTOMS
SHORTNESS OF BREATH: 0
VOMITING: 0
BACK PAIN: 0
ABDOMINAL PAIN: 0
NAUSEA: 0

## 2022-03-14 ASSESSMENT — PAIN SCALES - GENERAL
PAINLEVEL_OUTOF10: 8
PAINLEVEL_OUTOF10: 8
PAINLEVEL_OUTOF10: 0
PAINLEVEL_OUTOF10: 8
PAINLEVEL_OUTOF10: 8

## 2022-03-14 ASSESSMENT — PAIN DESCRIPTION - ORIENTATION: ORIENTATION: RIGHT;LEFT

## 2022-03-14 ASSESSMENT — PAIN DESCRIPTION - LOCATION: LOCATION: WRIST

## 2022-03-14 ASSESSMENT — HEART SCORE: ECG: 1

## 2022-03-14 NOTE — ED NOTES
The patient is a 54year old female that came to the ED today for chest pains. SW consulted due to the patient voicing concerns about having to leave her home at the end of the month. Patient reports that she has Section 8 housing and the current home she lives in does not meet their standards so it can no longer be a section 8 approved home. Patient reports that she has no where to live after the 31st, no way to look for a new home and no one to help her move. Patient states that she attempted to seek help from [employer] but states that they will not help her find a new place to live. SW and patient discussed options such as looking for places online. Patient reports barriers such as not knowing how to navigate the internet. SW suggested that the patient look in the [facility] or ask for help from her daughter. SW and patient processed other barriers and any solutions that were discussed the patient had excuses as to why she could not follow through. SW therapeutically confronted the patient on her need to put effort into her barriers. SW will contact [employer] to see if they can connect with patient tomorrow for an assistance.

## 2022-03-14 NOTE — CARE COORDINATION
Have received consults regarding housing issues. Numerous attempts made to see pt today and pt has been off floor due to testing. Spoke with RN who believes pt will be back in room at 3. Will attempt to see pt again later this afternoon.

## 2022-03-14 NOTE — CARE COORDINATION
Case Management Initial Discharge Plan  Izabella Lagos,             Met with:patient to discuss discharge plans. Information verified: address, contacts, phone number, , insurance Yes  Insurance Provider: [facility]     Emergency Contact/Next of Kin name & number: Kaylah Smith, Parent [phone]  Who are involved in patient's support system? Parent     PCP: Marylin Adames MD  Date of last visit: 4 months ago       Discharge Planning    Living Arrangements:        Home has 2 stories  8 stairs to climb to get into front door, 1 flight stairs to climb to reach second floor  Location of bedroom/bathroom in home second floor     Patient able to perform ADL's:Independent    Current Services (outpatient & in home) none   DME equipment: na   DME provider: na    Is patient receiving oral anticoagulation therapy? No    If indicated:   Physician managing anticoagulation treatment: na   Where does patient obtain lab work for ATC treatment? Na       Potential Assistance Needed:       Patient agreeable to home care: No  [place] of choice provided:  n/a    Prior SNF/Rehab Placement and Facility: yes   Agreeable to SNF/Rehab: No  [place] of choice provided: n/a     Evaluation: no    Expected Discharge date:       Patient expects to be discharged to: If home: is the family and/or caregiver wiling & able to provide support at home? Yes   Who will be providing this support? Son *    Follow Up Appointment: Best Day/ Time:      Transportation provider: andrew   Transportation arrangements needed for discharge: No    Readmission Risk              Risk of Unplanned Readmission:  0             Does patient have a readmission risk score greater than 14?: No  If yes, follow-up appointment must be made within 7 days of discharge. Goals of Care:  To get my hands and chest to start hurting       Educated patient  on transitional options, provided freedom of choice and are agreeable with plan      Discharge Plan: Home independently           Electronically signed by Angelina Resendez RN on 3/14/22 at 11:34 AM EDT

## 2022-03-14 NOTE — PROGRESS NOTES
Physical Therapy        Physical Therapy Cancel Note      DATE: 3/14/2022    NAME: Louise Balderas  MRN: [id number]   : 1970      Patient not seen this date for Physical Therapy due to: Other: Dopplers ordered for RUE, will await results prior to PT evaluation.  Ck as able      Electronically signed by Chanda Jenkins PT on 3/14/2022 at 8:15 AM

## 2022-03-14 NOTE — DISCHARGE SUMMARY
CDU Discharge Summary        Patient:  Cassius Sweeney  YOB: 1970    MRN: [id number]   Acct: [de-identified]    Primary Care Physician: Eduard Castellanos MD    Admit date:  3/13/2022  9:49 PM  Discharge date: 3/14/2022  6:50 PM     Discharge Diagnoses:     1.) Atypical chest pain - acute, resolved, negative cardiac testing    Follow-up:  Call today/tomorrow for a follow up appointment with Eduard Castellanos MD , or return to the Emergency Room with worsening symptoms    Stressed to patient the importance of following up with primary care doctor for further workup/management of symptoms. Pt verbalizes understanding and agrees with plan. Discharge Medication Changes:       Medication List      CHANGE how you take these medications    cloNIDine 0.3 MG tablet  Commonly known as: CATAPRES  One tablet twice a day  What changed: See the new instructions. gabapentin 100 MG capsule  Commonly known as: NEURONTIN  Take 3 capsules by mouth 3 times daily for 30 days.  May cause sedation  What changed: additional instructions     melatonin 3 MG Tabs tablet  Commonly known as: RA Melatonin  Take 1 tablet by mouth nightly as needed (for sleep)  What changed:   · when to take this  · reasons to take this     prazosin 2 MG capsule  Commonly known as: MINIPRESS  Take 1 capsule by mouth daily as needed (PANIC)  What changed:   · how much to take  · how to take this  · when to take this  · reasons to take this     sertraline 100 MG tablet  Commonly known as: ZOLOFT  Take 1 tablet by mouth daily  What changed: when to take this        CONTINUE taking these medications    Acetaminophen Extra Strength 500 MG tablet  Generic drug: acetaminophen  TAKE 1 TABLET BY MOUTH FOUR TIMES DAILY AS NEEDED FOR PAIN *F/U:MARTINEZ -[phone] [address] [place] [address]*     amLODIPine 5 MG tablet  Commonly known as: NORVASC  Take 2 tablets by mouth daily     Blood Pressure Kit  1 each by Does not apply route as needed (take as needed for blood pressure monitoring at home)     butenafine 1 % Crea  Commonly known as: Lotrimin Ultra  Apply 1 Tube topically as needed (apply to affected area as needed)     colestipol 1 g tablet  Commonly known as: COLESTID  TAKE 1 TABLET BY MOUTH IN THE MORNING     D3 Super Strength 50 MCG (2000 UT) Caps  Generic drug: Cholecalciferol  TAKE (1) CAPSULE BY MOUTH DAILY     diclofenac sodium 1 % Gel  Commonly known as: VOLTAREN  APPLY 2 GRAMS TOPICALLY FOUR TIMES A DAY     estradiol 1 MG tablet  Commonly known as: ESTRACE  Take 1 tablet by mouth daily     fleet 7-19 GM/118ML  Place 1 enema rectally daily as needed (constipation). folic acid 1 MG tablet  Commonly known as: FOLVITE  TAKE 1 TABLET BY MOUTH DAILY     hydroCHLOROthiazide 25 MG tablet  Commonly known as: HYDRODIURIL  TAKE 1 TABLET BY MOUTH DAILY     Lidocaine (Anorectal) 5 % Crea  Commonly known as: Hemorrhoidal Relief  Apply 1 Applicatorful topically daily     loratadine 10 MG tablet  Commonly known as: CLARITIN  TAKE 1 TABLET BY MOUTH NIGHTLY AS NEEDED FOR SEASONAL ALLERGIES     losartan 25 MG tablet  Commonly known as: COZAAR  Take 1 tablet by mouth daily     meclizine 12.5 MG tablet  Commonly known as: ANTIVERT  TAKE 1 TABLET BY MOUTH THREE TIMES A DAY AS NEEDED FOR DIZZINESS OR NAUSEA.      mineral oil liquid  TAKE 30 ML BY MOUTH DAILY AS NEEDED FOR CONSTIPATION     multivitamin with minerals tablet  Take 1 tablet by mouth daily     omeprazole 20 MG delayed release capsule  Commonly known as: PRILOSEC  TAKE 1 CAPSULE BY MOUTH DAILY WITH BREAKFAST     ondansetron 4 MG disintegrating tablet  Commonly known as: ZOFRAN-ODT  Take 1 tablet by mouth 3 times daily as needed for Nausea or Vomiting     polyethylene glycol 17 GM/SCOOP powder  Commonly known as: GLYCOLAX  DISSOLVE 17 GRAMS (1 CAPFUL) IN 8 OUNCES OF LIQUID AND DRINK BY MOUTH ONCE DAILY     [facility] Incontinence Pads Misc  1 each by Does not apply route daily as needed (for incontinence as needed)     traZODone 100 MG tablet  Commonly known as: DESYREL  Take 2 tablets by mouth nightly as needed for Sleep     Ventolin  (90 Base) MCG/ACT inhaler  Generic drug: albuterol sulfate HFA  INHALE TWO (2) PUFFS BY MOUTH FOUR TIMES A DAY AS NEEDED FOR WHEEZING           Where to Get Your Medications      These medications were sent to [facility], [address] [place]  [address]    Phone: [phone]   · cloNIDine 0.3 MG tablet  · estradiol 1 MG tablet  · gabapentin 100 MG capsule  · melatonin 3 MG Tabs tablet  · prazosin 2 MG capsule  · sertraline 100 MG tablet  · traZODone 100 MG tablet         Diet:  No diet orders on file, advance as tolerated     Activity:  As tolerated    Consultants: IP CONSULT TO ORTHOPEDIC SURGERY  IP CONSULT TO FAMILY MEDICINE  IP CONSULT TO CARDIOLOGY  IP CONSULT TO SOCIAL WORK  IP CONSULT TO SOCIAL WORK  IP CONSULT TO SOCIAL WORK    Procedures:  Not indicated     Diagnostic Test:   Results for orders placed or performed during the hospital encounter of 03/13/22   COVID-19, Rapid    Specimen: Nasopharyngeal Swab   Result Value Ref Range    Specimen Description . NASOPHARYNGEAL SWAB     SARS-CoV-2, Rapid Not Detected Not Detected   CBC with Auto Differential   Result Value Ref Range    WBC 5.9 3.5 - 11.3 k/uL    RBC 4.12 3.95 - 5.11 m/uL    Hemoglobin 12.0 11.9 - 15.1 g/dL    Hematocrit 37.0 36.3 - 47.1 %    MCV 89.8 82.6 - 102.9 fL    MCH 29.1 25.2 - 33.5 pg    MCHC 32.4 28.4 - 34.8 g/dL    RDW 13.9 11.8 - 14.4 %    Platelets 008 111 - 383 k/uL    MPV 10.2 8.1 - 13.5 fL    NRBC Automated 0.0 0.0 per 100 WBC    Seg Neutrophils 38 36 - 65 %    Lymphocytes 53 (H) 24 - 43 %    Monocytes 6 3 - 12 %    Eosinophils % 2 1 - 4 %    Basophils 1 0 - 2 %    Immature Granulocytes 0 0 %    Segs Absolute 2.25 1.50 - 8.10 k/uL    Absolute Lymph # 3.17 1.10 - 3.70 k/uL    Absolute Mono # 0.35 0.10 - 1.20 k/uL Absolute Eos # 0.09 0.00 - 0.44 k/uL    Basophils Absolute 0.03 0.00 - 0.20 k/uL    Absolute Immature Granulocyte <0.03 0.00 - 0.30 k/uL   Basic Metabolic Panel w/ Reflex to MG   Result Value Ref Range    Glucose 109 (H) 70 - 99 mg/dL    BUN 15 6 - 20 mg/dL    CREATININE 0.64 0.50 - 0.90 mg/dL    Calcium 9.0 8.6 - 10.4 mg/dL    Sodium 137 135 - 144 mmol/L    Potassium 3.7 3.7 - 5.3 mmol/L    Chloride 104 98 - 107 mmol/L    CO2 23 20 - 31 mmol/L    Anion Gap 10 9 - 17 mmol/L    GFR Non-African American >60 >60 mL/min    GFR African American >60 >60 mL/min    GFR Comment         Troponin   Result Value Ref Range    Troponin, High Sensitivity <6 0 - 14 ng/L   Brain Natriuretic Peptide   Result Value Ref Range    Pro-BNP 32 <300 pg/mL   HCG Qualitative, Serum   Result Value Ref Range    hCG Qual NEGATIVE NEGATIVE   D-Dimer, Quantitative   Result Value Ref Range    D-Dimer, Quant 0.29 mg/L FEU   Troponin   Result Value Ref Range    Troponin, High Sensitivity <6 0 - 14 ng/L   HCG Qualitative, Serum   Result Value Ref Range    hCG Qual NEGATIVE NEGATIVE   EKG 12 Lead   Result Value Ref Range    Ventricular Rate 88 BPM    Atrial Rate 88 BPM    P-R Interval 156 ms    QRS Duration 92 ms    Q-T Interval 382 ms    QTc Calculation (Bazett) 462 ms    P Axis 40 degrees    R Axis 15 degrees    T Axis 8 degrees     XR CERVICAL SPINE (2-3 VIEWS)    Result Date: 3/14/2022  EXAMINATION: 3 XRAY VIEWS OF THE CERVICAL SPINE 3/14/2022 1:18 am COMPARISON: None. HISTORY: ORDERING SYSTEM PROVIDED HISTORY: Cervical spine pain TECHNOLOGIST PROVIDED HISTORY: Ap/lateral Cervical spine pain FINDINGS: All 7 cervical vertebrae are visualized and appear normal in height and alignment. There is no evidence of prevertebral soft tissue edema or fracture. The base of the odontoid appears intact. Mild degenerate disc space disease at C7-T1 and mild loss of height at C4-C5     Mild degenerate changes C7-T1 C4-C5.   Otherwise acute fracture traumatic malalignment     XR WRIST RIGHT (MIN 3 VIEWS)    Result Date: 3/14/2022  EXAMINATION: 3 XRAY VIEWS OF THE RIGHT WRIST 3/14/2022 1:19 am COMPARISON: None. HISTORY: ORDERING SYSTEM PROVIDED HISTORY: wrist pain TECHNOLOGIST PROVIDED HISTORY: wrist pain Reason for Exam: fingers numb,poss carpal tunnel FINDINGS: No fracture, dislocation, or focal osseous lesion is noted. No significant soft tissue abnormality seen. No fracture or dislocation. XR CHEST PORTABLE    Result Date: 3/13/2022  EXAMINATION: ONE XRAY VIEW OF THE CHEST 3/13/2022 11:04 pm COMPARISON: 08/21/2015. HISTORY: ORDERING SYSTEM PROVIDED HISTORY: chest pain TECHNOLOGIST PROVIDED HISTORY: chest pain Reason for Exam: upr FINDINGS: The cardiac silhouette and mediastinal contours are normal.  The lungs are clear. No parenchymal lung infiltrate. No pleural effusion. The visualized osseous structures are unremarkable. 1. No acute cardiopulmonary disease. NM Cardiac Stress Test Nuclear Imaging    Result Date: 3/14/2022  EXAMINATION: MYOCARDIAL PERFUSION IMAGING 3/14/2022 11:33 am TECHNIQUE: For the rest study, 14 mCi of Tc 99 labeled sestamibi were injected. SPECT images were acquired. Under cardiology supervision, 0.4mg Lucetta Cast was infused. After pharmacologic stress, 40 mCi of Tc 99 labeled sestamibi were injected. SPECT images with ECG gating were acquired. COMPARISON: 20 September 2009, normal study HISTORY: ORDERING SYSTEM PROVIDED HISTORY: Chest pain TECHNOLOGIST PROVIDED HISTORY: Reason for Exam: Chest pain Procedure Type->Rx chest pain Is the patient pregnant?->No Reason for Exam: chest pain, essential hypertension, moderate aortic, ST changes secondary to LVH FINDINGS: Images interpreted utilizing [employer]. There is normal distribution of radiotracer throughout the myocardium without evidence for a significant reversible or fixed perfusion defect.  Perfusion scores are visually adjusted to account for artifact. Summed stress score:  0 Summed rest score:  0 Summed reversibility score:  0 Function: End diastolic volume:  351CN Left ventricular ejection fraction:  60% Wall motion abnormalities:  None. TID score:  1.03 (scores greater than 1.39 are considered elevated for Lexiscan stress with Tc99m)     Perfusion:  No significant perfusion defects at stress or rest. Function:  Normal Risk stratification: LOW Notes concerning risk stratification: Risk stratification incorporates both clinical history and some testing results. Final risk determination is the responsibility of the ordering provider as other patient information and test results may increase or decrease the risk assessment reported for this examination. Risk stratification criteria are adapted from \"Noninvasive Risk Stratification\" criteria from [employer]. Al, ACC/AATS/AHA/ASE/ASNC/SCAI/SCCT/STS 2017 Appropriate Use Criteria For Coronary Revascularization in Patients With Stable Ischemic Heart Disease [facility] Volume 69, Issue 17, May 2017 High risk (>3% annual death or MI) 1. Severe resting LV dysfunction (LVEF <35%) not readily explained by non coronary causes 2. Resting perfusion abnormalities greater than 10% of the myocardium in patients without prior history or evidence of MI3. Stress-induced perfusion abnormalities encumbering greater than or equal to 10% myocardium or stress segmental scores indicating multiple vascular territories with abnormalities 4. Stress-induced LV dilatation (TID ratio greater than 1.19 for exercise and greater than 1.39 for regadenoson) Intermediate risk (1% to 3% annual death or MI) 1. Mild/moderate resting LV dysfunction (LVEF 35% to 49%) not readily explained by non coronary causes. 2. Resting perfusion abnormalities in 5%-9.9% of the myocardium in patients without a history or prior evidence of MI 3.  Stress-induced perfusion abnormality encumbering 5%-9.9% of the myocardium or stress segmental scores indicating 1 vascular territory with abnormalities but without LV dilation 4. Small wall motion abnormality involving 1-2 segments and only 1 coronary bed. Low Risk (Less than 1% annual death or MI) 1. Normal or small myocardial perfusion defect at rest or with stress encumbering less than 5% of the myocardium. Physical Exam:    General appearance - NAD, AOx 3   Lungs -CTAB, no R/R/R  Heart - RRR, no M/R/G  Abdomen - Soft, NT/ND  Neurological:  MAEx4, No focal motor deficit, sensory loss  Extremities - Cap refil <2 sec in all ext., no edema  Skin -warm, dry      Hospital Course:  Clinical course has improved, labs and imaging reviewed. Dejan Gordon originally presented to the hospital on 3/13/2022  9:49 PM with atypical chest pain. At that time it was determined that She required further observation and ACS risk stratification with Cardiology. Stress and ECHO low risk. Labs and imaging were followed daily. Imaging results as above. She is medically stable to be discharged. Disposition: Home    Patient stated that they will not drive themselves home from the hospital if they have gotten pain killers/ narcotics earlier that day and that they will arrange for transportation on their own or work with the  for a ride. Patient counseled NOT to drive while under the influence of narcotics/ pain killers. Condition: Good    Patient stable and ready for discharge home. I have discussed plan of care with patient and they are in understanding. They were instructed to read discharge paperwork. All of their questions and concerns were addressed. Time Spent: 0 day      --  Dwaine Ken MD  Emergency Medicine Attending Physician    This dictation was generated by voice recognition computer software. Although all attempts are made to edit the dictation for accuracy, there may be errors in the transcription that are not intended.

## 2022-03-14 NOTE — CONSULTS
Orthopaedic Surgery Consult  (Dr. Iram Lopez)      CC/Reason for consult:  Bilateral carpal tunnel syndrome    HPI:      The patient is a 54 y.o. right hand-dominant female who presents to the ED for chest pain, but is also complaining about chronic bilateral wrist pain. Patient denies any previous trauma. Orthopedics was consulted for evaluation of her bilateral wrists. States her right wrist pain is worse than the left. Patient states that she was diagnosed with carpal tunnel syndrome at her PCP. 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 She has not seen orthopedics for this issue yet. She states that carpal tunnel syndrome is impeding her daily activities of life, which includes eating, showering, and working. Patient admits to numbness and tingling to the wrist, and states that her thumb sometimes gets stuck when the pain is so severe. Patient has tried lidocaine patches, over-the-counter pain medications, and night splints with no relief. Her right carpal tunnel symptoms is worse than her left. Patient also admits to neck pain, but states that the pain radiates from her wrist to her neck. The chief complaint of the patient, which prompted her to be present at the emergency department was her acute onset of chest pain. Patient has had no prior orthopedic surgeries or has never had a fracture. Patient's past medical history includes hypertension, bipolar disorder, and bilateral carpal tunnel syndrome.     Past Medical History:    Past Medical History:   Diagnosis Date    Bipolar disorder ([facility] [address]     Depression     Gastritis     Hypertension     IBS (irritable bowel syndrome)      Past Surgical History:    Past Surgical History:   Procedure Laterality Date    ABDOMEN SURGERY      APPENDECTOMY       SECTION      x2    HYSTERECTOMY      TONSILLECTOMY      TUBAL LIGATION       Medications Prior to Admission:   Prior to Admission medications    Medication Sig Start Date End Date Taking?  Authorizing Provider   folic acid (FOLVITE) 1 MG tablet TAKE 1 TABLET BY MOUTH DAILY 3/11/22   Boy Brenner MD   VENTOLIN  (90 Base) MCG/ACT inhaler INHALE TWO (2) PUFFS BY MOUTH FOUR TIMES A DAY AS NEEDED FOR WHEEZING 3/11/22   Trudy Mckeon MD   omeprazole (PRILOSEC) 20 MG delayed release capsule TAKE 1 CAPSULE BY MOUTH DAILY WITH BREAKFAST 3/11/22   Boy Brenner MD   diclofenac sodium (VOLTAREN) 1 % GEL APPLY 2 GRAMS TOPICALLY FOUR TIMES A DAY 3/11/22   Jessi Grove MD   meclizine (ANTIVERT) 12.5 MG tablet TAKE 1 TABLET BY MOUTH THREE TIMES A DAY AS NEEDED FOR DIZZINESS OR NAUSEA. 3/11/22   Eusebia Levin MD   colestipol (COLESTID) 1 g tablet TAKE 1 TABLET BY MOUTH IN THE MORNING 3/11/22   Aiden Link MD   cloNIDine (CATAPRES) 0.3 MG tablet TAKE ONE (1) TABLET BY MOUTH TWICE DAILY 3/11/22   Aiden Link MD   ACETAMINOPHEN EXTRA STRENGTH 500 MG tablet TAKE 1 TABLET BY MOUTH FOUR TIMES DAILY AS NEEDED FOR PAIN *F/U:MARTINEZ Paez Record [phone] R Margarita [address] [address]* 2/4/22   Hermes Schroeder MD   polyethylene glycol (GLYCOLAX) 17 GM/SCOOP powder DISSOLVE 17 GRAMS (1 CAPFUL) IN 8 OUNCES OF LIQUID AND DRINK BY MOUTH ONCE DAILY 2/4/22   Eusebia Levin MD   mineral oil liquid TAKE 30 ML BY MOUTH DAILY AS NEEDED FOR CONSTIPATION 12/1/21   Swathi Garcia MD   hydroCHLOROthiazide (HYDRODIURIL) 25 MG tablet TAKE 1 TABLET BY MOUTH DAILY 11/22/21   Audley Ganser, MD   losartan (COZAAR) 25 MG tablet Take 1 tablet by mouth daily 11/22/21   Audley Ganser, MD   Multiple Vitamins-Minerals (MULTIVITAMIN WITH MINERALS) tablet Take 1 tablet by mouth daily 11/22/21   Audley Ganser, MD   amLODIPine (NORVASC) 5 MG tablet Take 2 tablets by mouth daily 11/22/21   Audley Ganser, MD   loratadine (CLARITIN) 10 MG tablet TAKE 1 TABLET BY MOUTH NIGHTLY AS NEEDED FOR SEASONAL ALLERGIES 7/6/21   Ric Andrade MD   Lidocaine, Anorectal, (HEMORRHOIDAL RELIEF) 5 % CREA Apply 1 Applicatorful topically daily 3/10/21   Italia Reeves MD   gabapentin (NEURONTIN) 100 MG capsule Take 3 capsules by mouth 3 times daily for 30 days. 12/21/20 1/20/21  Rimma Martinez MD   melatonin (RA MELATONIN) 3 MG TABS tablet Take 1 tablet by mouth daily 11/3/20   Aftab Farias MD   Incontinence Supply Disposable ([facility] INCONTINENCE PADS) MISC 1 each by Does not apply route daily as needed (for incontinence as needed) 11/3/20   Aftab Farias MD   Blood Pressure KIT 1 each by Does not apply route as needed (take as needed for blood pressure monitoring at home) 11/3/20   Aftab Farias MD   D3 SUPER STRENGTH 50 MCG (2000 UT) CAPS TAKE (1) CAPSULE BY MOUTH DAILY 10/23/20   Aftab Farias MD   ondansetron (ZOFRAN-ODT) 4 MG disintegrating tablet Take 1 tablet by mouth 3 times daily as needed for Nausea or Vomiting 9/17/20   Aftab Farias MD   butenafine (LOTRIMIN ULTRA) 1 % CREA Apply 1 Tube topically as needed (apply to affected area as needed) 9/17/20   Aftab Farias MD   estradiol (ESTRACE) 1 MG tablet Take 1 mg by mouth daily 7/9/19   Historical Provider, MD   sertraline (ZOLOFT) 100 MG tablet Take 100 mg by mouth    Historical Provider, MD   prazosin (MINIPRESS) 2 MG capsule  7/6/18   Historical Provider, MD   traZODone (DESYREL) 100 MG tablet Take 200 mg by mouth nightly as needed for Sleep    Historical Provider, MD   Sodium Phosphates (FLEET) 7-19 GM/118ML Place 1 enema rectally daily as needed (constipation).  3/16/15   Sarah Vance DO     Allergies:    Motrin [ibuprofen]  Social History:   Social History     Socioeconomic History    Marital status: Single     Spouse name: None    Number of children: None    Years of education: None    Highest education level: None   Occupational History    None   Tobacco Use    Smoking status: Never Smoker    Smokeless tobacco: Never Used   Substance and Sexual Activity    Alcohol use: Yes     Comment: rare    Drug use: Yes     Types: Marijuana [place])    Sexual activity: Not Currently   Other Topics Concern    None   Social History Narrative    None     Social Determinants of Health     Financial Resource Strain:     Difficulty of Paying Living Expenses: Not on file   Food Insecurity:     Worried About Running Out of Food in the Last Year: Not on file    Dionisio of Food in the Last Year: Not on file   Transportation Needs:     Lack of Transportation (Medical): Not on file    Lack of Transportation (Non-Medical): Not on file   Physical Activity:     Days of Exercise per Week: Not on file    Minutes of Exercise per Session: Not on file   Stress:     Feeling of Stress : Not on file   Social Connections:     Frequency of Communication with Friends and Family: Not on file    Frequency of Social Gatherings with Friends and Family: Not on file    Attends Jewish Services: Not on file    Active Member of [address] [place] [address] [employer] or Organizations: Not on file    Attends Club or Organization Meetings: Not on file    Marital Status: Not on file   Intimate Partner Violence:     Fear of Current or Ex-Partner: Not on file    Emotionally Abused: Not on file    Physically Abused: Not on file    Sexually Abused: Not on file   Housing Stability:     Unable to Pay for Housing in the Last Year: Not on file    Number of [place] in the Last Year: Not on file    Unstable Housing in the Last Year: Not on file     Family History:  History reviewed. No pertinent family history. ROS:   Constitutional: Negative for fever and chills. Respiratory: Negative for cough. Cardiovascular: Positive for chest pain. Musculoskeletal: Positive for bilateral wrist pain. Skin: Negative for itching and rash. Neurological: Positive for numbness, tingling, weakness of the bilateral hands    PE:  Blood pressure 133/81, pulse 72, temperature 98.1 °F (36.7 °C), temperature source Oral, resp. rate 14, SpO2 99 %, not currently breastfeeding.     Gen: Alert and oriented, NAD, cooperative. Head: Normocephalic, atraumatic. Cardiovascular: Regular rate. Respiratory: Chest symmetric, no accessory muscle use. Cervical Spine: Tender palpation over the posterior cervical spinal musculature. Full cervical spine range of motion. Pain radiating down the length of her spine with chin to chest motion. Pain elicited during cervical compression test but no radicular symptoms. RUE: Skin intact. No ecchymoses, abrasion, deformity, or lacerations. Tenderness to palpation about the wrist, which radiates distally throughout all her fingers, and radiates to her elbow and neck proximally. Right wrist worse than left wrist pain on palpation. No bony crepitus upon palpation. Compartments soft and easily compressible. Positive Vivian's compression test, positive Tinel's sign, positive Phalen test. Ulnar/median/AIN/PIN/radial motor intact. Axillary/MCN/median/ulnar/radial nerves SILT with dysesthesias diffusely about the hand. Radial pulse 2+ with BCR.    LUE: Skin intact. No ecchymoses, abrasion, deformity, or lacerations. Tenderness to palpation about the wrist, which radiates distally throughout all her fingers, and radiates to her elbow and neck proximally. No bony crepitus upon palpation. Compartments soft and easily compressible. Positive Vivian's compression test, positive Tinel's sign, positive Phalen test. Ulnar/median/AIN/PIN/radial motor intact. Axillary/MCN/median/ulnar/radial nerves SILT with dysesthesias diffusely about the hand. Radial pulse 2+ with BCR. Labs:  Recent Labs     03/13/22  2217   WBC 5.9   HGB 12.0   HCT 37.0         K 3.7   BUN 15   CREATININE 0.64   GLUCOSE 109*        Imaging:   X-ray imaging of the right wrist was reviewed which demonstrate no acute fractures, dislocations, or subluxations.     Assessment/Plan: 54 y.o. female being seen for:    -Bilateral carpal tunnel syndrome vs cervical radiculopathy    -No acute orthopaedic surgery intervention indicated  -Recommend outpatient EMG for evaluation of CTS vs cervical radiculopathy  -WB status: Weightbearing as tolerated bilateral upper extremities  -Continue use of anti-inflammatory medications and night splints  -Patient likely to be admitted for continued evaluation of chest pain  -Diet: Okay for diet per orthopedic standpoint  -DVT/ABX/pain regimen per primary discretion  -Ice and elevate extremity for pain and swelling  -Okay to discharge per orthopedic standpoint  -Follow up with Dr. Britni Hankins within 7 to 10 days  -Please contact ortho with any questions    Vanessa Posadas DO  Resident Physician, PGY-1   Orthopaedic Surgery  12:58 AM 3/14/2022    This note is created with the assistance of a speech recognition program. While intending to generate a document that actually reflects the content of the visit, the document can still have some errors including those of syntax and sound a like substitutions which may escape proof reading. In such instances, actual meaning can be extrapolated by contextual diversion. PGY-3 Addendum    Patient seen and examined. Agree with above assessment by Dr. Juan Pablo Palafox. Patient presented to the ED overnight due to chest pain. She also complained of bilateral wrist plain and patient states she was diagnosed with carpal tunnel syndrome to her bilateral wrist by her PCP. We were consulted for evaluation of her bilateral wrist pain. Agree with above assessment and physical exam.  No acute/urgent orthopedic surgical intervention. Patient may follow-up outpatient with Dr. Britni Hankins. Likely will need EMG studies to evaluate for proximal nerve involvement. Activity as tolerated to bilateral extremities. She can follow-up with Dr. Britni Hankins in 7 to 10 days.     Clifford Almeida DO  Orthopedic Surgery Resident, PGY-3  [facility]

## 2022-03-14 NOTE — ED PROVIDER NOTES
[facility]     Emergency Department     Faculty Attestation    I performed a history and physical examination of the patient and discussed management with the resident. I reviewed the residents note and agree with the documented findings and plan of care. Any areas of disagreement are noted on the chart. I was personally present for the key portions of any procedures. I have documented in the chart those procedures where I was not present during the key portions. I have reviewed the emergency nurses triage note. I agree with the chief complaint, past medical history, past surgical history, allergies, medications, social and family history as documented unless otherwise noted below. For Physician Assistant/ Nurse Practitioner cases/documentation I have personally evaluated this patient and have completed at least one if not all key elements of the E/M (history, physical exam, and MDM). Additional findings are as noted. I have personally seen and evaluated the patient. I find the patient's history and physical exam are consistent with the NP/PA documentation. I agree with the care provided, treatment rendered, disposition and follow-up plan. This patient was evaluated in the Emergency Department for symptoms described in the history of present illness. The patient was evaluated in the context of the global COVID-19 pandemic, which necessitated consideration that the patient might be at risk for infection with the SARS-CoV-2 virus that causes COVID-19. Institutional protocols and algorithms that pertain to the evaluation of patients at risk for COVID-19 are in a state of rapid change based on information released by regulatory bodies including the CDC and federal and state organizations. These policies and algorithms were followed during the patient's care in the ED. 44-year-old female presenting with chest pain and arm pain.   Chest pain is in the middle of her chest, nonradiating. Her wrists have been bothering her for several months, and she was diagnosed with carpal tunnel syndrome by her PCP. She was put into night splints and given lidocaine patches. The pain and weakness has progressed to where she can no longer clean herself after bowel movement. She has been forced to move out of her home, and has been given a deadline of the end of the month. She has been unable to work for the last several days due to worsening pain. No falls or trauma. Exam:  General: Laying on the bed and awake, alert  CV: normal rate and regular rhythm  Lungs: Breathing comfortably on room air with no tachypnea, hypoxia, or increased work of breathing  MSK: Tenderness over the carpal tunnel, pain radiating up into the elbows. 2+ radial pulses bilaterally. Right arm more tender than left arm, however both are painful to palpation. Hand weakness in the right hand, decreased  strength compared to the left.     Plan:  Cardiac work-up including CBC, electrolytes, troponin x2, and D-dimer  Consult orthopedics for management of carpal tunnel syndrome causing objective weakness  Anticipate admission, signed out to overnight physician pending remainder of lab work-up        Syed Paz MD   Attending Emergency  Physician    (Please note that portions of this note were completed with a voice recognition program. Efforts were made to edit the dictations but occasionally words are mis-transcribed.)             Syed Paz MD  03/13/22 [id number]

## 2022-03-14 NOTE — ED PROVIDER NOTES
GRAMS TOPICALLY FOUR TIMES A DAY 3/11/22   Jessi Mata MD   meclizine (ANTIVERT) 12.5 MG tablet TAKE 1 TABLET BY MOUTH THREE TIMES A DAY AS NEEDED FOR DIZZINESS OR NAUSEA. 3/11/22   Zenia Ventura MD   colestipol (COLESTID) 1 g tablet TAKE 1 TABLET BY MOUTH IN THE MORNING 3/11/22   Avni Jones MD   cloNIDine (CATAPRES) 0.3 MG tablet TAKE ONE (1) TABLET BY MOUTH TWICE DAILY 3/11/22   Avni Jones MD   ACETAMINOPHEN EXTRA STRENGTH 500 MG tablet TAKE 1 TABLET BY MOUTH FOUR TIMES DAILY AS NEEDED FOR PAIN *F/U:MARTINEZ Nguyen [phone] R Margarita [address] [place] [address]* 2/4/22   Hermes Schroeder MD   polyethylene glycol (GLYCOLAX) 17 GM/SCOOP powder DISSOLVE 17 GRAMS (1 CAPFUL) IN 8 OUNCES OF LIQUID AND DRINK BY MOUTH ONCE DAILY 2/4/22   Zenia Ventura MD   mineral oil liquid TAKE 30 ML BY MOUTH DAILY AS NEEDED FOR CONSTIPATION 12/1/21   Franki Arndt MD   hydroCHLOROthiazide (HYDRODIURIL) 25 MG tablet TAKE 1 TABLET BY MOUTH DAILY 11/22/21   Krysten Baker MD   losartan (COZAAR) 25 MG tablet Take 1 tablet by mouth daily 11/22/21   Krysten Baker MD   Multiple Vitamins-Minerals (MULTIVITAMIN WITH MINERALS) tablet Take 1 tablet by mouth daily 11/22/21   Krysten Baker MD   amLODIPine (NORVASC) 5 MG tablet Take 2 tablets by mouth daily 11/22/21   Krysten Baker MD   loratadine (CLARITIN) 10 MG tablet TAKE 1 TABLET BY MOUTH NIGHTLY AS NEEDED FOR SEASONAL ALLERGIES 7/6/21   Liberty Uribe MD   Lidocaine, Anorectal, (HEMORRHOIDAL RELIEF) 5 % CREA Apply 1 Applicatorful topically daily 3/10/21   Italia Leslie MD   gabapentin (NEURONTIN) 100 MG capsule Take 3 capsules by mouth 3 times daily for 30 days.  12/21/20 1/20/21  Bret Elaine MD   melatonin (RA MELATONIN) 3 MG TABS tablet Take 1 tablet by mouth daily 11/3/20   Eryn Green MD   Incontinence Supply Disposable ([facility] INCONTINENCE PADS) MISC 1 each by Does not apply route daily as needed (for incontinence as needed) 11/3/20 Hermenia Seip, MD   Blood Pressure KIT 1 each by Does not apply route as needed (take as needed for blood pressure monitoring at home) 11/3/20   Hermenia Seip, MD   D3 SUPER STRENGTH 50 MCG (2000 UT) CAPS TAKE (1) CAPSULE BY MOUTH DAILY 10/23/20   Hermenia Seip, MD   ondansetron (ZOFRAN-ODT) 4 MG disintegrating tablet Take 1 tablet by mouth 3 times daily as needed for Nausea or Vomiting 9/17/20   Hermenia Seip, MD   butenafine (LOTRIMIN ULTRA) 1 % CREA Apply 1 Tube topically as needed (apply to affected area as needed) 9/17/20   Hermenia Seip, MD   estradiol (ESTRACE) 1 MG tablet Take 1 mg by mouth daily 7/9/19   Historical Provider, MD   sertraline (ZOLOFT) 100 MG tablet Take 100 mg by mouth    Historical Provider, MD   prazosin (MINIPRESS) 2 MG capsule  7/6/18   Historical Provider, MD   traZODone (DESYREL) 100 MG tablet Take 200 mg by mouth nightly as needed for Sleep    Historical Provider, MD   Sodium Phosphates (FLEET) 7-19 GM/118ML Place 1 enema rectally daily as needed (constipation). 3/16/15   Rendall Ohs, DO       REVIEW OFSYSTEMS    (2-9 systems for level 4, 10 or more for level 5)      Review of Systems   Constitutional: Negative for diaphoresis and fever. HENT: Negative for congestion and nosebleeds. Eyes: Negative for visual disturbance. Respiratory: Negative for shortness of breath. Cardiovascular: Positive for chest pain. Gastrointestinal: Negative for abdominal pain, nausea and vomiting. Endocrine: Negative for polyuria. Genitourinary: Negative for dysuria. Musculoskeletal: Negative for back pain. R hand/ wrist pain   Skin: Negative for wound. Neurological: Negative for headaches. Psychiatric/Behavioral: Negative for confusion.        PHYSICAL EXAM   (up to 7 for level 4, 8 or more forlevel 5)      ED TRIAGE VITALS BP: (!) 131/93, Temp: 98.1 °F (36.7 °C), Pulse: 90, Resp: 18, SpO2: 99 %    Vitals:    03/13/22 2217 03/13/22 2316 03/14/22 0000 03/14/22 0200   BP: 128/84 128/83 133/81 125/81   Pulse: 76 86 72 69   Resp: 18 18 14 20   Temp:       TempSrc:       SpO2: 99% 100% 99% 99%       Physical Exam  HENT:      Head: Normocephalic. Nose: Nose normal.   Cardiovascular:      Pulses: Normal pulses. Pulmonary:      Effort: Pulmonary effort is normal.   Chest:      Chest wall: Tenderness present. Abdominal:      Palpations: Abdomen is soft. Musculoskeletal:         General: Swelling and tenderness present. Right wrist: Swelling and tenderness present. Decreased range of motion. Right hand: Swelling present. Cervical back: Normal range of motion. Neurological:      Mental Status: She is alert.          DIFFERENTIAL  DIAGNOSIS     PLAN (LABS / IMAGING / EKG):  Orders Placed This Encounter   Procedures    COVID-19, Rapid    XR CHEST PORTABLE    XR WRIST RIGHT (MIN 3 VIEWS)    XR CERVICAL SPINE (2-3 VIEWS)    CBC with Auto Differential    Basic Metabolic Panel w/ Reflex to MG    Troponin    Brain Natriuretic Peptide    HCG Qualitative, Serum    D-Dimer, Quantitative    Troponin    Telemetry monitoring - 24 hour duration    Inpatient consult to Orthopedic Surgery    [facility]patient [facility] to [facility]    EKG 12 Lead    Place in Observation Service       MEDICATIONS ORDERED:  Orders Placed This Encounter   Medications    famotidine (PEPCID) injection 20 mg    morphine injection 4 mg       DDX:     Myocardial ischemia, pulmonary embolism, pneumonia, carpal tunnel, trapped nerve, fracture of the wrist, inability to perform daily activities of living    Initial MDM/Plan: 54 y.o. female who presents with right hand and chest pain    Troponins less than 6, EKG without ST segment elevations, chest x-ray without pneumonia or cardiomegaly  Does have risk factors, heart score of 4  Is currently on estradiol unable to [facility] out, D-dimer is less than 0.5, less likely to be PE  Stable overall, symptomatic treatment  proBNP 32, not pregnant at this time  Discussed case with orthopedic surgery as patient has severe carpal tunnel from history, assessed by orthopedic surgery no intervention outpatient follow  I am concerned about a possible upper extremity DVT, will perform a ultrasound as patient is likely to be admitted  Discussed case with family medicine declined admission at this time syndrome recommend ETU for cardiac evaluation  X-rays negative for traumatic fracture    Disposition:  Patient will be admitted to the ETU unit for cardiac evaluation and ultrasound of the right upper extremity for DVTs      DIAGNOSTIC RESULTS / EMERGENCYDEPARTMENT COURSE / MDM     LABS:  Results for orders placed or performed during the hospital encounter of 03/13/22   COVID-19, Rapid    Specimen: Nasopharyngeal Swab   Result Value Ref Range    Specimen Description . NASOPHARYNGEAL SWAB     SARS-CoV-2, Rapid Not Detected Not Detected   CBC with Auto Differential   Result Value Ref Range    WBC 5.9 3.5 - 11.3 k/uL    RBC 4.12 3.95 - 5.11 m/uL    Hemoglobin 12.0 11.9 - 15.1 g/dL    Hematocrit 37.0 36.3 - 47.1 %    MCV 89.8 82.6 - 102.9 fL    MCH 29.1 25.2 - 33.5 pg    MCHC 32.4 28.4 - 34.8 g/dL    RDW 13.9 11.8 - 14.4 %    Platelets 988 957 - 655 k/uL    MPV 10.2 8.1 - 13.5 fL    NRBC Automated 0.0 0.0 per 100 WBC    Seg Neutrophils 38 36 - 65 %    Lymphocytes 53 (H) 24 - 43 %    Monocytes 6 3 - 12 %    Eosinophils % 2 1 - 4 %    Basophils 1 0 - 2 %    Immature Granulocytes 0 0 %    Segs Absolute 2.25 1.50 - 8.10 k/uL    Absolute Lymph # 3.17 1.10 - 3.70 k/uL    Absolute Mono # 0.35 0.10 - 1.20 k/uL    Absolute Eos # 0.09 0.00 - 0.44 k/uL    Basophils Absolute 0.03 0.00 - 0.20 k/uL    Absolute Immature Granulocyte <0.03 0.00 - 0.30 k/uL   Basic Metabolic Panel w/ Reflex to MG   Result Value Ref Range    Glucose 109 (H) 70 - 99 mg/dL    BUN 15 6 - 20 mg/dL    CREATININE 0.64 0.50 - 0.90 mg/dL    Calcium 9.0 8.6 - 10.4 mg/dL    Sodium 137 135 - 144 mmol/L    Potassium 3.7 3.7 - 5.3 mmol/L    Chloride 104 98 - 107 mmol/L    CO2 23 20 - 31 mmol/L    Anion Gap 10 9 - 17 mmol/L    GFR Non-African American >60 >60 mL/min    GFR African American >60 >60 mL/min    GFR Comment         Troponin   Result Value Ref Range    Troponin, High Sensitivity <6 0 - 14 ng/L   Brain Natriuretic Peptide   Result Value Ref Range    Pro-BNP 32 <300 pg/mL   HCG Qualitative, Serum   Result Value Ref Range    hCG Qual NEGATIVE NEGATIVE   D-Dimer, Quantitative   Result Value Ref Range    D-Dimer, Quant 0.29 mg/L FEU   Troponin   Result Value Ref Range    Troponin, High Sensitivity <6 0 - 14 ng/L       RADIOLOGY:  XR WRIST RIGHT (MIN 3 VIEWS)   Final Result   No fracture or dislocation. XR CERVICAL SPINE (2-3 VIEWS)   Final Result   Mild degenerate changes C7-T1 C4-C5. Otherwise acute fracture traumatic   malalignment         XR CHEST PORTABLE   Final Result   1. No acute cardiopulmonary disease. EKG  No ST segment ovation or depression seen on EKG    All EKG's are interpreted by the Emergency Department Physicianwho either signs or Co-signs this chart in the absence of a cardiologist.    EMERGENCY DEPARTMENT COURSE:  ED Course as of 03/14/22 0242   Sun Mar 13, 2022   2330 Troponin, High Sensitivity: <6 [PS]   2330 Pro-BNP: 32 [PS]   2330 hCG Qual: NEGATIVE [PS]   9869 D-Dimer, Quant: 0.29 [PS]   Mon Mar 14, 2022   0037 D/w ortho   Recommend xray R wrist  No inpatient intervention for carpel tunnel  [PS]   0130 Discussed with family medicine recommend ED observation [PS]   [address] to admission for cardiology evaluation along with PT OT [PS]      ED Course User Index  [PS] Nayeli Miranda MD          PROCEDURES:  None    CONSULTS:  IP CONSULT TO ORTHOPEDIC SURGERY  IP CONSULT TO FAMILY MEDICINE  IP CONSULT TO CARDIOLOGY    CRITICAL CARE:  Please see attending note    FINAL IMPRESSION      1.  Chest pain, unspecified type          DISPOSITION / [address] Admitted 03/14/2022 01:34:10 AM       PATIENT REFERRED TO:  No follow-up provider specified.     DISCHARGE MEDICATIONS:  Current Discharge Medication List          Sayda Dow MD  Emergency Medicine Resident    (Please note that portions of this note were completed with a voice recognition program.Efforts were made to edit the dictations but occasionally words are mis-transcribed.)       Sayda Dow MD  Resident  03/14/22 0233

## 2022-03-14 NOTE — ED PROVIDER NOTES
FACULTY SIGN-OUT  ADDENDUM       Patient: Lori Ferguson   MRN: [id number]  PCP:  Eryn Green MD  Attestation  I was available and discussed any additional care issues that arose and coordinated the management plans with the resident(s) caring for the patient during my duty period. Any areas of disagreement with resident's documentation of care or procedures are noted on the chart. I was personally present for the key portions of any/all procedures during my duty period. I have documented in the chart those procedures where I was not present during the key portions. The patient's initial evaluation and plan have been discussed with the prior provider who initially evaluated the patient. Pertinent Comments: The patient is a 54 y.o. female taken in signout with being in [facility] patient with chest pain that given risk factors and heart score will necessitate admission for further testing.     Also has what appears to be relatively debilitating bilateral carpal tunnel syndrome and unable to accomplish activities of daily living  We are awaiting admission    ED COURSE      The patient was given the following medications:  Orders Placed This Encounter   Medications    famotidine (PEPCID) injection 20 mg    morphine injection 4 mg       RECENT VITALS:   BP: 128/83  Pulse: 86  Resp: 18  Temp: 98.1 °F (36.7 °C) SpO2: 100 %    (Please note that portions of this note were completed with a voice recognition program.  Efforts were made to edit the dictations but occasionally words are mis-transcribed.)    MD Kaelyn Dugan  Attending Emergency Medicine Physician       Kevin Leslie MD  03/13/22 4244

## 2022-03-14 NOTE — H&P
[facility]  CDU / [facility] NOTE     Pt Name: Diogenes Corea  MRN: [id number]  [place] 1970  Date of evaluation: 3/14/22  Patient's PCP is :  Vida Gray MD    [address] [place] COMPLAINT       Chief Complaint   Patient presents with    Chest Pain         HISTORY OF PRESENT ILLNESS    Diogenes Corea is a 54 y.o. female who presents with chest tightness and pain over the last three days in the setting of life stressors and increased physical activity packing her house for a quick move. Acutely worsened yesterday which brought her to the ED. Not associated with nausea or vomiting, has baseline intermittent vertigo, not acutely worsened. Her carpal tunnel is also aggravated by the packing of boxes. Was seen by Ortho in the dept and will follow up next week. Placed in obs for cards eval this morning. Initial cardiac evaluation in ED reassuring, normal labs and imaging. REVIEW OF SYSTEMS       General ROS - No fevers, No malaise   Ophthalmic ROS - No discharge, No changes in vision  ENT ROS -  No sore throat, No rhinorrhea,   Respiratory ROS - no shortness of breath, no cough, no  wheezing  Cardiovascular ROS - as above  Gastrointestinal ROS - No abdominal pain, no nausea or vomiting, no change in bowel habits, no black or bloody stools  Genito-Urinary ROS - No dysuria, trouble voiding, or hematuria  Musculoskeletal ROS - No myalgias, No arthalgias  Neurological ROS - No headache, no dizziness/lightheadedness, No focal weakness, no loss of sensation  Dermatological ROS - No lesions, No rash     (PQRS) Advance directives on face sheet per hospital policy. No change unless specifically mentioned in chart    PAST MEDICAL HISTORY    has a past medical history of Bipolar disorder ([facility] [address] Depression, Gastritis, Hypertension, and IBS (irritable bowel syndrome).     I have reviewed the past medical history with the patient and it is pertinent to this complaint. SURGICAL HISTORY      has a past surgical history that includes Tonsillectomy; Appendectomy; Tubal ligation;  section; Hysterectomy; and Abdomen surgery. I have reviewed and agree with Surgical History entered and it is pertinent to this complaint. CURRENT MEDICATIONS     amLODIPine (NORVASC) tablet 10 mg, Daily  hydroCHLOROthiazide (HYDRODIURIL) tablet 25 mg, Daily  losartan (COZAAR) tablet 25 mg, Daily  sertraline (ZOLOFT) tablet 100 mg, Daily  traZODone (DESYREL) tablet 200 mg, Nightly PRN  sodium chloride flush 0.9 % injection 5-40 mL, 2 times per day  sodium chloride flush 0.9 % injection 5-40 mL, PRN  0.9 % sodium chloride infusion, PRN  enoxaparin (LOVENOX) injection 40 mg, Daily  acetaminophen (TYLENOL) tablet 650 mg, Q4H PRN  ondansetron (ZOFRAN-ODT) disintegrating tablet 4 mg, Q8H PRN   Or  ondansetron (ZOFRAN) injection 4 mg, Q6H PRN  HYDROcodone-acetaminophen (NORCO) 5-325 MG per tablet 1 tablet, Q4H PRN   Or  HYDROcodone-acetaminophen (NORCO) 5-325 MG per tablet 2 tablet, Q4H PRN  sodium chloride flush 0.9 % injection 10 mL, PRN  sodium chloride flush 0.9 % injection 10 mL, PRN        All medication charted and reviewed. ALLERGIES     is allergic to motrin [ibuprofen]. FAMILY HISTORY     She indicated that her mother is alive. She indicated that her father is . family history includes Diabetes in her mother. The patient denies any pertinent family history. I have reviewed and agree with the family history entered. I have reviewed the Family History and it is not significant to the case    SOCIAL HISTORY      reports that she has never smoked. She has never used smokeless tobacco. She reports current alcohol use. She reports current drug use. Drug: Marijuana Susan Luz). I have reviewed and agree with all Social.  There are no concerns for substance abuse/use. PHYSICAL EXAM     INITIAL VITALS:  weight is 209 lb (94.8 kg).  Her oral temperature is 98.6 °F (37 °C). Her blood pressure is 160/101 (abnormal) and her pulse is 75. Her respiration is 18 and oxygen saturation is 99%. CONSTITUTIONAL: AOx4, no apparent distress, appears stated age    HEAD: normocephalic, atraumatic   EYES: PERRLA, EOMI    ENT: moist mucous membranes, uvula midline   NECK: supple, symmetric   BACK: symmetric   LUNGS: clear to auscultation bilaterally   CARDIOVASCULAR: regular rate and rhythm, no murmurs, rubs or gallops   ABDOMEN: soft, non-tender, non-distended with normal active bowel sounds   NEUROLOGIC:  MAEx4, no focal sensory or motor deficits   MUSCULOSKELETAL: Swelling and tenderness in her b/l hands   SKIN: no rash or wounds       DIFFERENTIAL DIAGNOSIS/MDM:     Chest Pain:  DDX: Emergent: ACS/NSTEMI/STEMI/angina, arrhythmia, trauma, aortic dissection,  PE, PNA, pneumothroax, esophageal rupture, tamponade, Cocaine use  Nonemergent: pneumonia, pericarditis, GERD, MSK, Endocarditis, anxiety  Evaluated for: diaphoresis, present chest pain, tachypnea, BP both arms, heart sounds, JVD, tender chest wall, wheezing    DIAGNOSTIC RESULTS       RADIOLOGY:   I directly visualized the following  images and reviewed the radiologist interpretations:    XR CERVICAL SPINE (2-3 VIEWS)    Result Date: 3/14/2022  EXAMINATION: 3 XRAY VIEWS OF THE CERVICAL SPINE 3/14/2022 1:18 am COMPARISON: None. HISTORY: ORDERING SYSTEM PROVIDED HISTORY: Cervical spine pain TECHNOLOGIST PROVIDED HISTORY: Ap/lateral Cervical spine pain FINDINGS: All 7 cervical vertebrae are visualized and appear normal in height and alignment. There is no evidence of prevertebral soft tissue edema or fracture. The base of the odontoid appears intact. Mild degenerate disc space disease at C7-T1 and mild loss of height at C4-C5     Mild degenerate changes C7-T1 C4-C5.   Otherwise acute fracture traumatic malalignment     XR WRIST RIGHT (MIN 3 VIEWS)    Result Date: 3/14/2022  EXAMINATION: 3 XRAY VIEWS OF THE RIGHT WRIST 3/14/2022 1:19 am COMPARISON: None. HISTORY: ORDERING SYSTEM PROVIDED HISTORY: wrist pain TECHNOLOGIST PROVIDED HISTORY: wrist pain Reason for Exam: fingers numb,poss carpal tunnel FINDINGS: No fracture, dislocation, or focal osseous lesion is noted. No significant soft tissue abnormality seen. No fracture or dislocation. XR CHEST PORTABLE    Result Date: 3/13/2022  EXAMINATION: ONE XRAY VIEW OF THE CHEST 3/13/2022 11:04 pm COMPARISON: 08/21/2015. HISTORY: ORDERING SYSTEM PROVIDED HISTORY: chest pain TECHNOLOGIST PROVIDED HISTORY: chest pain Reason for Exam: upr FINDINGS: The cardiac silhouette and mediastinal contours are normal.  The lungs are clear. No parenchymal lung infiltrate. No pleural effusion. The visualized osseous structures are unremarkable. 1. No acute cardiopulmonary disease. NM Cardiac Stress Test Nuclear Imaging    Result Date: 3/14/2022  EXAMINATION: MYOCARDIAL PERFUSION IMAGING 3/14/2022 11:33 am TECHNIQUE: For the rest study, 14 mCi of Tc 99 labeled sestamibi were injected. SPECT images were acquired. Under cardiology supervision, 0.4mg Lobo Marquez was infused. After pharmacologic stress, 40 mCi of Tc 99 labeled sestamibi were injected. SPECT images with ECG gating were acquired. COMPARISON: 20 September 2009, normal study HISTORY: ORDERING SYSTEM PROVIDED HISTORY: Chest pain TECHNOLOGIST PROVIDED HISTORY: Reason for Exam: Chest pain Procedure Type->Rx chest pain Is the patient pregnant?->No Reason for Exam: chest pain, essential hypertension, moderate aortic, ST changes secondary to LVH FINDINGS: Images interpreted utilizing [employer] system and [employer]. There is normal distribution of radiotracer throughout the myocardium without evidence for a significant reversible or fixed perfusion defect. Perfusion scores are visually adjusted to account for artifact.  Summed stress score:  0 Summed rest score:  0 Summed reversibility score:  0 Function: End diastolic volume:  826VG Left ventricular ejection fraction:  60% Wall motion abnormalities:  None. TID score:  1.03 (scores greater than 1.39 are considered elevated for Lexiscan stress with Tc99m)     Perfusion:  No significant perfusion defects at stress or rest. Function:  Normal Risk stratification: LOW Notes concerning risk stratification: Risk stratification incorporates both clinical history and some testing results. Final risk determination is the responsibility of the ordering provider as other patient information and test results may increase or decrease the risk assessment reported for this examination. Risk stratification criteria are adapted from \"Noninvasive Risk Stratification\" criteria from Pulsarah Davis. Al, ACC/AATS/AHA/ASE/ASNC/SCAI/SCCT/STS 2017 Appropriate Use Criteria For Coronary Revascularization in Patients With Stable Ischemic Heart Disease [facility] Volume 69, Issue 17, May 2017 High risk (>3% annual death or MI) 1. Severe resting LV dysfunction (LVEF <35%) not readily explained by non coronary causes 2. Resting perfusion abnormalities greater than 10% of the myocardium in patients without prior history or evidence of MI3. Stress-induced perfusion abnormalities encumbering greater than or equal to 10% myocardium or stress segmental scores indicating multiple vascular territories with abnormalities 4. Stress-induced LV dilatation (TID ratio greater than 1.19 for exercise and greater than 1.39 for regadenoson) Intermediate risk (1% to 3% annual death or MI) 1. Mild/moderate resting LV dysfunction (LVEF 35% to 49%) not readily explained by non coronary causes. 2. Resting perfusion abnormalities in 5%-9.9% of the myocardium in patients without a history or prior evidence of MI 3. Stress-induced perfusion abnormality encumbering 5%-9.9% of the myocardium or stress segmental scores indicating 1 vascular territory with abnormalities but without LV dilation 4.  Small wall motion abnormality involving 1-2 segments and only 1 coronary bed. Low Risk (Less than 1% annual death or MI) 1. Normal or small myocardial perfusion defect at rest or with stress encumbering less than 5% of the myocardium. LABS:  I have reviewed and interpreted all available lab results. Labs Reviewed   CBC WITH AUTO DIFFERENTIAL - Abnormal; Notable for the following components:       Result Value    Lymphocytes 53 (*)     All other components within normal limits   BASIC METABOLIC PANEL W/ REFLEX TO MG FOR LOW K - Abnormal; Notable for the following components:    Glucose 109 (*)     All other components within normal limits   COVID-19, RAPID   TROPONIN   BRAIN NATRIURETIC PEPTIDE   HCG, SERUM, QUALITATIVE   D-DIMER, QUANTITATIVE   TROPONIN   HCG, SERUM, QUALITATIVE       CDU IMPRESSION / PLAN      Alan Lin is a 54 y.o. female who presents with chest pain and acute life stressors. Cards eval'd and plan stress and echo. SW also to see for assistance with housing arrangements. Has f/u with Ortho next week. · Continue home medications and pain control  · Monitor vitals, labs, and imaging  · Diet / IVF: NPO, reg after stress  · Tubes / lines / drains: PIV  · DISPO: pending consults and clinical improvement    CONSULTS:    IP CONSULT TO ORTHOPEDIC SURGERY  IP CONSULT TO FAMILY MEDICINE  IP CONSULT TO CARDIOLOGY  IP CONSULT TO SOCIAL WORK  IP CONSULT TO SOCIAL WORK  IP CONSULT TO SOCIAL WORK    PROCEDURES:  Not indicated       PATIENT REFERRED TO:    No follow-up provider specified. --  Abida Hurley MD  Emergency Medicine Resident     This dictation was generated by voice recognition computer software. Although all attempts are made to edit the dictation for accuracy, there may be errors in the transcription that are not intended.

## 2022-03-14 NOTE — CONSULTS
Attestation signed by      Attending Physician Statement:    I have discussed the care of  Belkis Arriola , including pertinent history and exam findings, with the Cardiology fellow/resident. I have seen and examined the patient and the key elements of all parts of the encounter have been performed by me. I agree with the assessment, plan and orders as documented by the fellow/resident, after I modified exam findings and plan of treatments, and the final version is my approved version of the assessment. Additional Comments:   Episodes of chest pain  Trops are neg  ECG: not available  Plan for a TTE and a stress test  Holter on discharge for PVCs           [facility]    Consult / H&P               Today's Date: 3/14/2022  Patient Name: Belkis Arriola  Date of admission: 3/13/2022  9:49 PM  Patient's age: 54 y.o., 1970  Admission Dx: Atypical chest pain [R07.89]  Chest pain, unspecified type [R07.9]    Reason for Consult:  Cardiac evaluation    Requesting Physician: Gina Chan MD    CHIEF COMPLAINT:  Chest pain     History Obtained From:  patient    HISTORY OF PRESENT ILLNESS:    65-year-old female with history of essential hypertension, moderate aortic, obesity presented with complaint of chest pain. Pain is also complaining of bilateral wrist pain. Has history of carpal tunnel syndrome. ECG showed LVH. ST changes secondary to LVH. No history of coronary disease in the past.  No recent ischemia work-up. Patient had echo done in  which showed moderate aortic insufficiency. Troponin x2 -. Past Medical History:   has a past medical history of Bipolar disorder ([address] Depression, Gastritis, Hypertension, and IBS (irritable bowel syndrome). Past Surgical History:   has a past surgical history that includes Tonsillectomy; Appendectomy; Tubal ligation;  section; Hysterectomy; and Abdomen surgery.      Home Medications:    Prior to Admission medications Medication Sig Start Date End Date Taking?  Authorizing Provider   folic acid (FOLVITE) 1 MG tablet TAKE 1 TABLET BY MOUTH DAILY 3/11/22   Dennise Garcia MD   VENTOLIN  (90 Base) MCG/ACT inhaler INHALE TWO (2) PUFFS BY MOUTH FOUR TIMES A DAY AS NEEDED FOR WHEEZING 3/11/22   Trudy Stewart MD   omeprazole (PRILOSEC) 20 MG delayed release capsule TAKE 1 CAPSULE BY MOUTH DAILY WITH BREAKFAST 3/11/22   Dennise Garcia MD   diclofenac sodium (VOLTAREN) 1 % GEL APPLY 2 GRAMS TOPICALLY FOUR TIMES A DAY 3/11/22   Jessi Flores MD   meclizine (ANTIVERT) 12.5 MG tablet TAKE 1 TABLET BY MOUTH THREE TIMES A DAY AS NEEDED FOR DIZZINESS OR NAUSEA. 3/11/22   Sallyann Burkitt, MD   colestipol (COLESTID) 1 g tablet TAKE 1 TABLET BY MOUTH IN THE MORNING 3/11/22   Kay Higginbotham MD   cloNIDine (CATAPRES) 0.3 MG tablet TAKE ONE (1) TABLET BY MOUTH TWICE DAILY 3/11/22   Kay Higginbotham MD   ACETAMINOPHEN EXTRA STRENGTH 500 MG tablet TAKE 1 TABLET BY MOUTH FOUR TIMES DAILY AS NEEDED FOR PAIN *F/U:MARTINEZ Guerra [facility] [phone] R Margarita Mitchell [address] [place] [id number]* 2/4/22   Hermes Schroeder MD   polyethylene glycol (GLYCOLAX) 17 GM/SCOOP powder DISSOLVE 17 GRAMS (1 CAPFUL) IN 8 OUNCES OF LIQUID AND DRINK BY MOUTH ONCE DAILY 2/4/22   Sallyann Burkitt, MD   mineral oil liquid TAKE 30 ML BY MOUTH DAILY AS NEEDED FOR CONSTIPATION 12/1/21   Mee Rogel MD   hydroCHLOROthiazide (HYDRODIURIL) 25 MG tablet TAKE 1 TABLET BY MOUTH DAILY 11/22/21   Michael Salcedo MD   losartan (COZAAR) 25 MG tablet Take 1 tablet by mouth daily 11/22/21   Michael Salcedo MD   Multiple Vitamins-Minerals (MULTIVITAMIN WITH MINERALS) tablet Take 1 tablet by mouth daily 11/22/21   Michael Salcedo MD   amLODIPine (NORVASC) 5 MG tablet Take 2 tablets by mouth daily 11/22/21   Michael Salcedo MD   loratadine (CLARITIN) 10 MG tablet TAKE 1 TABLET BY MOUTH NIGHTLY AS NEEDED FOR SEASONAL ALLERGIES 7/6/21   Julie Gowers, MD   Lidocaine, Anorectal, (HEMORRHOIDAL RELIEF) 5 % CREA Apply 1 Applicatorful topically daily 3/10/21   Italia Garvin MD   gabapentin (NEURONTIN) 100 MG capsule Take 3 capsules by mouth 3 times daily for 30 days. 12/21/20 1/20/21  Nilda Davis MD   melatonin (RA MELATONIN) 3 MG TABS tablet Take 1 tablet by mouth daily 11/3/20   Reji Jerez MD   Incontinence Supply Disposable ([facility] INCONTINENCE PADS) MISC 1 each by Does not apply route daily as needed (for incontinence as needed) 11/3/20   Reji Jerez MD   Blood Pressure KIT 1 each by Does not apply route as needed (take as needed for blood pressure monitoring at home) 11/3/20   Reji Jerez MD   D3 SUPER STRENGTH 50 MCG (2000 UT) CAPS TAKE (1) CAPSULE BY MOUTH DAILY 10/23/20   Reji Jerez MD   ondansetron (ZOFRAN-ODT) 4 MG disintegrating tablet Take 1 tablet by mouth 3 times daily as needed for Nausea or Vomiting 9/17/20   Reji Jerez MD   butenafine (LOTRIMIN ULTRA) 1 % CREA Apply 1 Tube topically as needed (apply to affected area as needed) 9/17/20   Reji Jerez MD   estradiol (ESTRACE) 1 MG tablet Take 1 mg by mouth daily 7/9/19   Historical Provider, MD   sertraline (ZOLOFT) 100 MG tablet Take 100 mg by mouth    Historical Provider, MD   prazosin (MINIPRESS) 2 MG capsule  7/6/18   Historical Provider, MD   traZODone (DESYREL) 100 MG tablet Take 200 mg by mouth nightly as needed for Sleep    Historical Provider, MD   Sodium Phosphates (FLEET) 7-19 GM/118ML Place 1 enema rectally daily as needed (constipation).  3/16/15   Amelia Lechuga DO      Current Facility-Administered Medications: amLODIPine (NORVASC) tablet 10 mg, 10 mg, Oral, Daily  hydroCHLOROthiazide (HYDRODIURIL) tablet 25 mg, 25 mg, Oral, Daily  losartan (COZAAR) tablet 25 mg, 25 mg, Oral, Daily  sertraline (ZOLOFT) tablet 100 mg, 100 mg, Oral, Daily  traZODone (DESYREL) tablet 200 mg, 200 mg, Oral, Nightly PRN  sodium chloride flush 0.9 % injection 5-40 mL, 5-40 mL, IntraVENous, 2 times per day  sodium chloride flush 0.9 % injection 5-40 mL, 5-40 mL, IntraVENous, PRN  0.9 % sodium chloride infusion, 25 mL, IntraVENous, PRN  enoxaparin (LOVENOX) injection 40 mg, 40 mg, SubCUTAneous, Daily  acetaminophen (TYLENOL) tablet 650 mg, 650 mg, Oral, Q4H PRN  ondansetron (ZOFRAN-ODT) disintegrating tablet 4 mg, 4 mg, Oral, Q8H PRN **OR** ondansetron (ZOFRAN) injection 4 mg, 4 mg, IntraVENous, Q6H PRN  HYDROcodone-acetaminophen (NORCO) 5-325 MG per tablet 1 tablet, 1 tablet, Oral, Q4H PRN **OR** HYDROcodone-acetaminophen (NORCO) 5-325 MG per tablet 2 tablet, 2 tablet, Oral, Q4H PRN    Allergies:  Motrin [ibuprofen]    Social History:   reports that she has never smoked. She has never used smokeless tobacco. She reports current alcohol use. She reports current drug use. Drug: Marijuana Susan Luz). Family History: family history includes Diabetes in her mother. No h/o sudden cardiac death. No for premature CAD    REVIEW OF SYSTEMS:    · Constitutional: there has been no unanticipated weight loss. There's been No change in energy level, No change in activity level. · Eyes: No visual changes or diplopia. No scleral icterus. · ENT: No Headaches  · Cardiovascular: No cardiac history  · Respiratory: No previous pulmonary problems, No cough  · Gastrointestinal: No abdominal pain. No change in bowel or bladder habits. · Genitourinary: No dysuria, trouble voiding, or hematuria. · Musculoskeletal:  No gait disturbance, No weakness or joint complaints. · Integumentary: No rash or pruritis. PHYSICAL EXAM:      BP (!) 143/89   Pulse 74   Temp 97.5 °F (36.4 °C) (Oral)   Resp 18   SpO2 96%    Constitutional and General Appearance: alert, cooperative, no distress and appears stated age  HEENT: PERRL, no cervical lymphadenopathy. No masses palpable. Normal oral mucosa  Respiratory:  · Normal excursion and expansion without use of accessory muscles  · Resp Auscultation: Good respiratory effort.  No for increased work of breathing. On auscultation: clear to auscultation bilaterally  Cardiovascular:  · The apical impulse is not displaced  · Heart tones are crisp and normal. regular S1 and S2.  · Jugular venous pulsation Normal  · The carotid upstroke is normal in amplitude and contour without delay or bruit  · Peripheral pulses are symmetrical and full   Abdomen:   · No masses or tenderness  · Bowel sounds present  Extremities:  ·  No Cyanosis or Clubbing  ·  Lower extremity edema: No  ·  Skin: Warm and dry  Neurological:  · Alert and oriented. · Moves all extremities well  · No abnormalities of mood, affect, memory, mentation, or behavior are noted    DATA:      Labs:     CBC:   Recent Labs     03/13/22 2217   WBC 5.9   HGB 12.0   HCT 37.0        BMP:   Recent Labs     03/13/22  2217      K 3.7   CO2 23   BUN 15   CREATININE 0.64   LABGLOM >60   GLUCOSE 109*     BNP: No results for input(s): BNP in the last 72 hours. PT/INR: No results for input(s): PROTIME, INR in the last 72 hours. APTT:No results for input(s): APTT in the last 72 hours. CARDIAC ENZYMES:No results for input(s): CKTOTAL, CKMB, CKMBINDEX, TROPONINI in the last 72 hours. FASTING LIPID PANEL:  Lab Results   Component Value Date    HDL 70 09/16/2019    TRIG 154 09/16/2019     LIVER PROFILE:No results for input(s): AST, ALT, LABALBU in the last 72 hours. IMPRESSION:    Patient Active Problem List   Diagnosis    Major depressive disorder, recurrent episode, severe (HCC)    Bilateral lower abdominal pain    Irritable bowel syndrome with constipation and diarrhea    Uterine leiomyoma    Mirena IUD (1/24/17)    Abnormal uterine bleeding (AUB)    Small bowel obstruction (HCC)    Essential hypertension    Seasonal allergies    Sleep difficulties    Bilateral carpal tunnel syndrome    Bipolar 1 disorder (HCC)    Atypical chest pain     Assessment       1. Chest pain   2 history of carpal tunnel syndrome.   3 Essential nsion hypertension. 4.  Moderate AI on echocardiography in 2019.  5 BOBBY    RECOMMENDATIONS:  1. ECG showed  2   continue Norvasc, losartan, clonidine and hydrochlorothiazide. 2. Will obtain 2D echocardiography to rule out any structural and functional abnormality of the heart. Patient has history of moderate aortic insufficiency. 3. His pain is atypical in nature but patient has significant risk factor for coronary artery disease. Will obtain Treadmill Cardiolite  stress test for further stratification. Discussed with patient and Nurse.     Electronically signed by Murali Harrison MD on 3/14/2022 at 8:17 [address] [place] [address] [facility]      [phone]

## 2022-03-14 NOTE — CARE COORDINATION
Met with pt to discuss housing concerns. Pt signed [facility] referral and referral was faxed. Spoke with [facility] at [facility] to discuss referral and she states she will be reaching out to pt to assist her. Pt tearful and appreciative of SW assistance. Provided pt with number to [facility] to follow up. Pt states that if she does not have new Section 8 housing by the end of the month she is able to stay with her daughter until new housing is found.

## 2022-03-15 LAB
EKG ATRIAL RATE: 88 BPM
EKG P AXIS: 40 DEGREES
EKG P-R INTERVAL: 156 MS
EKG Q-T INTERVAL: 382 MS
EKG QRS DURATION: 92 MS
EKG QTC CALCULATION (BAZETT): 462 MS
EKG R AXIS: 15 DEGREES
EKG T AXIS: 8 DEGREES
EKG VENTRICULAR RATE: 88 BPM

## 2022-03-15 NOTE — PROGRESS NOTES
[facility]  CDU / OBSERVATION ENCOUNTER  ATTENDING NOTE       I performed a history and physical examination of the patient and discussed management with the resident or midlevel provider. I reviewed the resident or midlevel provider's note and agree with the documented findings and plan of care. Any areas of disagreement are noted on the chart. I was personally present for the key portions of any procedures. I have documented in the chart those procedures where I was not present during the key portions. I have reviewed the nurses notes. I agree with the chief complaint, past medical history, past surgical history, allergies, medications, social and family history as documented unless otherwise noted below. The Family history, social history, and ROS are effectively unchanged since admission unless noted elsewhere in the chart. Patient with atypical chest pain with risk factors. Evaluated by cardiology. Disposition dependent on study results. Patient improved with rest and time. Patient asymptomatic at time of evaluation regimen proved from presentation. With negative studies and improved chest pain patient was discharged in good condition. Medications refilled for patient.   Instructions given on how to set up for PCP follow-up    Reinaldo Alston MD  Attending Emergency  Physician

## 2022-03-16 ENCOUNTER — TELEPHONE (OUTPATIENT)
Dept: FAMILY MEDICINE CLINIC | Age: 52
End: 2022-03-16

## 2022-03-16 NOTE — TELEPHONE ENCOUNTER
[facility] Initial Follow Up Call    Outreach made within 2 business days of discharge: Yes    Patient: Ashley Camilo   Patient : 1970   MRN: <[id number]>    Reason for Admission: atypical chest pain  Discharge Date: 3/14/2022       Spoke with: Phone off, straight to . Left HIPAA compliant message identifying self and nature of call, requested call back to writer, phone number given. Discharge department/facility: [facility]        Scheduled appointment with PCP within 7-14 days    Follow Up  No future appointments.     Peggy Fiore RN

## 2022-03-17 ENCOUNTER — TELEPHONE (OUTPATIENT)
Dept: ORTHOPEDIC SURGERY | Age: 52
End: 2022-03-17

## 2022-03-17 NOTE — TELEPHONE ENCOUNTER
Attempt 2, phone off, straight to VM. Left HIPAA compliant message identifying self and nature of call, requested call back to writer or office, phone numbers given.

## 2022-03-18 ENCOUNTER — TELEPHONE (OUTPATIENT)
Dept: ORTHOPEDIC SURGERY | Age: 52
End: 2022-03-18

## 2022-03-18 NOTE — TELEPHONE ENCOUNTER
Attempt 3, phone on today, no answer. Left HIPAA compliant message identifying self and nature of call, requested call back to writer or office, phone numbers given.

## 2022-03-18 NOTE — TELEPHONE ENCOUNTER
Called patient to move up her Ed visit from 03/29/2022 to 03/21/2022 lyndsey was unable to move since she works at that time. She will keep her 03/29/2022 appointment.

## 2022-03-29 ENCOUNTER — OFFICE VISIT (OUTPATIENT)
Dept: ORTHOPEDIC SURGERY | Age: 52
End: 2022-03-29
Payer: COMMERCIAL

## 2022-03-29 VITALS — BODY MASS INDEX: 41.03 KG/M2 | WEIGHT: 209 LBS | HEIGHT: 60 IN

## 2022-03-29 DIAGNOSIS — M65.312 TRIGGER FINGER OF LEFT THUMB: Primary | ICD-10-CM

## 2022-03-29 DIAGNOSIS — G56.01 CARPAL TUNNEL SYNDROME OF RIGHT WRIST: ICD-10-CM

## 2022-03-29 PROCEDURE — 1036F TOBACCO NON-USER: CPT | Performed by: ORTHOPAEDIC SURGERY

## 2022-03-29 PROCEDURE — G8427 DOCREV CUR MEDS BY ELIG CLIN: HCPCS | Performed by: ORTHOPAEDIC SURGERY

## 2022-03-29 PROCEDURE — 99203 OFFICE O/P NEW LOW 30 MIN: CPT | Performed by: ORTHOPAEDIC SURGERY

## 2022-03-29 PROCEDURE — G8482 FLU IMMUNIZE ORDER/ADMIN: HCPCS | Performed by: ORTHOPAEDIC SURGERY

## 2022-03-29 PROCEDURE — G8417 CALC BMI ABV UP PARAM F/U: HCPCS | Performed by: ORTHOPAEDIC SURGERY
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
## 2022-03-30 DIAGNOSIS — Z76.0 MEDICATION REFILL: ICD-10-CM

## 2022-03-30 RX ORDER — LORATADINE 10 MG/1
TABLET ORAL
Qty: 30 TABLET | Refills: 1 | Status: SHIPPED | OUTPATIENT
Start: 2022-03-30 | End: 2022-06-09

## 2022-03-30 NOTE — TELEPHONE ENCOUNTER
Please address the medication refill and close the encounter. If I can be of assistance, please route to the applicable pool. Thank you. Last visit: 11/22/2021  Last Med refill: 3-24-22  Does patient have enough medication for 72 hours: Yes    Next Visit Date:  No future appointments. Health Maintenance   Topic Date Due    Hepatitis C screen  Never done    Depression Monitoring  Never done    HIV screen  Never done    COVID-19 Vaccine (2 - Booster for Jo-Ann series) 07/14/2021    Cervical cancer screen  12/21/2021    Diabetes screen  09/16/2022    Potassium monitoring  03/13/2023    Creatinine monitoring  03/13/2023    Lipid screen  09/16/2024    Colorectal Cancer Screen  02/26/2029    DTaP/Tdap/Td vaccine (2 - Td or Tdap) 10/09/2029    Flu vaccine  Completed    Hepatitis A vaccine  Aged Out    Hepatitis B vaccine  Aged Out    Hib vaccine  Aged Out    Meningococcal (ACWY) vaccine  Aged Out    Pneumococcal 0-64 years Vaccine  Aged Out       Hemoglobin A1C (%)   Date Value   09/16/2019 5.5   12/21/2016 6.1 (H)   05/16/2014 5.6             ( goal A1C is < 7)   Microalb/Crt.  Ratio (mcg/mg creat)   Date Value   10/26/2011 11     LDL Cholesterol (mg/dL)   Date Value   09/16/2019 45   05/17/2014 93       (goal LDL is <100)   AST (U/L)   Date Value   06/25/2020 17     ALT (U/L)   Date Value   06/25/2020 15     BUN (mg/dL)   Date Value   03/13/2022 15     BP Readings from Last 3 Encounters:   03/14/22 (!) 160/101   11/22/21 (!) 148/94   12/21/20 125/88          (goal 120/80)    All Future Testing planned in [facility]  Lab Frequency Next Occurrence   Basic Metabolic Panel Once 53/93/1444   HIV Screen Once 11/22/2022   Hepatitis C Antibody Once 11/22/2022               Patient Active Problem List:     Major depressive disorder, recurrent episode, severe (HCC)     Bilateral lower abdominal pain     Irritable bowel syndrome with constipation and diarrhea     Uterine leiomyoma     Mirena IUD (1/24/17)     Abnormal uterine bleeding (AUB)     Small bowel obstruction (HCC)     Essential hypertension     Seasonal allergies     Sleep difficulties     Bilateral carpal tunnel syndrome     Bipolar 1 disorder (HCC)     Atypical chest pain     Trigger finger of left thumb     Carpal tunnel syndrome of right wrist

## 2022-04-12 ENCOUNTER — OFFICE VISIT (OUTPATIENT)
Dept: ORTHOPEDIC SURGERY | Age: 52
End: 2022-04-12
Payer: COMMERCIAL

## 2022-04-12 VITALS — HEIGHT: 60 IN | BODY MASS INDEX: 41.03 KG/M2 | WEIGHT: 209 LBS

## 2022-04-12 DIAGNOSIS — M65.312 TRIGGER FINGER OF LEFT THUMB: Primary | ICD-10-CM

## 2022-04-12 DIAGNOSIS — G56.01 CARPAL TUNNEL SYNDROME OF RIGHT WRIST: ICD-10-CM

## 2022-04-12 PROCEDURE — 99212 OFFICE O/P EST SF 10 MIN: CPT | Performed by: ORTHOPAEDIC SURGERY

## 2022-04-12 PROCEDURE — G8427 DOCREV CUR MEDS BY ELIG CLIN: HCPCS | Performed by: ORTHOPAEDIC SURGERY

## 2022-04-12 PROCEDURE — G8417 CALC BMI ABV UP PARAM F/U: HCPCS | Performed by: ORTHOPAEDIC SURGERY

## 2022-04-12 PROCEDURE — 1036F TOBACCO NON-USER: CPT | Performed by: ORTHOPAEDIC SURGERY

## 2022-04-12 NOTE — PROGRESS NOTES
This patient who had corticosteroid injection into the right carpal tunnel and left trigger thumb is seen here in follow-up. Patient says that her both hands are much better she has no further triggering in the left thumb. The symptoms in the right hand from the carpal tunnel are also improving. Today on examination there was no tenderness over the MP joint of the left thumb and she had full flexion of the thumb in extension. As for the right wrist her Phalen test was negative sensation is intact. Diagnosis: Resolving right carpal tunnel syndrome. #2 resolved left trigger thumb.     Treatment: Reassured her that it should still continue to improve and should she have recurrence of symptoms then will be happy to see her again

## 2022-04-15 DIAGNOSIS — Z76.0 MEDICATION REFILL: ICD-10-CM

## 2022-04-15 DIAGNOSIS — I10 ESSENTIAL HYPERTENSION: ICD-10-CM

## 2022-04-15 DIAGNOSIS — K58.2 IRRITABLE BOWEL SYNDROME WITH CONSTIPATION AND DIARRHEA: ICD-10-CM

## 2022-04-15 DIAGNOSIS — R10.30 LOWER ABDOMINAL PAIN: ICD-10-CM

## 2022-04-15 DIAGNOSIS — G47.9 SLEEP DIFFICULTIES: ICD-10-CM

## 2022-04-15 RX ORDER — MONTELUKAST SODIUM 4 MG/1
TABLET, CHEWABLE ORAL
Qty: 30 TABLET | Refills: 3 | Status: SHIPPED | OUTPATIENT
Start: 2022-04-15 | End: 2022-05-13

## 2022-04-15 RX ORDER — ESTRADIOL 1 MG/1
1 TABLET ORAL DAILY
Qty: 30 TABLET | Refills: 0 | OUTPATIENT
Start: 2022-04-15 | End: 2022-05-15

## 2022-04-15 RX ORDER — GABAPENTIN 100 MG/1
300 CAPSULE ORAL 3 TIMES DAILY
Qty: 270 CAPSULE | Refills: 0 | OUTPATIENT
Start: 2022-04-15 | End: 2022-05-15

## 2022-04-15 RX ORDER — PRAZOSIN HYDROCHLORIDE 2 MG/1
2 CAPSULE ORAL DAILY PRN
Qty: 30 CAPSULE | Refills: 0 | OUTPATIENT
Start: 2022-04-15 | End: 2022-05-15

## 2022-04-15 RX ORDER — SERTRALINE HYDROCHLORIDE 100 MG/1
100 TABLET, FILM COATED ORAL DAILY
Qty: 30 TABLET | Refills: 0 | OUTPATIENT
Start: 2022-04-15 | End: 2022-05-15

## 2022-04-15 RX ORDER — ALBUTEROL SULFATE 90 UG/1
AEROSOL, METERED RESPIRATORY (INHALATION)
Qty: 1 EACH | Refills: 3 | Status: SHIPPED | OUTPATIENT
Start: 2022-04-15 | End: 2022-04-22 | Stop reason: SDUPTHER

## 2022-04-15 RX ORDER — TRAZODONE HYDROCHLORIDE 100 MG/1
200 TABLET ORAL NIGHTLY PRN
Qty: 60 TABLET | Refills: 0 | OUTPATIENT
Start: 2022-04-15 | End: 2022-05-15

## 2022-04-15 RX ORDER — CLONIDINE HYDROCHLORIDE 0.3 MG/1
TABLET ORAL
Qty: 30 TABLET | Refills: 0 | OUTPATIENT
Start: 2022-04-15

## 2022-04-15 RX ORDER — AMLODIPINE BESYLATE 5 MG/1
TABLET ORAL
Qty: 30 TABLET | Refills: 3 | Status: SHIPPED | OUTPATIENT
Start: 2022-04-15 | End: 2022-04-22 | Stop reason: SDUPTHER

## 2022-04-15 RX ORDER — LANOLIN ALCOHOL/MO/W.PET/CERES
3 CREAM (GRAM) TOPICAL NIGHTLY PRN
Qty: 30 TABLET | Refills: 0 | OUTPATIENT
Start: 2022-04-15

## 2022-04-15 NOTE — TELEPHONE ENCOUNTER
Please address the medication refill and close the encounter. If I can be of assistance, please route to the applicable pool. Thank you. Last visit: 11/22/2021  Last Med refill: 3-24-22 ventolin     3-15-22 amlodipine    3-24-22 colestipol  Does patient have enough medication for 72 hours: No:     Next Visit Date:  No future appointments. Health Maintenance   Topic Date Due    Hepatitis C screen  Never done    Depression Monitoring  Never done    HIV screen  Never done    COVID-19 Vaccine (2 - Booster for Jo-Ann series) 07/14/2021    Cervical cancer screen  12/21/2021    Diabetes screen  09/16/2022    Potassium monitoring  03/13/2023    Creatinine monitoring  03/13/2023    Lipid screen  09/16/2024    Colorectal Cancer Screen  02/26/2029    DTaP/Tdap/Td vaccine (2 - Td or Tdap) 10/09/2029    Flu vaccine  Completed    Hepatitis A vaccine  Aged Out    Hepatitis B vaccine  Aged Out    Hib vaccine  Aged Out    Meningococcal (ACWY) vaccine  Aged Out    Pneumococcal 0-64 years Vaccine  Aged Out       Hemoglobin A1C (%)   Date Value   09/16/2019 5.5   12/21/2016 6.1 (H)   05/16/2014 5.6             ( goal A1C is < 7)   Microalb/Crt.  Ratio (mcg/mg creat)   Date Value   10/26/2011 11     LDL Cholesterol (mg/dL)   Date Value   09/16/2019 45   05/17/2014 93       (goal LDL is <100)   AST (U/L)   Date Value   06/25/2020 17     ALT (U/L)   Date Value   06/25/2020 15     BUN (mg/dL)   Date Value   03/13/2022 15     BP Readings from Last 3 Encounters:   03/14/22 (!) 160/101   11/22/21 (!) 148/94   12/21/20 125/88          (goal 120/80)    All Future Testing planned in [facility]  Lab Frequency Next Occurrence   Basic Metabolic Panel Once 26/05/9480   HIV Screen Once 11/22/2022   Hepatitis C Antibody Once 11/22/2022               Patient Active Problem List:     Major depressive disorder, recurrent episode, severe (HCC)     Bilateral lower abdominal pain     Irritable bowel syndrome with constipation and diarrhea     Uterine leiomyoma     Mirena IUD (1/24/17)     Abnormal uterine bleeding (AUB)     Small bowel obstruction (HCC)     Essential hypertension     Seasonal allergies     Sleep difficulties     Bilateral carpal tunnel syndrome     Bipolar 1 disorder (HCC)     Atypical chest pain     Trigger finger of left thumb     Carpal tunnel syndrome of right wrist

## 2022-04-18 DIAGNOSIS — I10 ESSENTIAL HYPERTENSION: ICD-10-CM

## 2022-04-19 RX ORDER — CLONIDINE HYDROCHLORIDE 0.3 MG/1
TABLET ORAL
Qty: 30 TABLET | Refills: 10 | OUTPATIENT
Start: 2022-04-19

## 2022-04-19 NOTE — TELEPHONE ENCOUNTER
Last visit: 11/21/22  Last Med refill:   Does patient have enough medication for 72 hours: No:     Next Visit Date:  No future appointments. Health Maintenance   Topic Date Due    Hepatitis C screen  Never done    Depression Monitoring  Never done    HIV screen  Never done    COVID-19 Vaccine (2 - Booster for Jo-Ann series) 07/14/2021    Cervical cancer screen  12/21/2021    Diabetes screen  09/16/2022    Potassium monitoring  03/13/2023    Creatinine monitoring  03/13/2023    Lipid screen  09/16/2024    Colorectal Cancer Screen  02/26/2029    DTaP/Tdap/Td vaccine (2 - Td or Tdap) 10/09/2029    Flu vaccine  Completed    Hepatitis A vaccine  Aged Out    Hepatitis B vaccine  Aged Out    Hib vaccine  Aged Out    Meningococcal (ACWY) vaccine  Aged Out    Pneumococcal 0-64 years Vaccine  Aged Out       Hemoglobin A1C (%)   Date Value   09/16/2019 5.5   12/21/2016 6.1 (H)   05/16/2014 5.6             ( goal A1C is < 7)   Microalb/Crt.  Ratio (mcg/mg creat)   Date Value   10/26/2011 11     LDL Cholesterol (mg/dL)   Date Value   09/16/2019 45   05/17/2014 93       (goal LDL is <100)   AST (U/L)   Date Value   06/25/2020 17     ALT (U/L)   Date Value   06/25/2020 15     BUN (mg/dL)   Date Value   03/13/2022 15     BP Readings from Last 3 Encounters:   03/14/22 (!) 160/101   11/22/21 (!) 148/94   12/21/20 125/88          (goal 120/80)    All Future Testing planned in [facility]  Lab Frequency Next Occurrence   Basic Metabolic Panel Once 52/21/4768   HIV Screen Once 11/22/2022   Hepatitis C Antibody Once 11/22/2022               Patient Active Problem List:     Major depressive disorder, recurrent episode, severe (HCC)     Bilateral lower abdominal pain     Irritable bowel syndrome with constipation and diarrhea     Uterine leiomyoma     Mirena IUD (1/24/17)     Abnormal uterine bleeding (AUB)     Small bowel obstruction (HCC)     Essential hypertension     Seasonal allergies     Sleep difficulties Bilateral carpal tunnel syndrome     Bipolar 1 disorder (HCC)     Atypical chest pain     Trigger finger of left thumb     Carpal tunnel syndrome of right wrist

## 2022-04-22 DIAGNOSIS — I10 ESSENTIAL HYPERTENSION: ICD-10-CM

## 2022-04-22 DIAGNOSIS — K58.2 IRRITABLE BOWEL SYNDROME WITH CONSTIPATION AND DIARRHEA: ICD-10-CM

## 2022-04-22 DIAGNOSIS — Z76.0 MEDICATION REFILL: ICD-10-CM

## 2022-04-22 DIAGNOSIS — R10.30 LOWER ABDOMINAL PAIN: ICD-10-CM

## 2022-04-22 RX ORDER — AMLODIPINE BESYLATE 5 MG/1
TABLET ORAL
Qty: 30 TABLET | Refills: 3 | Status: SHIPPED | OUTPATIENT
Start: 2022-04-22 | End: 2022-08-05

## 2022-04-22 RX ORDER — LOSARTAN POTASSIUM 25 MG/1
25 TABLET ORAL DAILY
Qty: 30 TABLET | Refills: 3 | Status: SHIPPED | OUTPATIENT
Start: 2022-04-22 | End: 2022-10-25

## 2022-04-22 RX ORDER — HYDROCHLOROTHIAZIDE 25 MG/1
TABLET ORAL
Qty: 30 TABLET | Refills: 5 | Status: SHIPPED | OUTPATIENT
Start: 2022-04-22

## 2022-04-22 RX ORDER — CLONIDINE HYDROCHLORIDE 0.3 MG/1
TABLET ORAL
Qty: 30 TABLET | Refills: 10 | OUTPATIENT
Start: 2022-04-22

## 2022-04-22 RX ORDER — ALBUTEROL SULFATE 90 UG/1
AEROSOL, METERED RESPIRATORY (INHALATION)
Qty: 1 EACH | Refills: 3 | Status: SHIPPED | OUTPATIENT
Start: 2022-04-22

## 2022-04-22 RX ORDER — OMEPRAZOLE 20 MG/1
CAPSULE, DELAYED RELEASE ORAL
Qty: 30 CAPSULE | Refills: 5 | Status: SHIPPED | OUTPATIENT
Start: 2022-04-22

## 2022-04-22 RX ORDER — SERTRALINE HYDROCHLORIDE 100 MG/1
100 TABLET, FILM COATED ORAL DAILY
Qty: 30 TABLET | Refills: 0 | Status: SHIPPED | OUTPATIENT
Start: 2022-04-22 | End: 2022-06-13

## 2022-04-22 RX ORDER — GABAPENTIN 100 MG/1
300 CAPSULE ORAL 3 TIMES DAILY
Qty: 270 CAPSULE | Refills: 0 | Status: SHIPPED | OUTPATIENT
Start: 2022-04-22 | End: 2022-06-09

## 2022-04-22 NOTE — TELEPHONE ENCOUNTER
Last visit: 11/22/2021  Last Med refill: 3/14/22  Does patient have enough medication for 72 hours: No:     Next Visit Date:  No future appointments. Health Maintenance   Topic Date Due    Depression Monitoring  Never done    HIV screen  Never done    Hepatitis C screen  Never done    COVID-19 Vaccine (2 - Booster for Jo-Ann series) 07/14/2021    Cervical cancer screen  12/21/2021    Diabetes screen  09/16/2022    Potassium  03/13/2023    Creatinine  03/13/2023    Lipids  09/16/2024    Colorectal Cancer Screen  02/26/2029    DTaP/Tdap/Td vaccine (2 - Td or Tdap) 10/09/2029    Flu vaccine  Completed    Hepatitis A vaccine  Aged Out    Hepatitis B vaccine  Aged Out    Hib vaccine  Aged Out    Meningococcal (ACWY) vaccine  Aged Out    Pneumococcal 0-64 years Vaccine  Aged Out       Hemoglobin A1C (%)   Date Value   09/16/2019 5.5   12/21/2016 6.1 (H)   05/16/2014 5.6             ( goal A1C is < 7)   Microalb/Crt.  Ratio (mcg/mg creat)   Date Value   10/26/2011 11     LDL Cholesterol (mg/dL)   Date Value   09/16/2019 45   05/17/2014 93       (goal LDL is <100)   AST (U/L)   Date Value   06/25/2020 17     ALT (U/L)   Date Value   06/25/2020 15     BUN (mg/dL)   Date Value   03/13/2022 15     BP Readings from Last 3 Encounters:   03/14/22 (!) 160/101   11/22/21 (!) 148/94   12/21/20 125/88          (goal 120/80)    All Future Testing planned in [facility]  Lab Frequency Next Occurrence   Basic Metabolic Panel Once 06/53/9034   HIV Screen Once 11/22/2022   Hepatitis C Antibody Once 11/22/2022               Patient Active Problem List:     Major depressive disorder, recurrent episode, severe (HCC)     Bilateral lower abdominal pain     Irritable bowel syndrome with constipation and diarrhea     Uterine leiomyoma     Mirena IUD (1/24/17)     Abnormal uterine bleeding (AUB)     Small bowel obstruction (HCC)     Essential hypertension     Seasonal allergies     Sleep difficulties     Bilateral carpal tunnel syndrome     Bipolar 1 disorder (HCC)     Atypical chest pain     Trigger finger of left thumb     Carpal tunnel syndrome of right wrist

## 2022-05-13 ENCOUNTER — TELEPHONE (OUTPATIENT)
Dept: ORTHOPEDIC SURGERY | Age: 52
End: 2022-05-13

## 2022-05-13 DIAGNOSIS — R10.30 LOWER ABDOMINAL PAIN: ICD-10-CM

## 2022-05-13 DIAGNOSIS — K58.2 IRRITABLE BOWEL SYNDROME WITH CONSTIPATION AND DIARRHEA: ICD-10-CM

## 2022-05-13 RX ORDER — MONTELUKAST SODIUM 4 MG/1
TABLET, CHEWABLE ORAL
Qty: 30 TABLET | Refills: 10 | Status: SHIPPED | OUTPATIENT
Start: 2022-05-13

## 2022-05-13 RX ORDER — GABAPENTIN 100 MG/1
CAPSULE ORAL
Qty: 270 CAPSULE | Refills: 10 | OUTPATIENT
Start: 2022-05-13

## 2022-05-13 RX ORDER — SERTRALINE HYDROCHLORIDE 100 MG/1
100 TABLET, FILM COATED ORAL DAILY
OUTPATIENT
Start: 2022-05-13 | End: 2022-06-12

## 2022-05-13 NOTE — TELEPHONE ENCOUNTER
Writer left voicemail for patient to contact office to [facility] appt. Please address the medication refill and close the encounter. If I can be of assistance, please route to the applicable pool. Thank you. Last visit: 11/22/2021  Last Med refill: 4-26-22 sertraline    4-26-22 gabapentin  Does patient have enough medication for 72 hours: No:     Next Visit Date:  No future appointments. Health Maintenance   Topic Date Due    Depression Monitoring  Never done    HIV screen  Never done    Hepatitis C screen  Never done    COVID-19 Vaccine (2 - Booster for Jo-Ann series) 07/14/2021    Cervical cancer screen  12/21/2021    Diabetes screen  09/16/2022    Lipids  09/16/2024    Colorectal Cancer Screen  02/26/2029    DTaP/Tdap/Td vaccine (2 - Td or Tdap) 10/09/2029    Flu vaccine  Completed    Hepatitis A vaccine  Aged Out    Hepatitis B vaccine  Aged Out    Hib vaccine  Aged Out    Meningococcal (ACWY) vaccine  Aged Out    Pneumococcal 0-64 years Vaccine  Aged Out       Hemoglobin A1C (%)   Date Value   09/16/2019 5.5   12/21/2016 6.1 (H)   05/16/2014 5.6             ( goal A1C is < 7)   Microalb/Crt.  Ratio (mcg/mg creat)   Date Value   10/26/2011 11     LDL Cholesterol (mg/dL)   Date Value   09/16/2019 45   05/17/2014 93       (goal LDL is <100)   AST (U/L)   Date Value   06/25/2020 17     ALT (U/L)   Date Value   06/25/2020 15     BUN (mg/dL)   Date Value   03/13/2022 15     BP Readings from Last 3 Encounters:   03/14/22 (!) 160/101   11/22/21 (!) 148/94   12/21/20 125/88          (goal 120/80)    All Future Testing planned in [facility]  Lab Frequency Next Occurrence   Basic Metabolic Panel Once 91/37/5903   HIV Screen Once 11/22/2022   Hepatitis C Antibody Once 11/22/2022               Patient Active Problem List:     Major depressive disorder, recurrent episode, severe (HCC)     Bilateral lower abdominal pain     Irritable bowel syndrome with constipation and diarrhea     Uterine leiomyoma     Mirena IUD (1/24/17)     Abnormal uterine bleeding (AUB)     Small bowel obstruction (HCC)     Essential hypertension     Seasonal allergies     Sleep difficulties     Bilateral carpal tunnel syndrome     Bipolar 1 disorder (HCC)     Atypical chest pain     Trigger finger of left thumb     Carpal tunnel syndrome of right wrist

## 2022-05-13 NOTE — TELEPHONE ENCOUNTER
Patient called about increased pain to the hand. Returned call to patient. No answer.  Can not leave message

## 2022-05-13 NOTE — TELEPHONE ENCOUNTER
E-scribe request for med refill. Please review and e-scribe if applicable. Last Visit Date:  11/22/2021  Next Visit Date:  5/16/2022    Hemoglobin A1C (%)   Date Value   09/16/2019 5.5   12/21/2016 6.1 (H)   05/16/2014 5.6             ( goal A1C is < 7)   Microalb/Crt.  Ratio (mcg/mg creat)   Date Value   10/26/2011 11     LDL Cholesterol (mg/dL)   Date Value   09/16/2019 45       (goal LDL is <100)   AST (U/L)   Date Value   06/25/2020 17     ALT (U/L)   Date Value   06/25/2020 15     BUN (mg/dL)   Date Value   03/13/2022 15     BP Readings from Last 3 Encounters:   03/14/22 (!) 160/101   11/22/21 (!) 148/94   12/21/20 125/88          (goal 120/80)        Patient Active Problem List:     Major depressive disorder, recurrent episode, severe (HCC)     Bilateral lower abdominal pain     Irritable bowel syndrome with constipation and diarrhea     Uterine leiomyoma     Mirena IUD (1/24/17)     Abnormal uterine bleeding (AUB)     Small bowel obstruction (HCC)     Essential hypertension     Seasonal allergies     Sleep difficulties     Bilateral carpal tunnel syndrome     Bipolar 1 disorder (HCC)     Atypical chest pain     Trigger finger of left thumb     Carpal tunnel syndrome of right wrist      ----Elvia Peck

## 2022-05-16 ENCOUNTER — HOSPITAL ENCOUNTER (OUTPATIENT)
Age: 52
Setting detail: SPECIMEN
Discharge: HOME | End: 2022-05-16

## 2022-05-16 ENCOUNTER — OFFICE VISIT (OUTPATIENT)
Dept: FAMILY MEDICINE CLINIC | Age: 52
End: 2022-05-16
Payer: COMMERCIAL

## 2022-05-16 VITALS
SYSTOLIC BLOOD PRESSURE: 132 MMHG | HEART RATE: 77 BPM | BODY MASS INDEX: 37.73 KG/M2 | WEIGHT: 192.2 LBS | DIASTOLIC BLOOD PRESSURE: 87 MMHG | TEMPERATURE: 97.2 F | HEIGHT: 60 IN

## 2022-05-16 DIAGNOSIS — I10 ESSENTIAL HYPERTENSION: ICD-10-CM

## 2022-05-16 DIAGNOSIS — I10 ESSENTIAL HYPERTENSION: Primary | ICD-10-CM

## 2022-05-16 DIAGNOSIS — K62.5 RECTAL BLEEDING: ICD-10-CM

## 2022-05-16 LAB
ABSOLUTE EOS #: 0.06 K/UL (ref 0–0.44)
ABSOLUTE IMMATURE GRANULOCYTE: <0.03 K/UL (ref 0–0.3)
ABSOLUTE LYMPH #: 2.52 K/UL (ref 1.1–3.7)
ABSOLUTE MONO #: 0.36 K/UL (ref 0.1–1.2)
BASOPHILS # BLD: 0 % (ref 0–2)
BASOPHILS ABSOLUTE: <0.03 K/UL (ref 0–0.2)
EOSINOPHILS RELATIVE PERCENT: 1 % (ref 1–4)
HCT VFR BLD CALC: 39.2 % (ref 36.3–47.1)
HEMOGLOBIN: 12.3 G/DL (ref 11.9–15.1)
IMMATURE GRANULOCYTES: 0 %
LYMPHOCYTES # BLD: 37 % (ref 24–43)
MCH RBC QN AUTO: 28.3 PG (ref 25.2–33.5)
MCHC RBC AUTO-ENTMCNC: 31.4 G/DL (ref 28.4–34.8)
MCV RBC AUTO: 90.3 FL (ref 82.6–102.9)
MONOCYTES # BLD: 5 % (ref 3–12)
NRBC AUTOMATED: 0 PER 100 WBC
PDW BLD-RTO: 14 % (ref 11.8–14.4)
PLATELET # BLD: 212 K/UL (ref 138–453)
PMV BLD AUTO: 10.7 FL (ref 8.1–13.5)
RBC # BLD: 4.34 M/UL (ref 3.95–5.11)
SEG NEUTROPHILS: 57 % (ref 36–65)
SEGMENTED NEUTROPHILS ABSOLUTE COUNT: 3.88 K/UL (ref 1.5–8.1)
WBC # BLD: 6.9 K/UL (ref 3.5–11.3)

## 2022-05-16 PROCEDURE — 1036F TOBACCO NON-USER: CPT | Performed by: FAMILY MEDICINE

## 2022-05-16 PROCEDURE — 99214 OFFICE O/P EST MOD 30 MIN: CPT | Performed by: FAMILY MEDICINE

## 2022-05-16 PROCEDURE — G8417 CALC BMI ABV UP PARAM F/U: HCPCS | Performed by: FAMILY MEDICINE

## 2022-05-16 PROCEDURE — G8427 DOCREV CUR MEDS BY ELIG CLIN: HCPCS | Performed by: FAMILY MEDICINE

## 2022-05-16 RX ORDER — CLONIDINE HYDROCHLORIDE 0.3 MG/1
TABLET ORAL
Qty: 60 TABLET | Refills: 0 | Status: SHIPPED | OUTPATIENT
Start: 2022-05-16 | End: 2022-06-09

## 2022-05-16 SDOH — ECONOMIC STABILITY: FOOD INSECURITY: WITHIN THE PAST 12 MONTHS, THE FOOD YOU BOUGHT JUST DIDN'T LAST AND YOU DIDN'T HAVE MONEY TO GET MORE.: OFTEN TRUE

## 2022-05-16 SDOH — ECONOMIC STABILITY: FOOD INSECURITY: WITHIN THE PAST 12 MONTHS, YOU WORRIED THAT YOUR FOOD WOULD RUN OUT BEFORE YOU GOT MONEY TO BUY MORE.: OFTEN TRUE

## 2022-05-16 ASSESSMENT — PATIENT HEALTH QUESTIONNAIRE - PHQ9
2. FEELING DOWN, DEPRESSED OR HOPELESS: 3
1. LITTLE INTEREST OR PLEASURE IN DOING THINGS: 3
SUM OF ALL RESPONSES TO PHQ QUESTIONS 1-9: 6
SUM OF ALL RESPONSES TO PHQ9 QUESTIONS 1 & 2: 6
SUM OF ALL RESPONSES TO PHQ QUESTIONS 1-9: 6

## 2022-05-16 ASSESSMENT — SOCIAL DETERMINANTS OF HEALTH (SDOH): HOW HARD IS IT FOR YOU TO PAY FOR THE VERY BASICS LIKE FOOD, HOUSING, MEDICAL CARE, AND HEATING?: VERY HARD

## 2022-05-16 NOTE — TELEPHONE ENCOUNTER
E-scribe request for med refill. Please review and e-scribe if applicable. Last Visit Date:  05/16/2022  Next Visit Date:  Visit date not found    Hemoglobin A1C (%)   Date Value   09/16/2019 5.5   12/21/2016 6.1 (H)   05/16/2014 5.6             ( goal A1C is < 7)   Microalb/Crt.  Ratio (mcg/mg creat)   Date Value   10/26/2011 11     LDL Cholesterol (mg/dL)   Date Value   09/16/2019 45       (goal LDL is <100)   AST (U/L)   Date Value   06/25/2020 17     ALT (U/L)   Date Value   06/25/2020 15     BUN (mg/dL)   Date Value   03/13/2022 15     BP Readings from Last 3 Encounters:   05/16/22 132/87   03/14/22 (!) 160/101   11/22/21 (!) 148/94          (goal 120/80)        Patient Active Problem List:     Major depressive disorder, recurrent episode, severe (HCC)     Bilateral lower abdominal pain     Irritable bowel syndrome with constipation and diarrhea     Uterine leiomyoma     Mirena IUD (1/24/17)     Abnormal uterine bleeding (AUB)     Small bowel obstruction (HCC)     Essential hypertension     Seasonal allergies     Sleep difficulties     Bilateral carpal tunnel syndrome     Bipolar 1 disorder (HCC)     Atypical chest pain     Trigger finger of left thumb     Carpal tunnel syndrome of right wrist      ----David Pineda

## 2022-05-16 NOTE — PROGRESS NOTES
HYPERTENSION visit     BP Readings from Last 3 Encounters:   22 (!) 160/101   21 (!) 148/94   20 125/88       LDL Cholesterol (mg/dL)   Date Value   2019 45     HDL (mg/dL)   Date Value   2019 70     BUN (mg/dL)   Date Value   2022 15     CREATININE (mg/dL)   Date Value   2022 0.64     Glucose (mg/dL)   Date Value   2022 109 (H)              Have you changed or started any medications since your last visit including any over-the-counter medicines, vitamins, or herbal medicines? no   Have you stopped taking any of your medications? Is so, why? -  no  Are you having any side effects from any of your medications? - no  How often do you miss doses of your medication? occasional      Have you seen any other physician or provider since your last visit?  no   Have you had any other diagnostic tests since your last visit?  no   Have you been seen in the emergency room and/or had an admission in a hospital since we last saw you?  no   Have you had your routine dental cleaning in the past 6 months?  no     Do you have an active MyChart account? If no, what is the barrier?   No: code     Patient Care Team:  Gary Belle MD as PCP - General (Emergency Medicine)    Medical History Review  Past Medical, Family, and Social History reviewed and does not contribute to the patient presenting condition    Health Maintenance   Topic Date Due    Depression Monitoring  Never done    HIV screen  Never done    Hepatitis C screen  Never done    COVID-19 Vaccine (2 - Booster for Jo-Ann series) 2021    Cervical cancer screen  2021    Diabetes screen  2022    Lipids  2024    Colorectal Cancer Screen  2029    DTaP/Tdap/Td vaccine (2 - Td or Tdap) 10/09/2029    Flu vaccine  Completed    Hepatitis A vaccine  Aged Out    Hepatitis B vaccine  Aged Out    Hib vaccine  Aged Out    Meningococcal (ACWY) vaccine  Aged Out    Pneumococcal 0-64 years Vaccine  Aged Out

## 2022-05-16 NOTE — PROGRESS NOTES
2022     Shiela Mcneill (:  1970) is a 54 y.o. female, here for evaluation of the following medical concerns:  Chief Complaint   Patient presents with    Rectal Bleeding     when she passes gas will be in her underclothing, she is seeing alot of blood in the toliet, lower back and abd pain, usually see gastro     Hypertension     routine check up    [facility] Maintenance     pt had pap done at [facility], Positive PHQ-9, pt currenlty living from here to there, in car, until can get housing together. HPI   As above. Living out of her car. States is trying to get back in to GI. Review of Systems   Constitutional: Negative for fatigue. Respiratory: Negative for cough and shortness of breath. Cardiovascular: Negative for chest pain. Gastrointestinal: Positive for blood in stool. Prior to Visit Medications    Medication Sig Taking? Authorizing Provider   colestipol (COLESTID) 1 g tablet TAKE 1 TABLET BY MOUTH IN THE MORNING Yes Yasmine Valdovinos MD   amLODIPine (NORVASC) 5 MG tablet TAKE 1 TABLET BY MOUTH DAILY Yes Mckayla Guaman MD   albuterol sulfate  (90 Base) MCG/ACT inhaler INHALE TWO (2) PUFFS BY MOUTH FOUR TIMES A DAY AS NEEDED FOR WHEEZING Yes Mckayla Gauman MD   hydroCHLOROthiazide (HYDRODIURIL) 25 MG tablet TAKE 1 TABLET BY MOUTH DAILY Yes Mckayla Guaman MD   gabapentin (NEURONTIN) 100 MG capsule Take 3 capsules by mouth 3 times daily for 30 days.  May cause sedation Yes Mckayla Guaman MD   losartan (COZAAR) 25 MG tablet Take 1 tablet by mouth daily Yes Mckayla Guaman MD   sertraline (ZOLOFT) 100 MG tablet Take 1 tablet by mouth daily Yes Mckayla Guaman MD   omeprazole (PRILOSEC) 20 MG delayed release capsule TAKE 1 CAPSULE BY MOUTH DAILY WITH BREAKFAST Yes Mckayla Guaman MD   loratadine (CLARITIN) 10 MG tablet TAKE 1 TABLET BY MOUTH NIGHTLY AS NEEDED FOR SEASONAL ALLERGIES Yes Seymour Owen MD   melatonin (RA MELATONIN) 3 MG TABS tablet Take 1 tablet by mouth nightly as needed (for sleep) Yes Roderick Castro MD   folic acid (FOLVITE) 1 MG tablet TAKE 1 TABLET BY MOUTH DAILY Yes Yasmine Valdovinos MD   diclofenac sodium (VOLTAREN) 1 % GEL APPLY 2 GRAMS TOPICALLY FOUR TIMES A DAY Yes Mckayla Guaman MD   meclizine (ANTIVERT) 12.5 MG tablet TAKE 1 TABLET BY MOUTH THREE TIMES A DAY AS NEEDED FOR DIZZINESS OR NAUSEA.  Yes Mckayla Guaman MD   ACETAMINOPHEN EXTRA STRENGTH 500 MG tablet TAKE 1 TABLET BY MOUTH FOUR TIMES DAILY AS NEEDED FOR PAIN *F/U:MARTINEZ -[phone] [place] [address]* Yes Hermes Schroeder MD   polyethylene glycol (GLYCOLAX) 17 GM/SCOOP powder DISSOLVE 17 GRAMS (1 CAPFUL) IN 8 OUNCES OF LIQUID AND DRINK BY MOUTH ONCE DAILY Yes Mckayla Guaman MD   mineral oil liquid TAKE 30 ML BY MOUTH DAILY AS NEEDED FOR CONSTIPATION Yes Seymour Owen MD   Multiple Vitamins-Minerals (MULTIVITAMIN WITH MINERALS) tablet Take 1 tablet by mouth daily Yes Yvonne Ash MD   Lidocaine, Anorectal, (HEMORRHOIDAL RELIEF) 5 % CREA Apply 1 Applicatorful topically daily Yes Italia Chino MD   Incontinence Supply Disposable ([employer] INCONTINENCE PADS) [facility] 1 each by Does not apply route daily as needed (for incontinence as needed) Yes Sherley Douglas MD   Blood Pressure KIT 1 each by Does not apply route as needed (take as needed for blood pressure monitoring at home) Yes Sherley Douglas MD   D3 SUPER STRENGTH 50 MCG (2000 UT) CAPS TAKE (1) CAPSULE BY MOUTH DAILY Yes Sherley Douglas MD   ondansetron (ZOFRAN-ODT) 4 MG disintegrating tablet Take 1 tablet by mouth 3 times daily as needed for Nausea or Vomiting Yes Sherley Douglas MD   butenafine (LOTRIMIN ULTRA) 1 % CREA Apply 1 Tube topically as needed (apply to affected area as needed) Yes Sherley Douglas MD   cloNIDine (CATAPRES) 0.3 MG tablet TAKE ONE (1) TABLET BY MOUTH TWICE DAILY  Chad MD Angélica   traZODone (DESYREL) 100 MG tablet Take 2 tablets by mouth nightly as needed for Sleep  Jefferson Lobo MD   prazosin (MINIPRESS) 2 MG capsule Take 1 capsule by mouth daily as needed (PANIC)  Jefferson Lobo MD   estradiol (ESTRACE) 1 MG tablet Take 1 tablet by mouth daily  Jefferson Lobo MD   Sodium Phosphates (FLEET) 7-19 GM/118ML Place 1 enema rectally daily as needed (constipation). Patient not taking: Reported on 5/16/2022  Melissa Bernstein, DO      Allergies   Allergen Reactions    Motrin [Ibuprofen] Hives     Stomach cramping, sometimes her skin breaks out     Social History     Tobacco Use    Smoking status: Never Smoker    Smokeless tobacco: Never Used    Tobacco comment: Smokes marijuana   Substance Use Topics    Alcohol use: Yes     Comment: rare        Vitals:    05/16/22 0832   BP: 132/87  Comment: machine   Site: Left Upper Arm   Position: Sitting   Cuff Size: Large Adult   Pulse: 77   Temp: 97.2 °F (36.2 °C)   TempSrc: Temporal   Weight: 192 lb 3.2 oz (87.2 kg)   Height: 5' (1.524 m)     Estimated body mass index is 37.54 kg/m² as calculated from the following:    Height as of this encounter: 5' (1.524 m). Weight as of this encounter: 192 lb 3.2 oz (87.2 kg). Physical Exam  Constitutional:       Appearance: Normal appearance. Cardiovascular:      Rate and Rhythm: Normal rate and regular rhythm. Neurological:      Mental Status: She is alert. ASSESSMENT/PLAN:     Diagnosis Orders   1. Essential hypertension  [facility]   2. Rectal bleeding  [facility]    CBC with Auto Differential     Return in about 3 months (around 8/16/2022) for recheck. will check CBC, agrees to speak with  regarding housing in stability. CT abdomen pelvis with no acute pathology Jan of 2022. Needs GI follow up, states will take care of calling. Needs close follow up.   Requested Prescriptions      No prescriptions requested or ordered in this encounter     An electronic signature was used to authenticate this note.     --Nalini Zambrano,  on6/3/2022 at 8:53 AM

## 2022-05-16 NOTE — PATIENT INSTRUCTIONS
Thank you for letting us take care of you today. We hope all your questions were addressed. If a question was overlooked or something else comes to mind after you return home, please contact a member of your Care Team listed below. Your Care Team at [place] [address] is Team #5  Maia Enriquez MD (Faculty)  Ashley Denis MD (Resident)  Leigha Aburto MD (Resident)  Caleb Velasco MD (Resident)  Brandy Barahona MD (Resident)  Cliff Villalta., SHERRELL Cline., TROY Lucas., Lc Enriquez., [facility] office)  Sera Leo (Clinical Practice Manager)  Max Frazier [facility] (Clinical Pharmacist)       Office phone number: [phone]    If you need to get in right away due to illness, please be advised we have \"Same Day\" appointments available Monday-Friday. Please call us at [phone] option #3 to schedule your \"Same Day\" appointment.

## 2022-05-17 ENCOUNTER — TELEPHONE (OUTPATIENT)
Dept: FAMILY MEDICINE CLINIC | Age: 52
End: 2022-05-17

## 2022-05-17 DIAGNOSIS — R10.30 LOWER ABDOMINAL PAIN: ICD-10-CM

## 2022-05-17 RX ORDER — GABAPENTIN 100 MG/1
300 CAPSULE ORAL 3 TIMES DAILY
Qty: 270 CAPSULE | Refills: 0 | Status: CANCELLED | OUTPATIENT
Start: 2022-05-17 | End: 2022-06-16

## 2022-05-17 RX ORDER — SERTRALINE HYDROCHLORIDE 100 MG/1
100 TABLET, FILM COATED ORAL DAILY
Qty: 30 TABLET | Refills: 0 | Status: CANCELLED | OUTPATIENT
Start: 2022-05-17 | End: 2022-06-16

## 2022-05-17 NOTE — TELEPHONE ENCOUNTER
Arely Briones was contacted by Dave Kelly MA, a Allan Acuña, regarding a Social Determinants of Health referral.     A message was left with the writer's contact information.     First contact attempt

## 2022-05-17 NOTE — TELEPHONE ENCOUNTER
Last visit: 5/16/2022  Last Med refill: 4/22/22  Does patient have enough medication for 72 hours: Yes    Next Visit Date:  No future appointments. Health Maintenance   Topic Date Due    HIV screen  Never done    Hepatitis C screen  Never done    COVID-19 Vaccine (2 - Booster for Jo-Ann series) 07/14/2021    Cervical cancer screen  12/21/2021    Diabetes screen  09/16/2022    Depression Monitoring  05/16/2023    Lipids  09/16/2024    Colorectal Cancer Screen  02/26/2029    DTaP/Tdap/Td vaccine (2 - Td or Tdap) 10/09/2029    Flu vaccine  Completed    Hepatitis A vaccine  Aged Out    Hepatitis B vaccine  Aged Out    Hib vaccine  Aged Out    Meningococcal (ACWY) vaccine  Aged Out    Pneumococcal 0-64 years Vaccine  Aged Out       Hemoglobin A1C (%)   Date Value   09/16/2019 5.5   12/21/2016 6.1 (H)   05/16/2014 5.6             ( goal A1C is < 7)   Microalb/Crt.  Ratio (mcg/mg creat)   Date Value   10/26/2011 11     LDL Cholesterol (mg/dL)   Date Value   09/16/2019 45   05/17/2014 93       (goal LDL is <100)   AST (U/L)   Date Value   06/25/2020 17     ALT (U/L)   Date Value   06/25/2020 15     BUN (mg/dL)   Date Value   03/13/2022 15     BP Readings from Last 3 Encounters:   05/16/22 132/87   03/14/22 (!) 160/101   11/22/21 (!) 148/94          (goal 120/80)    All Future Testing planned in [facility]  Lab Frequency Next Occurrence   Basic Metabolic Panel Once 99/15/4430   HIV Screen Once 11/22/2022   Hepatitis C Antibody Once 11/22/2022               Patient Active Problem List:     Major depressive disorder, recurrent episode, severe (HCC)     Bilateral lower abdominal pain     Irritable bowel syndrome with constipation and diarrhea     Uterine leiomyoma     Mirena IUD (1/24/17)     Abnormal uterine bleeding (AUB)     Small bowel obstruction (HCC)     Essential hypertension     Seasonal allergies     Sleep difficulties     Bilateral carpal tunnel syndrome     Bipolar 1 disorder ([facility] [address]     Atypical chest pain     Trigger finger of left thumb     Carpal tunnel syndrome of right wrist

## 2022-05-19 ENCOUNTER — TELEPHONE (OUTPATIENT)
Dept: FAMILY MEDICINE CLINIC | Age: 52
End: 2022-05-19

## 2022-05-23 ENCOUNTER — TELEPHONE (OUTPATIENT)
Dept: FAMILY MEDICINE CLINIC | Age: 52
End: 2022-05-23

## 2022-05-23 NOTE — TELEPHONE ENCOUNTER
Elliot Alfonso was contacted by Ashleigh Lafleur MA, a Allan Acuña, regarding a Social Determinants of Health referral.     A message was left with the writer's contact information.     3rd attempt

## 2022-06-03 ASSESSMENT — ENCOUNTER SYMPTOMS
SHORTNESS OF BREATH: 0
BLOOD IN STOOL: 1
COUGH: 0

## 2022-06-09 DIAGNOSIS — R10.30 LOWER ABDOMINAL PAIN: ICD-10-CM

## 2022-06-09 DIAGNOSIS — Z76.0 MEDICATION REFILL: ICD-10-CM

## 2022-06-09 DIAGNOSIS — I10 ESSENTIAL HYPERTENSION: ICD-10-CM

## 2022-06-09 RX ORDER — LORATADINE 10 MG/1
TABLET ORAL
Qty: 30 TABLET | Refills: 10 | Status: SHIPPED | OUTPATIENT
Start: 2022-06-09

## 2022-06-09 RX ORDER — CLONIDINE HYDROCHLORIDE 0.3 MG/1
TABLET ORAL
Qty: 60 TABLET | Refills: 1 | Status: SHIPPED | OUTPATIENT
Start: 2022-06-09 | End: 2022-08-05

## 2022-06-09 NOTE — TELEPHONE ENCOUNTER
E-scribe request for med refills. Please review and e-scribe if applicable. Last Visit Date:  5/16/22  Next Visit Date:  Visit date not found    Hemoglobin A1C (%)   Date Value   09/16/2019 5.5   12/21/2016 6.1 (H)   05/16/2014 5.6             ( goal A1C is < 7)   Microalb/Crt.  Ratio (mcg/mg creat)   Date Value   10/26/2011 11     LDL Cholesterol (mg/dL)   Date Value   09/16/2019 45       (goal LDL is <100)   AST (U/L)   Date Value   06/25/2020 17     ALT (U/L)   Date Value   06/25/2020 15     BUN (mg/dL)   Date Value   03/13/2022 15     BP Readings from Last 3 Encounters:   05/16/22 132/87   03/14/22 (!) 160/101   11/22/21 (!) 148/94          (goal 120/80)        Patient Active Problem List:     Major depressive disorder, recurrent episode, severe (HCC)     Bilateral lower abdominal pain     Irritable bowel syndrome with constipation and diarrhea     Uterine leiomyoma     Mirena IUD (1/24/17)     Abnormal uterine bleeding (AUB)     Small bowel obstruction (HCC)     Essential hypertension     Seasonal allergies     Sleep difficulties     Bilateral carpal tunnel syndrome     Bipolar 1 disorder (HCC)     Atypical chest pain     Trigger finger of left thumb     Carpal tunnel syndrome of right wrist      ----Keiry Hadley

## 2022-06-09 NOTE — TELEPHONE ENCOUNTER
Please address the medication refill and close the encounter. If I can be of assistance, please route to the applicable pool. Thank you. Last visit: 5-16-22  Last Med refill: 4-22-22  Does patient have enough medication for 72 hours: No:     Next Visit Date:  No future appointments. Health Maintenance   Topic Date Due    HIV screen  Never done    Hepatitis C screen  Never done    COVID-19 Vaccine (2 - Booster for Jo-Ann series) 07/14/2021    Cervical cancer screen  12/21/2021    Diabetes screen  09/16/2022    Depression Monitoring  05/16/2023    Lipids  09/16/2024    Colorectal Cancer Screen  02/26/2029    DTaP/Tdap/Td vaccine (2 - Td or Tdap) 10/09/2029    Flu vaccine  Completed    Hepatitis A vaccine  Aged Out    Hepatitis B vaccine  Aged Out    Hib vaccine  Aged Out    Meningococcal (ACWY) vaccine  Aged Out    Pneumococcal 0-64 years Vaccine  Aged Out       Hemoglobin A1C (%)   Date Value   09/16/2019 5.5   12/21/2016 6.1 (H)   05/16/2014 5.6             ( goal A1C is < 7)   Microalb/Crt.  Ratio (mcg/mg creat)   Date Value   10/26/2011 11     LDL Cholesterol (mg/dL)   Date Value   09/16/2019 45   05/17/2014 93       (goal LDL is <100)   AST (U/L)   Date Value   06/25/2020 17     ALT (U/L)   Date Value   06/25/2020 15     BUN (mg/dL)   Date Value   03/13/2022 15     BP Readings from Last 3 Encounters:   05/16/22 132/87   03/14/22 (!) 160/101   11/22/21 (!) 148/94          (goal 120/80)    All Future Testing planned in [facility]  Lab Frequency Next Occurrence   Basic Metabolic Panel Once 26/45/6087   HIV Screen Once 11/22/2022   Hepatitis C Antibody Once 11/22/2022               Patient Active Problem List:     Major depressive disorder, recurrent episode, severe (HCC)     Bilateral lower abdominal pain     Irritable bowel syndrome with constipation and diarrhea     Uterine leiomyoma     Mirena IUD (1/24/17)     Abnormal uterine bleeding (AUB)     Small bowel obstruction ([address] Essential hypertension     Seasonal allergies     Sleep difficulties     Bilateral carpal tunnel syndrome     Bipolar 1 disorder (HCC)     Atypical chest pain     Trigger finger of left thumb     Carpal tunnel syndrome of right wrist

## 2022-06-09 NOTE — TELEPHONE ENCOUNTER
Please address the medication refill and close the encounter. If I can be of assistance, please route to the applicable pool. Thank you. Last visit: 5-16-22  Last Med refill: 3-30-22  Does patient have enough medication for 72 hours: No:     Next Visit Date:  No future appointments. Health Maintenance   Topic Date Due    HIV screen  Never done    Hepatitis C screen  Never done    COVID-19 Vaccine (2 - Booster for Jo-Ann series) 07/14/2021    Cervical cancer screen  12/21/2021    Diabetes screen  09/16/2022    Depression Monitoring  05/16/2023    Lipids  09/16/2024    Colorectal Cancer Screen  02/26/2029    DTaP/Tdap/Td vaccine (2 - Td or Tdap) 10/09/2029    Flu vaccine  Completed    Hepatitis A vaccine  Aged Out    Hepatitis B vaccine  Aged Out    Hib vaccine  Aged Out    Meningococcal (ACWY) vaccine  Aged Out    Pneumococcal 0-64 years Vaccine  Aged Out       Hemoglobin A1C (%)   Date Value   09/16/2019 5.5   12/21/2016 6.1 (H)   05/16/2014 5.6             ( goal A1C is < 7)   Microalb/Crt.  Ratio (mcg/mg creat)   Date Value   10/26/2011 11     LDL Cholesterol (mg/dL)   Date Value   09/16/2019 45   05/17/2014 93       (goal LDL is <100)   AST (U/L)   Date Value   06/25/2020 17     ALT (U/L)   Date Value   06/25/2020 15     BUN (mg/dL)   Date Value   03/13/2022 15     BP Readings from Last 3 Encounters:   05/16/22 132/87   03/14/22 (!) 160/101   11/22/21 (!) 148/94          (goal 120/80)    All Future Testing planned in [facility]  Lab Frequency Next Occurrence   Basic Metabolic Panel Once 59/01/6308   HIV Screen Once 11/22/2022   Hepatitis C Antibody Once 11/22/2022               Patient Active Problem List:     Major depressive disorder, recurrent episode, severe (HCC)     Bilateral lower abdominal pain     Irritable bowel syndrome with constipation and diarrhea     Uterine leiomyoma     Mirena IUD (1/24/17)     Abnormal uterine bleeding (AUB)     Small bowel obstruction ([address] Essential hypertension     Seasonal allergies     Sleep difficulties     Bilateral carpal tunnel syndrome     Bipolar 1 disorder (HCC)     Atypical chest pain     Trigger finger of left thumb     Carpal tunnel syndrome of right wrist

## 2022-06-13 RX ORDER — GABAPENTIN 100 MG/1
CAPSULE ORAL
Qty: 90 CAPSULE | Refills: 0 | Status: SHIPPED | OUTPATIENT
Start: 2022-06-13 | End: 2022-07-13

## 2022-06-13 RX ORDER — POLYETHYLENE GLYCOL 3350 17 G/17G
POWDER, FOR SOLUTION ORAL
Qty: 510 G | Refills: 10 | Status: SHIPPED | OUTPATIENT
Start: 2022-06-13

## 2022-06-13 RX ORDER — SERTRALINE HYDROCHLORIDE 100 MG/1
TABLET, FILM COATED ORAL
Qty: 30 TABLET | Refills: 10 | Status: SHIPPED | OUTPATIENT
Start: 2022-06-13

## 2022-07-06 DIAGNOSIS — R42 DIZZINESS: ICD-10-CM

## 2022-07-07 RX ORDER — MECLIZINE HCL 12.5 MG/1
TABLET ORAL
Qty: 90 TABLET | Refills: 10 | Status: SHIPPED | OUTPATIENT
Start: 2022-07-07

## 2022-07-07 NOTE — TELEPHONE ENCOUNTER
E-scribe request for med refills. Please review and e-scribe if applicable. Last Visit Date:  05/16/2022  Next Visit Date:  Visit date not found    Hemoglobin A1C (%)   Date Value   09/16/2019 5.5   12/21/2016 6.1 (H)   05/16/2014 5.6             ( goal A1C is < 7)   Microalb/Crt.  Ratio (mcg/mg creat)   Date Value   10/26/2011 11     LDL Cholesterol (mg/dL)   Date Value   09/16/2019 45       (goal LDL is <100)   AST (U/L)   Date Value   06/25/2020 17     ALT (U/L)   Date Value   06/25/2020 15     BUN (mg/dL)   Date Value   03/13/2022 15     BP Readings from Last 3 Encounters:   05/16/22 132/87   03/14/22 (!) 160/101   11/22/21 (!) 148/94          (goal 120/80)        Patient Active Problem List:     Major depressive disorder, recurrent episode, severe (HCC)     Bilateral lower abdominal pain     Irritable bowel syndrome with constipation and diarrhea     Uterine leiomyoma     Mirena IUD (1/24/17)     Abnormal uterine bleeding (AUB)     Small bowel obstruction (HCC)     Essential hypertension     Seasonal allergies     Sleep difficulties     Bilateral carpal tunnel syndrome     Bipolar 1 disorder (HCC)     Atypical chest pain     Trigger finger of left thumb     Carpal tunnel syndrome of right wrist      ----Satnam Rodriguez

## 2022-07-29 DIAGNOSIS — R10.30 LOWER ABDOMINAL PAIN: ICD-10-CM

## 2022-07-29 NOTE — TELEPHONE ENCOUNTER
E-scribe request for med refills. Please review and e-scribe if applicable. Last Visit Date:  5/16/22  Next Visit Date:  Visit date not found    Hemoglobin A1C (%)   Date Value   09/16/2019 5.5   12/21/2016 6.1 (H)   05/16/2014 5.6             ( goal A1C is < 7)   Microalb/Crt.  Ratio (mcg/mg creat)   Date Value   10/26/2011 11     LDL Cholesterol (mg/dL)   Date Value   09/16/2019 45       (goal LDL is <100)   AST (U/L)   Date Value   06/25/2020 17     ALT (U/L)   Date Value   06/25/2020 15     BUN (mg/dL)   Date Value   03/13/2022 15     BP Readings from Last 3 Encounters:   05/16/22 132/87   03/14/22 (!) 160/101   11/22/21 (!) 148/94          (goal 120/80)        Patient Active Problem List:     Major depressive disorder, recurrent episode, severe (HCC)     Bilateral lower abdominal pain     Irritable bowel syndrome with constipation and diarrhea     Uterine leiomyoma     Mirena IUD (1/24/17)     Abnormal uterine bleeding (AUB)     Small bowel obstruction (HCC)     Essential hypertension     Seasonal allergies     Sleep difficulties     Bilateral carpal tunnel syndrome     Bipolar 1 disorder (HCC)     Atypical chest pain     Trigger finger of left thumb     Carpal tunnel syndrome of right wrist      ----Brii Hester

## 2022-08-05 DIAGNOSIS — I10 ESSENTIAL HYPERTENSION: ICD-10-CM

## 2022-08-05 RX ORDER — AMLODIPINE BESYLATE 5 MG/1
TABLET ORAL
Qty: 30 TABLET | Refills: 10 | Status: SHIPPED | OUTPATIENT
Start: 2022-08-05

## 2022-08-05 RX ORDER — CLONIDINE HYDROCHLORIDE 0.3 MG/1
TABLET ORAL
Qty: 60 TABLET | Refills: 10 | Status: SHIPPED | OUTPATIENT
Start: 2022-08-05

## 2022-08-05 NOTE — TELEPHONE ENCOUNTER
Last visit: 05/16/22  Last Med refill: 06/09/22  Does patient have enough medication for 72 hours: No:     Next Visit Date:  No future appointments. Health Maintenance   Topic Date Due    HIV screen  Never done    Hepatitis C screen  Never done    COVID-19 Vaccine (2 - Booster for Jo-Ann series) 07/14/2021    Cervical cancer screen  12/21/2021    Flu vaccine (1) 09/01/2022    Diabetes screen  09/16/2022    Depression Monitoring  05/16/2023    Lipids  09/16/2024    Colorectal Cancer Screen  02/26/2029    DTaP/Tdap/Td vaccine (2 - Td or Tdap) 10/09/2029    Hepatitis A vaccine  Aged Out    Hepatitis B vaccine  Aged Out    Hib vaccine  Aged Out    Meningococcal (ACWY) vaccine  Aged Out    Pneumococcal 0-64 years Vaccine  Aged Out       Hemoglobin A1C (%)   Date Value   09/16/2019 5.5   12/21/2016 6.1 (H)   05/16/2014 5.6             ( goal A1C is < 7)   Microalb/Crt.  Ratio (mcg/mg creat)   Date Value   10/26/2011 11     LDL Cholesterol (mg/dL)   Date Value   09/16/2019 45   05/17/2014 93       (goal LDL is <100)   AST (U/L)   Date Value   06/25/2020 17     ALT (U/L)   Date Value   06/25/2020 15     BUN (mg/dL)   Date Value   03/13/2022 15     BP Readings from Last 3 Encounters:   05/16/22 132/87   03/14/22 (!) 160/101   11/22/21 (!) 148/94          (goal 120/80)    All Future Testing planned in [facility]  Lab Frequency Next Occurrence   Basic Metabolic Panel Once 16/16/9893   HIV Screen Once 11/22/2022   Hepatitis C Antibody Once 11/22/2022               Patient Active Problem List:     Major depressive disorder, recurrent episode, severe (HCC)     Bilateral lower abdominal pain     Irritable bowel syndrome with constipation and diarrhea     Uterine leiomyoma     Mirena IUD (1/24/17)     Abnormal uterine bleeding (AUB)     Small bowel obstruction (HCC)     Essential hypertension     Seasonal allergies     Sleep difficulties     Bilateral carpal tunnel syndrome     Bipolar 1 disorder ([address]     Atypical chest pain Trigger finger of left thumb     Carpal tunnel syndrome of right wrist

## 2022-08-05 NOTE — TELEPHONE ENCOUNTER
Please address the medication refill and close the encounter. If I can be of assistance, please route to the applicable pool. Thank you. Last visit: 5-16-22  Last Med refill: 4-22-22  Does patient have enough medication for 72 hours: No:     Next Visit Date:  No future appointments. Health Maintenance   Topic Date Due    HIV screen  Never done    Hepatitis C screen  Never done    COVID-19 Vaccine (2 - Booster for Jo-Ann series) 07/14/2021    Cervical cancer screen  12/21/2021    Flu vaccine (1) 09/01/2022    Diabetes screen  09/16/2022    Depression Monitoring  05/16/2023    Lipids  09/16/2024    Colorectal Cancer Screen  02/26/2029    DTaP/Tdap/Td vaccine (2 - Td or Tdap) 10/09/2029    Hepatitis A vaccine  Aged Out    Hepatitis B vaccine  Aged Out    Hib vaccine  Aged Out    Meningococcal (ACWY) vaccine  Aged Out    Pneumococcal 0-64 years Vaccine  Aged Out       Hemoglobin A1C (%)   Date Value   09/16/2019 5.5   12/21/2016 6.1 (H)   05/16/2014 5.6             ( goal A1C is < 7)   Microalb/Crt.  Ratio (mcg/mg creat)   Date Value   10/26/2011 11     LDL Cholesterol (mg/dL)   Date Value   09/16/2019 45   05/17/2014 93       (goal LDL is <100)   AST (U/L)   Date Value   06/25/2020 17     ALT (U/L)   Date Value   06/25/2020 15     BUN (mg/dL)   Date Value   03/13/2022 15     BP Readings from Last 3 Encounters:   05/16/22 132/87   03/14/22 (!) 160/101   11/22/21 (!) 148/94          (goal 120/80)    All Future Testing planned in [facility]  Lab Frequency Next Occurrence   Basic Metabolic Panel Once 73/43/0741   HIV Screen Once 11/22/2022   Hepatitis C Antibody Once 11/22/2022               Patient Active Problem List:     Major depressive disorder, recurrent episode, severe (HCC)     Bilateral lower abdominal pain     Irritable bowel syndrome with constipation and diarrhea     Uterine leiomyoma     Mirena IUD (1/24/17)     Abnormal uterine bleeding (AUB)     Small bowel obstruction ([address]     Essential hypertension     Seasonal allergies     Sleep difficulties     Bilateral carpal tunnel syndrome     Bipolar 1 disorder (HCC)     Atypical chest pain     Trigger finger of left thumb     Carpal tunnel syndrome of right wrist

## 2022-08-10 RX ORDER — GABAPENTIN 100 MG/1
CAPSULE ORAL
Qty: 90 CAPSULE | Refills: 0 | OUTPATIENT
Start: 2022-08-10 | End: 2022-09-09

## 2022-08-22 ENCOUNTER — OFFICE VISIT (OUTPATIENT)
Dept: ORTHOPEDIC SURGERY | Age: 52
End: 2022-08-22
Payer: COMMERCIAL

## 2022-08-22 VITALS — HEIGHT: 60 IN | WEIGHT: 192 LBS | BODY MASS INDEX: 37.69 KG/M2

## 2022-08-22 DIAGNOSIS — M65.312 TRIGGER FINGER OF LEFT THUMB: Primary | ICD-10-CM

## 2022-08-22 DIAGNOSIS — K58.2 IRRITABLE BOWEL SYNDROME WITH CONSTIPATION AND DIARRHEA: ICD-10-CM

## 2022-08-22 DIAGNOSIS — G56.01 CARPAL TUNNEL SYNDROME OF RIGHT WRIST: ICD-10-CM

## 2022-08-22 PROCEDURE — G8417 CALC BMI ABV UP PARAM F/U: HCPCS | Performed by: ORTHOPAEDIC SURGERY

## 2022-08-22 PROCEDURE — 20526 THER INJECTION CARP TUNNEL: CPT | Performed by: ORTHOPAEDIC SURGERY

## 2022-08-22 PROCEDURE — 20550 NJX 1 TENDON SHEATH/LIGAMENT: CPT | Performed by: ORTHOPAEDIC SURGERY

## 2022-08-22 PROCEDURE — G8427 DOCREV CUR MEDS BY ELIG CLIN: HCPCS | Performed by: ORTHOPAEDIC SURGERY

## 2022-08-22 PROCEDURE — 1036F TOBACCO NON-USER: CPT | Performed by: ORTHOPAEDIC SURGERY

## 2022-08-22 RX ORDER — METHYLPREDNISOLONE ACETATE 40 MG/ML
40 INJECTION, SUSPENSION INTRA-ARTICULAR; INTRALESIONAL; INTRAMUSCULAR; SOFT TISSUE ONCE
Status: COMPLETED | OUTPATIENT
Start: 2022-08-22 | End: 2022-08-22

## 2022-08-22 RX ORDER — LIDOCAINE HYDROCHLORIDE 10 MG/ML
1 INJECTION, SOLUTION INFILTRATION; PERINEURAL ONCE
Status: COMPLETED | OUTPATIENT
Start: 2022-08-22 | End: 2022-08-22

## 2022-08-22 RX ORDER — LIDOCAINE HYDROCHLORIDE 10 MG/ML
5 INJECTION, SOLUTION INFILTRATION; PERINEURAL ONCE
Status: COMPLETED | OUTPATIENT
Start: 2022-08-22 | End: 2022-08-22

## 2022-08-22 RX ADMIN — LIDOCAINE HYDROCHLORIDE 5 ML: 10 INJECTION, SOLUTION INFILTRATION; PERINEURAL at 14:53

## 2022-08-22 RX ADMIN — LIDOCAINE HYDROCHLORIDE 1 ML: 10 INJECTION, SOLUTION INFILTRATION; PERINEURAL at 14:52

## 2022-08-22 RX ADMIN — METHYLPREDNISOLONE ACETATE 40 MG: 40 INJECTION, SUSPENSION INTRA-ARTICULAR; INTRALESIONAL; INTRAMUSCULAR; SOFT TISSUE at 14:54

## 2022-08-22 NOTE — PROGRESS NOTES
This 79-year-old patient is seen here complaining of pain and swelling in the right arm. She is also complaining of clicking and pain over the left thumb. In the past the patient has had carpal tunnel and left trigger thumb injected with good results. On the right side she complains of pain in all the digits and radiates proximally towards the shoulder and sometimes to the base of the neck. She is not able to sleep on that side. The symptoms do wake her up. On the left thumb she says it please drink and is a clicking and sometimes gets painful. However she also describes pain in the left and the fingers as well. Examination: Her Sensation in both the hands is normal.  She does not have any thenar wasting. She has full range of motion in the wrist and the fingers. On the left side she has classical triggering of the left thumb. Her Phalen test was positive. On the right side her Phalen test is strongly positive. Diagnosis: Bilateral carpal tunnel syndrome. Right being more symptomatic than the left. #2 left trigger thumb. Treatment: Under sterile condition I injected 40 mg Depo-Medrol and 5 cc of 1% plain lidocaine in the right carpal tunnel without any complications. I injected 40 mg Depo-Medrol and 1 cc of 1% plain lidocaine into the tendon sheath of the left trigger thumb. Patient is not having any significant symptoms of carpal tunnel on the left side and therefore we left it alone. Patient told me that she has had EMG and nerve conduction studies carried out before and she she described to me in detail about the test but she cannot remember where it was done. I suggested to her that she call up her primary care physician's office and see if she can locate that the testing had been carried out. If she does find the if will let us know. Otherwise I will see her as needed.     If she does return with the similar symptoms and if she has not found the EMG nerve conduction

## 2022-08-23 RX ORDER — FOLIC ACID 1 MG/1
TABLET ORAL
Qty: 30 TABLET | Refills: 2 | Status: SHIPPED | OUTPATIENT
Start: 2022-08-23

## 2022-10-25 DIAGNOSIS — I10 ESSENTIAL HYPERTENSION: ICD-10-CM

## 2022-10-25 RX ORDER — LOSARTAN POTASSIUM 25 MG/1
25 TABLET ORAL DAILY
Qty: 30 TABLET | Refills: 0 | Status: SHIPPED | OUTPATIENT
Start: 2022-10-25

## 2022-10-25 NOTE — TELEPHONE ENCOUNTER
E-scribe request for med refills. Please review and e-scribe if applicable. Last Visit Date:  5/16/22  Next Visit Date:  Visit date not found    Hemoglobin A1C (%)   Date Value   09/16/2019 5.5   12/21/2016 6.1 (H)   05/16/2014 5.6             ( goal A1C is < 7)   Microalb/Crt.  Ratio (mcg/mg creat)   Date Value   10/26/2011 11     LDL Cholesterol (mg/dL)   Date Value   09/16/2019 45       (goal LDL is <100)   AST (U/L)   Date Value   06/25/2020 17     ALT (U/L)   Date Value   06/25/2020 15     BUN (mg/dL)   Date Value   03/13/2022 15     BP Readings from Last 3 Encounters:   05/16/22 132/87   03/14/22 (!) 160/101   11/22/21 (!) 148/94          (goal 120/80)        Patient Active Problem List:     Major depressive disorder, recurrent episode, severe (HCC)     Bilateral lower abdominal pain     Irritable bowel syndrome with constipation and diarrhea     Uterine leiomyoma     Mirena IUD (1/24/17)     Abnormal uterine bleeding (AUB)     Small bowel obstruction (HCC)     Essential hypertension     Seasonal allergies     Sleep difficulties     Bilateral carpal tunnel syndrome     Bipolar 1 disorder (HCC)     Atypical chest pain     Trigger finger of left thumb     Carpal tunnel syndrome of right wrist      ----Candy Knife

## 2022-11-14 DIAGNOSIS — R10.30 LOWER ABDOMINAL PAIN: ICD-10-CM

## 2022-11-15 RX ORDER — MAGNESIUM CARB/ALUMINUM HYDROX 105-160MG
TABLET,CHEWABLE ORAL
Qty: 355 ML | Refills: 10 | Status: SHIPPED | OUTPATIENT
Start: 2022-11-15

## 2022-11-15 RX ORDER — GABAPENTIN 100 MG/1
CAPSULE ORAL
Qty: 90 CAPSULE | Refills: 10 | OUTPATIENT
Start: 2022-11-15

## 2022-11-15 NOTE — TELEPHONE ENCOUNTER
Last visit:   Last Med refill:   Does patient have enough medication for 72 hours: No:     Next Visit Date:  No future appointments. Health Maintenance   Topic Date Due    HIV screen  Never done    Hepatitis C screen  Never done    COVID-19 Vaccine (2 - Booster for Jo-Ann series) 07/14/2021    Flu vaccine (1) 08/01/2022    Diabetes screen  09/16/2022    Shingles vaccine (1 of 2) Never done    Breast cancer screen  11/13/2022    Depression Monitoring  05/16/2023    Lipids  09/16/2024    Colorectal Cancer Screen  02/26/2029    DTaP/Tdap/Td vaccine (2 - Td or Tdap) 10/09/2029    Hepatitis A vaccine  Aged Out    Hib vaccine  Aged Out    Meningococcal (ACWY) vaccine  Aged Out    Pneumococcal 0-64 years Vaccine  Aged Out       Hemoglobin A1C (%)   Date Value   09/16/2019 5.5   12/21/2016 6.1 (H)   05/16/2014 5.6             ( goal A1C is < 7)   Microalb/Crt.  Ratio (mcg/mg creat)   Date Value   10/26/2011 11     LDL Cholesterol (mg/dL)   Date Value   09/16/2019 45   05/17/2014 93       (goal LDL is <100)   AST (U/L)   Date Value   06/25/2020 17     ALT (U/L)   Date Value   06/25/2020 15     BUN (mg/dL)   Date Value   03/13/2022 15     BP Readings from Last 3 Encounters:   05/16/22 132/87   03/14/22 (!) 160/101   11/22/21 (!) 148/94          (goal 120/80)    All Future Testing planned in [facility]  Lab Frequency Next Occurrence   Basic Metabolic Panel Once 60/21/2089   HIV Screen Once 11/22/2022   Hepatitis C Antibody Once 11/22/2022               Patient Active Problem List:     Major depressive disorder, recurrent episode, severe (HCC)     Bilateral lower abdominal pain     Irritable bowel syndrome with constipation and diarrhea     Uterine leiomyoma     Mirena IUD (1/24/17)     Abnormal uterine bleeding (AUB)     Small bowel obstruction (HCC)     Essential hypertension     Seasonal allergies     Sleep difficulties     Bilateral carpal tunnel syndrome     Bipolar 1 disorder (HCC)     Atypical chest pain     Trigger finger of left thumb     Carpal tunnel syndrome of right wrist           Please address the medication refill and close the encounter. If I can be of assistance, please route to the applicable pool. Thank you.

## 2022-11-20 DIAGNOSIS — K58.2 IRRITABLE BOWEL SYNDROME WITH CONSTIPATION AND DIARRHEA: ICD-10-CM

## 2022-11-21 RX ORDER — FOLIC ACID 1 MG/1
TABLET ORAL
Qty: 30 TABLET | Refills: 2 | Status: SHIPPED | OUTPATIENT
Start: 2022-11-21

## 2022-11-21 NOTE — TELEPHONE ENCOUNTER
Last visit: 5/16/22  Last Med refill: 10/23/22  Does patient have enough medication for 72 hours: Yes    Next Visit Date:  No future appointments. Health Maintenance   Topic Date Due    HIV screen  Never done    Hepatitis C screen  Never done    COVID-19 Vaccine (2 - Booster for Jo-Ann series) 07/14/2021    Flu vaccine (1) 08/01/2022    Diabetes screen  09/16/2022    Shingles vaccine (1 of 2) Never done    Breast cancer screen  11/13/2022    Depression Monitoring  05/16/2023    Lipids  09/16/2024    Colorectal Cancer Screen  02/26/2029    DTaP/Tdap/Td vaccine (2 - Td or Tdap) 10/09/2029    Hepatitis A vaccine  Aged Out    Hib vaccine  Aged Out    Meningococcal (ACWY) vaccine  Aged Out    Pneumococcal 0-64 years Vaccine  Aged Out       Hemoglobin A1C (%)   Date Value   09/16/2019 5.5   12/21/2016 6.1 (H)   05/16/2014 5.6             ( goal A1C is < 7)   Microalb/Crt.  Ratio (mcg/mg creat)   Date Value   10/26/2011 11     LDL Cholesterol (mg/dL)   Date Value   09/16/2019 45   05/17/2014 93       (goal LDL is <100)   AST (U/L)   Date Value   06/25/2020 17     ALT (U/L)   Date Value   06/25/2020 15     BUN (mg/dL)   Date Value   03/13/2022 15     BP Readings from Last 3 Encounters:   05/16/22 132/87   03/14/22 (!) 160/101   11/22/21 (!) 148/94          (goal 120/80)    All Future Testing planned in [facility]  Lab Frequency Next Occurrence   Basic Metabolic Panel Once 71/90/3011   HIV Screen Once 11/22/2022   Hepatitis C Antibody Once 11/22/2022               Patient Active Problem List:     Major depressive disorder, recurrent episode, severe (HCC)     Bilateral lower abdominal pain     Irritable bowel syndrome with constipation and diarrhea     Uterine leiomyoma     Mirena IUD (1/24/17)     Abnormal uterine bleeding (AUB)     Small bowel obstruction (HCC)     Essential hypertension     Seasonal allergies     Sleep difficulties     Bilateral carpal tunnel syndrome     Bipolar 1 disorder ([facility] [address]     Atypical chest pain Trigger finger of left thumb     Carpal tunnel syndrome of right wrist

## 2022-12-06 ENCOUNTER — OFFICE VISIT (OUTPATIENT)
Dept: ORTHOPEDIC SURGERY | Age: 52
End: 2022-12-06

## 2022-12-06 VITALS — WEIGHT: 192 LBS | BODY MASS INDEX: 37.69 KG/M2 | HEIGHT: 60 IN

## 2022-12-06 DIAGNOSIS — G56.03 BILATERAL CARPAL TUNNEL SYNDROME: ICD-10-CM

## 2022-12-06 DIAGNOSIS — G56.01 CARPAL TUNNEL SYNDROME OF RIGHT WRIST: Primary | ICD-10-CM

## 2022-12-06 RX ORDER — HYDROCODONE BITARTRATE AND ACETAMINOPHEN 5; 325 MG/1; MG/1
1 TABLET ORAL EVERY 6 HOURS PRN
Qty: 12 TABLET | Refills: 0 | Status: SHIPPED | OUTPATIENT
Start: 2022-12-06 | End: 2022-12-09

## 2022-12-06 RX ORDER — LIDOCAINE HYDROCHLORIDE 10 MG/ML
1 INJECTION, SOLUTION INFILTRATION; PERINEURAL ONCE
Status: COMPLETED | OUTPATIENT
Start: 2022-12-06 | End: 2022-12-06

## 2022-12-06 RX ORDER — METHYLPREDNISOLONE ACETATE 40 MG/ML
40 INJECTION, SUSPENSION INTRA-ARTICULAR; INTRALESIONAL; INTRAMUSCULAR; SOFT TISSUE ONCE
Status: COMPLETED | OUTPATIENT
Start: 2022-12-06 | End: 2022-12-06

## 2022-12-06 RX ADMIN — LIDOCAINE HYDROCHLORIDE 1 ML: 10 INJECTION, SOLUTION INFILTRATION; PERINEURAL at 15:36

## 2022-12-06 RX ADMIN — METHYLPREDNISOLONE ACETATE 40 MG: 40 INJECTION, SUSPENSION INTRA-ARTICULAR; INTRALESIONAL; INTRAMUSCULAR; SOFT TISSUE at 15:37

## 2022-12-06 RX ADMIN — LIDOCAINE HYDROCHLORIDE 1 ML: 10 INJECTION, SOLUTION INFILTRATION; PERINEURAL at 15:37

## 2022-12-06 NOTE — PROGRESS NOTES
This patient previously had right carpal tunnel and left trigger thumb injected in August 22. She says that the left thumb has not given her any trouble at all. Unfortunately she is having severe pain in the right and fingers and numbness and tingling which radiates proximally up to the shoulder. She has hard time sleeping at night. She has similar problem on the left side but not as severe. Examination: There is no wasting of the thenar muscles. Her sensation is little altered on the right side. Her Phalen test is strongly positive. Last time she was here she had mentioned that she did have EMG nerve conduction studies done before but that she could not locate it and she called her primary care physician who checked through the last 10 years of her notes and found no reference to the EMG. However clinically she has responded well to corticosteroid injection of the right side before. Therefore it clinically it is bilateral carpal tunnel syndrome. Diagnosis: Bilateral carpal tunnel syndrome. Treatment: Under sterile condition I injected both carpal tunnels with 40 mg Depo-Medrol and 5 cc of 1% plain lidocaine. I did tell the patient that should she have recurrence of symptoms then she would require surgical treatment without the benefit of EMG because clinically it is proven she has carpal tunnel and that should be sufficient. If the patient calls up with recurrence of symptoms then please schedule her for surgery right carpal tunnel release under local anesthesia. I have discussed this local anesthetic with her fully. Injection bilateral carpal tunnel. Had a full discussion with the patient about the injection possible risk factor for an injury to the nerve infection.     Sequential injections were carried out starting with the left side first both the injection sites were prepped with Betadine and after few minutes cleaned with alcohol and then sprayed with ethyl chloride before injecting with a 25-gauge needle inserted between the palmaris longus and ECR tendons. Initially there was only a couple of drops injected until the inside was anesthetized and then injected 40 mg Depo-Medrol and 1 cc of 1% plain lidocaine into each of the carpal tunnels. Pressure dressing was applied. Patient was to the procedure satisfactory record no complications.

## 2022-12-12 ENCOUNTER — TELEPHONE (OUTPATIENT)
Dept: FAMILY MEDICINE CLINIC | Age: 52
End: 2022-12-12

## 2022-12-21 RX ORDER — PSEUDOEPHED/ACETAMINOPH/DIPHEN 30MG-500MG
TABLET ORAL
Qty: 120 TABLET | Refills: 10 | Status: SHIPPED | OUTPATIENT
Start: 2022-12-21

## 2022-12-21 NOTE — TELEPHONE ENCOUNTER
Acetaminophen pending refill       Health Maintenance   Topic Date Due    HIV screen  Never done    Hepatitis C screen  Never done    COVID-19 Vaccine (2 - Booster for Jo-Ann series) 07/14/2021    Flu vaccine (1) 08/01/2022    Diabetes screen  09/16/2022    Breast cancer screen  Never done    Shingles vaccine (1 of 2) Never done    Depression Monitoring  05/16/2023    Lipids  09/16/2024    Colorectal Cancer Screen  02/26/2029    DTaP/Tdap/Td vaccine (2 - Td or Tdap) 10/09/2029    Hepatitis A vaccine  Aged Out    Hib vaccine  Aged Out    Meningococcal (ACWY) vaccine  Aged Out    Pneumococcal 0-64 years Vaccine  Aged Out             (applicable per patient's age: Cancer Screenings, Depression Screening, Fall Risk Screening, Immunizations)    Hemoglobin A1C (%)   Date Value   09/16/2019 5.5   12/21/2016 6.1 (H)   05/16/2014 5.6     Microalb/Crt.  Ratio (mcg/mg creat)   Date Value   10/26/2011 11     LDL Cholesterol (mg/dL)   Date Value   09/16/2019 45     AST (U/L)   Date Value   06/25/2020 17     ALT (U/L)   Date Value   06/25/2020 15     BUN (mg/dL)   Date Value   03/13/2022 15      (goal A1C is < 7)   (goal LDL is <100) need 30-50% reduction from baseline     BP Readings from Last 3 Encounters:   05/16/22 132/87   03/14/22 (!) 160/101   11/22/21 (!) 148/94    (goal /80)      All Future Testing planned in [facility]:  Lab Frequency Next Occurrence       Next Visit Date:  Future Appointments   Date Time Provider Sabrina Bingham   12/30/2022  8:30 AM Reyes Revel, MD [address] [facility]            Patient Active Problem List:     Major depressive disorder, recurrent episode, severe (HCC)     Bilateral lower abdominal pain     Irritable bowel syndrome with constipation and diarrhea     Uterine leiomyoma     Mirena IUD (1/24/17)     Abnormal uterine bleeding (AUB)     Small bowel obstruction (HCC)     Essential hypertension     Seasonal allergies     Sleep difficulties     Bilateral carpal tunnel syndrome     Bipolar 1 disorder ([facility]     Atypical chest pain     Trigger finger of left thumb     Carpal tunnel syndrome of right wrist

## 2022-12-26 DIAGNOSIS — Z76.0 MEDICATION REFILL: ICD-10-CM

## 2022-12-27 NOTE — TELEPHONE ENCOUNTER
E-scribe request for med refill. Please review and e-scribe if applicable. Last Visit Date:  05/16/2022  Next Visit Date:  12/30/2022    Hemoglobin A1C (%)   Date Value   09/16/2019 5.5   12/21/2016 6.1 (H)   05/16/2014 5.6             ( goal A1C is < 7)   Microalb/Crt.  Ratio (mcg/mg creat)   Date Value   10/26/2011 11     LDL Cholesterol (mg/dL)   Date Value   09/16/2019 45       (goal LDL is <100)   AST (U/L)   Date Value   06/25/2020 17     ALT (U/L)   Date Value   06/25/2020 15     BUN (mg/dL)   Date Value   03/13/2022 15     BP Readings from Last 3 Encounters:   05/16/22 132/87   03/14/22 (!) 160/101   11/22/21 (!) 148/94          (goal 120/80)        Patient Active Problem List:     Major depressive disorder, recurrent episode, severe (HCC)     Bilateral lower abdominal pain     Irritable bowel syndrome with constipation and diarrhea     Uterine leiomyoma     Mirena IUD (1/24/17)     Abnormal uterine bleeding (AUB)     Small bowel obstruction (HCC)     Essential hypertension     Seasonal allergies     Sleep difficulties     Bilateral carpal tunnel syndrome     Bipolar 1 disorder (HCC)     Atypical chest pain     Trigger finger of left thumb     Carpal tunnel syndrome of right wrist      ----Roni Granados

## 2022-12-30 ENCOUNTER — OFFICE VISIT (OUTPATIENT)
Dept: FAMILY MEDICINE CLINIC | Age: 52
End: 2022-12-30

## 2022-12-30 VITALS
SYSTOLIC BLOOD PRESSURE: 163 MMHG | DIASTOLIC BLOOD PRESSURE: 100 MMHG | WEIGHT: 204.2 LBS | HEART RATE: 72 BPM | BODY MASS INDEX: 39.88 KG/M2

## 2022-12-30 DIAGNOSIS — G47.9 SLEEP DIFFICULTIES: ICD-10-CM

## 2022-12-30 DIAGNOSIS — I10 ESSENTIAL HYPERTENSION: ICD-10-CM

## 2022-12-30 DIAGNOSIS — N95.1 PERIMENOPAUSAL: ICD-10-CM

## 2022-12-30 DIAGNOSIS — F33.2 SEVERE EPISODE OF RECURRENT MAJOR DEPRESSIVE DISORDER, WITHOUT PSYCHOTIC FEATURES (HCC): Primary | ICD-10-CM

## 2022-12-30 RX ORDER — SERTRALINE HYDROCHLORIDE 100 MG/1
100 TABLET, FILM COATED ORAL DAILY
Qty: 30 TABLET | Refills: 0 | Status: SHIPPED | OUTPATIENT
Start: 2022-12-30 | End: 2023-01-29

## 2022-12-30 RX ORDER — BLOOD PRESSURE TEST KIT
1 KIT MISCELLANEOUS PRN
Qty: 1 KIT | Refills: 0 | Status: SHIPPED | OUTPATIENT
Start: 2022-12-30

## 2022-12-30 RX ORDER — FAMOTIDINE 20 MG/1
20 TABLET, FILM COATED ORAL 2 TIMES DAILY
Qty: 60 TABLET | Refills: 3 | Status: SHIPPED | OUTPATIENT
Start: 2022-12-30

## 2022-12-30 RX ORDER — LOSARTAN POTASSIUM 25 MG/1
25 TABLET ORAL DAILY
Qty: 30 TABLET | Refills: 0 | Status: SHIPPED | OUTPATIENT
Start: 2022-12-30

## 2022-12-30 RX ORDER — ESTRADIOL 1 MG/1
1 TABLET ORAL DAILY
Qty: 30 TABLET | Refills: 0 | Status: CANCELLED | OUTPATIENT
Start: 2022-12-30 | End: 2023-01-29

## 2022-12-30 ASSESSMENT — PATIENT HEALTH QUESTIONNAIRE - PHQ9
3. TROUBLE FALLING OR STAYING ASLEEP: 3
9. THOUGHTS THAT YOU WOULD BE BETTER OFF DEAD, OR OF HURTING YOURSELF: 0
SUM OF ALL RESPONSES TO PHQ QUESTIONS 1-9: 15
4. FEELING TIRED OR HAVING LITTLE ENERGY: 3
5. POOR APPETITE OR OVEREATING: 2
2. FEELING DOWN, DEPRESSED OR HOPELESS: 3
1. LITTLE INTEREST OR PLEASURE IN DOING THINGS: 3
6. FEELING BAD ABOUT YOURSELF - OR THAT YOU ARE A FAILURE OR HAVE LET YOURSELF OR YOUR FAMILY DOWN: 0
SUM OF ALL RESPONSES TO PHQ QUESTIONS 1-9: 15
8. MOVING OR SPEAKING SO SLOWLY THAT OTHER PEOPLE COULD HAVE NOTICED. OR THE OPPOSITE, BEING SO FIGETY OR RESTLESS THAT YOU HAVE BEEN MOVING AROUND A LOT MORE THAN USUAL: 0
SUM OF ALL RESPONSES TO PHQ QUESTIONS 1-9: 15
SUM OF ALL RESPONSES TO PHQ QUESTIONS 1-9: 15
SUM OF ALL RESPONSES TO PHQ9 QUESTIONS 1 & 2: 6
7. TROUBLE CONCENTRATING ON THINGS, SUCH AS READING THE NEWSPAPER OR WATCHING TELEVISION: 1
10. IF YOU CHECKED OFF ANY PROBLEMS, HOW DIFFICULT HAVE THESE PROBLEMS MADE IT FOR YOU TO DO YOUR WORK, TAKE CARE OF THINGS AT HOME, OR GET ALONG WITH OTHER PEOPLE: 0

## 2022-12-30 ASSESSMENT — ENCOUNTER SYMPTOMS
SHORTNESS OF BREATH: 0
CONSTIPATION: 0
ABDOMINAL PAIN: 0
DIARRHEA: 0
WHEEZING: 0

## 2022-12-30 NOTE — PROGRESS NOTES
Visit Information    Have you changed or started any medications since your last visit including any over-the-counter medicines, vitamins, or herbal medicines? no   Are you having any side effects from any of your medications? -  no  Have you stopped taking any of your medications? Is so, why? -  no    Have you seen any other physician or provider since your last visit? No  Have you had any other diagnostic tests since your last visit? No  Have you been seen in the emergency room and/or had an admission to a hospital since we last saw you? No  Have you had your routine dental cleaning in the past 6 months? no    Have you activated your [facility] account? If not, what are your barriers?  No:      Patient Care Team:  Nellie Burgess MD as PCP - General (Family Medicine)    Medical History Review  Past Medical, Family, and Social History reviewed and does not contribute to the patient presenting condition    Health Maintenance   Topic Date Due    HIV screen  Never done    Hepatitis C screen  Never done    COVID-19 Vaccine (2 - Booster for Jo-Ann series) 07/14/2021    Flu vaccine (1) 08/01/2022    Diabetes screen  09/16/2022    Breast cancer screen  Never done    Shingles vaccine (1 of 2) Never done    Depression Monitoring  05/16/2023    Lipids  09/16/2024    Colorectal Cancer Screen  02/26/2029    DTaP/Tdap/Td vaccine (2 - Td or Tdap) 10/09/2029    Hepatitis A vaccine  Aged Out    Hib vaccine  Aged Out    Meningococcal (ACWY) vaccine  Aged Out    Pneumococcal 0-64 years Vaccine  Aged Out
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
## 2023-01-18 DIAGNOSIS — I10 ESSENTIAL HYPERTENSION: ICD-10-CM

## 2023-01-19 ENCOUNTER — TELEPHONE (OUTPATIENT)
Dept: FAMILY MEDICINE CLINIC | Age: 53
End: 2023-01-19

## 2023-01-19 NOTE — TELEPHONE ENCOUNTER
Patient contacting office to request Dr. Tony write a letter stating she can have a  dog. States she had a form sent to the office but writer was unable to locate any forms. Patient would like Dr. Tony to write a letter saying it is okay for her to have a  dog. Please advise.

## 2023-01-20 RX ORDER — LOSARTAN POTASSIUM 25 MG/1
25 TABLET ORAL DAILY
Qty: 30 TABLET | Refills: 10 | Status: SHIPPED | OUTPATIENT
Start: 2023-01-20

## 2023-01-20 NOTE — TELEPHONE ENCOUNTER
E-scribe request for med refill. Please review and e-scribe if applicable. Last Visit Date:  12/30/2022  Next Visit Date:  1/19/2023    Hemoglobin A1C (%)   Date Value   09/16/2019 5.5   12/21/2016 6.1 (H)   05/16/2014 5.6             ( goal A1C is < 7)   Microalb/Crt.  Ratio (mcg/mg creat)   Date Value   10/26/2011 11     LDL Cholesterol (mg/dL)   Date Value   09/16/2019 45       (goal LDL is <100)   AST (U/L)   Date Value   06/25/2020 17     ALT (U/L)   Date Value   06/25/2020 15     BUN (mg/dL)   Date Value   03/13/2022 15     BP Readings from Last 3 Encounters:   12/30/22 (!) 163/100   05/16/22 132/87   03/14/22 (!) 160/101          (goal 120/80)        Patient Active Problem List:     Major depressive disorder, recurrent episode, severe (HCC)     Bilateral lower abdominal pain     Irritable bowel syndrome with constipation and diarrhea     Uterine leiomyoma     Mirena IUD (1/24/17)     Abnormal uterine bleeding (AUB)     Small bowel obstruction (HCC)     Essential hypertension     Seasonal allergies     Sleep difficulties     Bilateral carpal tunnel syndrome     Bipolar 1 disorder (HCC)     Atypical chest pain     Trigger finger of left thumb     Carpal tunnel syndrome of right wrist      ----Sara Valles

## 2023-01-30 DIAGNOSIS — F33.2 SEVERE EPISODE OF RECURRENT MAJOR DEPRESSIVE DISORDER, WITHOUT PSYCHOTIC FEATURES (HCC): ICD-10-CM

## 2023-01-30 DIAGNOSIS — I10 ESSENTIAL HYPERTENSION: ICD-10-CM

## 2023-01-30 RX ORDER — CLONIDINE HYDROCHLORIDE 0.3 MG/1
TABLET ORAL
Qty: 30 TABLET | Refills: 0 | OUTPATIENT
Start: 2023-01-30

## 2023-01-30 RX ORDER — LOSARTAN POTASSIUM 25 MG/1
25 TABLET ORAL DAILY
Qty: 30 TABLET | Refills: 0 | Status: SHIPPED | OUTPATIENT
Start: 2023-01-30

## 2023-01-30 RX ORDER — SERTRALINE HYDROCHLORIDE 100 MG/1
100 TABLET, FILM COATED ORAL DAILY
Qty: 30 TABLET | Refills: 0 | Status: SHIPPED | OUTPATIENT
Start: 2023-01-30 | End: 2023-03-01

## 2023-01-30 NOTE — TELEPHONE ENCOUNTER
E-scribe request for med refill. Please review and e-scribe if applicable. Last Visit Date:  12/30/2022  Next Visit Date:  Visit date not found    Hemoglobin A1C (%)   Date Value   09/16/2019 5.5   12/21/2016 6.1 (H)   05/16/2014 5.6             ( goal A1C is < 7)   Microalb/Crt.  Ratio (mcg/mg creat)   Date Value   10/26/2011 11     LDL Cholesterol (mg/dL)   Date Value   09/16/2019 45       (goal LDL is <100)   AST (U/L)   Date Value   06/25/2020 17     ALT (U/L)   Date Value   06/25/2020 15     BUN (mg/dL)   Date Value   03/13/2022 15     BP Readings from Last 3 Encounters:   12/30/22 (!) 163/100   05/16/22 132/87   03/14/22 (!) 160/101          (goal 120/80)        Patient Active Problem List:     Major depressive disorder, recurrent episode, severe (HCC)     Bilateral lower abdominal pain     Irritable bowel syndrome with constipation and diarrhea     Uterine leiomyoma     Mirena IUD (1/24/17)     Abnormal uterine bleeding (AUB)     Small bowel obstruction (HCC)     Essential hypertension     Seasonal allergies     Sleep difficulties     Bilateral carpal tunnel syndrome     Bipolar 1 disorder (HCC)     Atypical chest pain     Trigger finger of left thumb     Carpal tunnel syndrome of right wrist      ----Shahla Jane

## 2023-02-16 DIAGNOSIS — I10 ESSENTIAL HYPERTENSION: ICD-10-CM

## 2023-02-16 DIAGNOSIS — K58.2 IRRITABLE BOWEL SYNDROME WITH CONSTIPATION AND DIARRHEA: ICD-10-CM

## 2023-02-17 RX ORDER — HYDROCHLOROTHIAZIDE 25 MG/1
TABLET ORAL
Qty: 30 TABLET | Refills: 3 | Status: SHIPPED | OUTPATIENT
Start: 2023-02-17

## 2023-02-17 RX ORDER — OMEPRAZOLE 20 MG/1
CAPSULE, DELAYED RELEASE ORAL
Qty: 30 CAPSULE | Refills: 10 | OUTPATIENT
Start: 2023-02-17

## 2023-02-17 RX ORDER — FOLIC ACID 1 MG/1
TABLET ORAL
Qty: 30 TABLET | Refills: 3 | Status: SHIPPED | OUTPATIENT
Start: 2023-02-17

## 2023-02-20 ENCOUNTER — OFFICE VISIT (OUTPATIENT)
Dept: FAMILY MEDICINE CLINIC | Age: 53
End: 2023-02-20
Payer: COMMERCIAL

## 2023-02-20 ENCOUNTER — OFFICE VISIT (OUTPATIENT)
Dept: ORTHOPEDIC SURGERY | Age: 53
End: 2023-02-20
Payer: COMMERCIAL

## 2023-02-20 VITALS — WEIGHT: 204 LBS | BODY MASS INDEX: 40.05 KG/M2 | HEIGHT: 60 IN

## 2023-02-20 VITALS — HEART RATE: 78 BPM | SYSTOLIC BLOOD PRESSURE: 161 MMHG | DIASTOLIC BLOOD PRESSURE: 100 MMHG

## 2023-02-20 DIAGNOSIS — I10 ESSENTIAL HYPERTENSION: ICD-10-CM

## 2023-02-20 DIAGNOSIS — R07.89 ATYPICAL CHEST PAIN: Primary | ICD-10-CM

## 2023-02-20 DIAGNOSIS — G56.01 CARPAL TUNNEL SYNDROME OF RIGHT WRIST: ICD-10-CM

## 2023-02-20 DIAGNOSIS — Z13.1 DIABETES MELLITUS SCREENING: ICD-10-CM

## 2023-02-20 DIAGNOSIS — Z13.9 SCREENING DUE: ICD-10-CM

## 2023-02-20 DIAGNOSIS — G56.03 BILATERAL CARPAL TUNNEL SYNDROME: Primary | ICD-10-CM

## 2023-02-20 LAB — HBA1C MFR BLD: 5.5 %

## 2023-02-20 PROCEDURE — 1036F TOBACCO NON-USER: CPT

## 2023-02-20 PROCEDURE — G8484 FLU IMMUNIZE NO ADMIN: HCPCS

## 2023-02-20 PROCEDURE — G8417 CALC BMI ABV UP PARAM F/U: HCPCS

## 2023-02-20 PROCEDURE — G8427 DOCREV CUR MEDS BY ELIG CLIN: HCPCS

## 2023-02-20 PROCEDURE — G8417 CALC BMI ABV UP PARAM F/U: HCPCS | Performed by: ORTHOPAEDIC SURGERY

## 2023-02-20 PROCEDURE — G8427 DOCREV CUR MEDS BY ELIG CLIN: HCPCS | Performed by: ORTHOPAEDIC SURGERY

## 2023-02-20 PROCEDURE — G8484 FLU IMMUNIZE NO ADMIN: HCPCS | Performed by: ORTHOPAEDIC SURGERY

## 2023-02-20 PROCEDURE — 83036 HEMOGLOBIN GLYCOSYLATED A1C: CPT

## 2023-02-20 PROCEDURE — 1036F TOBACCO NON-USER: CPT | Performed by: ORTHOPAEDIC SURGERY

## 2023-02-20 PROCEDURE — 99213 OFFICE O/P EST LOW 20 MIN: CPT | Performed by: ORTHOPAEDIC SURGERY

## 2023-02-20 PROCEDURE — 20526 THER INJECTION CARP TUNNEL: CPT | Performed by: ORTHOPAEDIC SURGERY

## 2023-02-20 PROCEDURE — 3080F DIAST BP >= 90 MM HG: CPT

## 2023-02-20 PROCEDURE — 3017F COLORECTAL CA SCREEN DOC REV: CPT | Performed by: ORTHOPAEDIC SURGERY

## 2023-02-20 PROCEDURE — 3017F COLORECTAL CA SCREEN DOC REV: CPT

## 2023-02-20 PROCEDURE — 99214 OFFICE O/P EST MOD 30 MIN: CPT

## 2023-02-20 PROCEDURE — 3077F SYST BP >= 140 MM HG: CPT

## 2023-02-20 RX ORDER — LIDOCAINE 50 MG/G
2 PATCH TOPICAL DAILY
Qty: 10 PATCH | Refills: 1 | Status: SHIPPED | OUTPATIENT
Start: 2023-02-20 | End: 2023-03-02

## 2023-02-20 RX ORDER — LIDOCAINE HYDROCHLORIDE 10 MG/ML
1 INJECTION, SOLUTION INFILTRATION; PERINEURAL ONCE
Status: COMPLETED | OUTPATIENT
Start: 2023-02-20 | End: 2023-02-20

## 2023-02-20 RX ORDER — CYCLOBENZAPRINE HCL 10 MG
10 TABLET ORAL 3 TIMES DAILY PRN
Qty: 21 TABLET | Refills: 0 | Status: SHIPPED | OUTPATIENT
Start: 2023-02-20 | End: 2023-03-02

## 2023-02-20 RX ORDER — METHYLPREDNISOLONE ACETATE 40 MG/ML
40 INJECTION, SUSPENSION INTRA-ARTICULAR; INTRALESIONAL; INTRAMUSCULAR; SOFT TISSUE ONCE
Status: COMPLETED | OUTPATIENT
Start: 2023-02-20 | End: 2023-02-20

## 2023-02-20 RX ORDER — LOSARTAN POTASSIUM 25 MG/1
50 TABLET ORAL DAILY
Qty: 30 TABLET | Refills: 0 | Status: SHIPPED | OUTPATIENT
Start: 2023-02-20

## 2023-02-20 RX ADMIN — LIDOCAINE HYDROCHLORIDE 1 ML: 10 INJECTION, SOLUTION INFILTRATION; PERINEURAL at 14:14

## 2023-02-20 RX ADMIN — METHYLPREDNISOLONE ACETATE 40 MG: 40 INJECTION, SUSPENSION INTRA-ARTICULAR; INTRALESIONAL; INTRAMUSCULAR; SOFT TISSUE at 14:14

## 2023-02-20 SDOH — ECONOMIC STABILITY: FOOD INSECURITY: WITHIN THE PAST 12 MONTHS, THE FOOD YOU BOUGHT JUST DIDN'T LAST AND YOU DIDN'T HAVE MONEY TO GET MORE.: NEVER TRUE

## 2023-02-20 SDOH — ECONOMIC STABILITY: FOOD INSECURITY: WITHIN THE PAST 12 MONTHS, YOU WORRIED THAT YOUR FOOD WOULD RUN OUT BEFORE YOU GOT MONEY TO BUY MORE.: NEVER TRUE

## 2023-02-20 SDOH — ECONOMIC STABILITY: INCOME INSECURITY: HOW HARD IS IT FOR YOU TO PAY FOR THE VERY BASICS LIKE FOOD, HOUSING, MEDICAL CARE, AND HEATING?: NOT VERY HARD

## 2023-02-20 SDOH — ECONOMIC STABILITY: HOUSING INSECURITY
IN THE LAST 12 MONTHS, WAS THERE A TIME WHEN YOU DID NOT HAVE A STEADY PLACE TO SLEEP OR SLEPT IN A SHELTER (INCLUDING NOW)?: NO

## 2023-02-20 ASSESSMENT — ENCOUNTER SYMPTOMS
COLOR CHANGE: 0
DIARRHEA: 0
SHORTNESS OF BREATH: 0
VOICE CHANGE: 0
TROUBLE SWALLOWING: 0
NAUSEA: 0
COUGH: 0
ABDOMINAL DISTENTION: 0

## 2023-02-20 ASSESSMENT — PATIENT HEALTH QUESTIONNAIRE - PHQ9
10. IF YOU CHECKED OFF ANY PROBLEMS, HOW DIFFICULT HAVE THESE PROBLEMS MADE IT FOR YOU TO DO YOUR WORK, TAKE CARE OF THINGS AT HOME, OR GET ALONG WITH OTHER PEOPLE: 0
2. FEELING DOWN, DEPRESSED OR HOPELESS: 0
3. TROUBLE FALLING OR STAYING ASLEEP: 0
7. TROUBLE CONCENTRATING ON THINGS, SUCH AS READING THE NEWSPAPER OR WATCHING TELEVISION: 0
SUM OF ALL RESPONSES TO PHQ QUESTIONS 1-9: 0
SUM OF ALL RESPONSES TO PHQ QUESTIONS 1-9: 0
1. LITTLE INTEREST OR PLEASURE IN DOING THINGS: 0
SUM OF ALL RESPONSES TO PHQ QUESTIONS 1-9: 0
4. FEELING TIRED OR HAVING LITTLE ENERGY: 0
8. MOVING OR SPEAKING SO SLOWLY THAT OTHER PEOPLE COULD HAVE NOTICED. OR THE OPPOSITE, BEING SO FIGETY OR RESTLESS THAT YOU HAVE BEEN MOVING AROUND A LOT MORE THAN USUAL: 0
9. THOUGHTS THAT YOU WOULD BE BETTER OFF DEAD, OR OF HURTING YOURSELF: 0
SUM OF ALL RESPONSES TO PHQ9 QUESTIONS 1 & 2: 0
6. FEELING BAD ABOUT YOURSELF - OR THAT YOU ARE A FAILURE OR HAVE LET YOURSELF OR YOUR FAMILY DOWN: 0
SUM OF ALL RESPONSES TO PHQ QUESTIONS 1-9: 0
5. POOR APPETITE OR OVEREATING: 0

## 2023-02-20 NOTE — PROGRESS NOTES
This patient who has previously been diagnosed with bilateral carpal tunnel syndrome is seen here because of recurrence of pain in the right arm. At her last visit she had bilateral corticosteroid injection in the carpal tunnels. She says the left wrist is not bothering her. 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 She had an episode of significant pain radiating proximally up towards the root of the neck on the right side and then it went across her chest.  She went to the emergency room where she was told that the pain was stress related. However she has made an appointment to see a cardiologist about this. Examination: Cervical spine examination shows she has full motion but all the motions were painful. He did reproduce pain in the shoulder and the right side of the neck. Right hand examination shows tenderness over the mid metatarsal area on the ulnar side. There was no swelling or any lumps. She does have difficulty making a full fist.    The Phalen test is strongly positive. X-rays: 2 views of the cervical spine show there is a straightening of the cervical lordosis. There are no major osteophyte but there may be some narrowing at C4-5 disc space. 3 views of the right hand showed no abnormality. Diagnosis: Bilateral carpal tunnel syndrome with the right side being more symptomatic now. Cervical spondylosis. #3 possible cardiac issues. Treatment: Patient called the cardiologist office this morning and was told that she has to be referred by primary care physician and she has an appointment made to see the PCP this afternoon. In the meantime I have injected the right carpal tunnel with 40 mg of Depo-Medrol and 1 cc of 1% plain lidocaine under sterile condition.     Discussed with the patient that should the symptoms be resolved with the injection then it would mean that the pain in the neck is radiating from the carpal tunnel. If not then I would refer her for physical therapy for cervical spine because she may have 2 separate issues here. She will also let us know if the injection has helped her not within the next 3 days. She is going to call the office after she has had cardiac clearance to be scheduled for right carpal tunnel release.

## 2023-02-20 NOTE — PROGRESS NOTES
Attending Physician Statement  I have discussed the care of Emre Mead, 50 y.o. female,including pertinent history and exam findings,  with the resident Dr. Saad Weston MD.  History:  Chief Complaint   Patient presents with    Chest Pain     Went to ED 2/15, was given Morphine and was fine, was given gabapentin which is not helping       Neck Pain     Patient seen and examined with resident present in the room. I have reviewed the key elements of the encounter with the resident. Examination was done by resident as documented in residents note. BP Readings from Last 3 Encounters:   02/20/23 (!) 161/100   12/30/22 (!) 163/100   05/16/22 132/87     BP (!) 161/100   Pulse 78   Lab Results   Component Value Date    WBC 6.9 05/16/2022    HGB 12.3 05/16/2022    HCT 39.2 05/16/2022     05/16/2022    CHOL 146 09/16/2019    TRIG 154 (H) 09/16/2019    HDL 70 09/16/2019    ALT 15 06/25/2020    AST 17 06/25/2020     03/13/2022    K 3.7 03/13/2022     03/13/2022    CREATININE 0.64 03/13/2022    BUN 15 03/13/2022    CO2 23 03/13/2022    TSH 1.03 12/21/2016    LABA1C 5.5 02/20/2023    LABMICR 11 10/26/2011     Lab Results   Component Value Date    CALCIUM 9.0 03/13/2022    PHOS 2.6 06/26/2020     Lab Results   Component Value Date    LDLCHOLESTEROL 45 09/16/2019     I agree with the assessment, plan and diagnosis of    Diagnosis Orders   1. Atypical chest pain  cyclobenzaprine (FLEXERIL) 10 MG tablet    lidocaine (LIDODERM) 5 %    [facility]      2. Essential hypertension  losartan (COZAAR) 25 MG tablet    DANUTA - Buzz Bernardo DO, Cardiology, [place]      3. Screening due  HIV Screen    Hepatitis C Antibody      4. Diabetes mellitus screening  POCT glycosylated hemoglobin (Hb A1C)        I agree with  orders as documented by the resident. Recommendations: Agree with resident A&P.   Will have patient keep BP log, refer to Cardiology, stop HRT as patient states no benefit with medication and refer to pain management for possible nerve block. Patient also indicated possible future carpal tunnel release surgery and patient made aware she is moderate risk for surgery with uncontrolled BP with low risk procedure in setting of low risk stress test a year ago. Patient to discuss further with cardiology. Close follow up in 2-4 weeks for blood pressure mgmt and will consider SNRI for hot flashes if patient low risk from cardiac stand point and OK from psychiatry stand point especially with patient being on SSRI. Return in about 4 weeks (around 3/20/2023).    (Daniaswapna Singh ) Dr. Petrina Oppenheim, MD

## 2023-02-20 NOTE — PROGRESS NOTES
Subjective:    Ammon Barba is a 50 y.o. female with  has a past medical history of Bipolar disorder ([address] Depression, Gastritis, Hypertension, and IBS (irritable bowel syndrome). Presented to the office today for:  Chief Complaint   Patient presents with    Chest Pain     Went to ED 2/15, was given Morphine and was fine, was given gabapentin which is not helping       Neck Pain       HPI    Alhaji Barnett is a 50 y.o. female with medical history of bilateral carpal tunnel syndrome for which she follows orthopedic surgery and received bilateral corticosteroid injection in the carpal tunnels. She presents to the office with complain of neck, back and chest pain. She reports that it is a longstanding issue, the pain is constant, stabbing in nature and aggravated with movements that interfere with patient ability to perform activities of daily living. She denies having associated palpitation, sweating or shortness of breath. Review of Systems   Constitutional:  Negative for chills, fatigue and fever. HENT:  Negative for congestion, mouth sores, trouble swallowing and voice change. Respiratory:  Negative for cough and shortness of breath. Cardiovascular:  Positive for chest pain. Negative for leg swelling. Gastrointestinal:  Negative for abdominal distention, diarrhea and nausea. Genitourinary:  Negative for difficulty urinating and hematuria. Skin:  Negative for color change. Neurological:  Negative for headaches. Psychiatric/Behavioral:  Negative for agitation and behavioral problems. The patient has a   Family History   Problem Relation Age of Onset    Diabetes Mother        Objective:    BP (!) 161/100   Pulse 78    BP Readings from Last 3 Encounters:   02/20/23 (!) 161/100   12/30/22 (!) 163/100   05/16/22 132/87       Physical Exam  Constitutional:       Appearance: She is not diaphoretic. HENT:      Head: Normocephalic and atraumatic. Right Ear:  There is no impacted cerumen. Left Ear: There is no impacted cerumen. Nose: No congestion or rhinorrhea. Eyes:      General: No scleral icterus. Right eye: No discharge. Left eye: No discharge. Cardiovascular:      Rate and Rhythm: Normal rate and regular rhythm. Heart sounds: No murmur heard. Pulmonary:      Effort: Pulmonary effort is normal.      Breath sounds: No stridor. No rhonchi. Chest:      Chest wall: Tenderness present. Abdominal:      General: There is no distension. Palpations: Abdomen is soft. Tenderness: There is no abdominal tenderness. Musculoskeletal:         General: Tenderness present. No swelling. Right lower leg: No edema. Left lower leg: No edema. Comments: Full range of motion in the cervical spine. Tenderness upon palpation in the paraspinal area around T5-T7 as well as over the anterior chest on the right side at the same level   Right wrist is fixated with splint that holding the joint in a neutral position. Skin:     Coloration: Skin is not jaundiced. Findings: No rash. Neurological:      Mental Status: She is alert and oriented to person, place, and time. Psychiatric:         Mood and Affect: Mood normal.         Behavior: Behavior normal.       Lab Results   Component Value Date    WBC 6.9 05/16/2022    HGB 12.3 05/16/2022    HCT 39.2 05/16/2022     05/16/2022    CHOL 146 09/16/2019    TRIG 154 (H) 09/16/2019    HDL 70 09/16/2019    ALT 15 06/25/2020    AST 17 06/25/2020     03/13/2022    K 3.7 03/13/2022     03/13/2022    CREATININE 0.64 03/13/2022    BUN 15 03/13/2022    CO2 23 03/13/2022    TSH 1.03 12/21/2016    LABA1C 5.5 09/16/2019    LABMICR 11 10/26/2011     Lab Results   Component Value Date    CALCIUM 9.0 03/13/2022    PHOS 2.6 06/26/2020     Lab Results   Component Value Date    LDLCHOLESTEROL 45 09/16/2019       Assessment and Plan:    1.  Essential hypertension  - BP on today's appointment is 161/100 and she reports having BP readings up to 180s at home  - patient was advised to keep measure her BP at home every day and  write down the results and to bring for the next appointment. She also was instructed regarding the impotence of taking her medication regularly. Patient verbalized understanding    - will increase losartan (COZAAR) dosage from 25 MG to 50 mg daily  - f/u appointment ib 4 weeks     2. Screening due  - HIV Screen; Future  - Hepatitis C Antibody; Future    3. Diabetes mellitus screening  - POCT glycosylated hemoglobin (Hb A1C) 5.5    4. Atypical chest pain most likely musculoskeletal in nature  - longstanding issue, pain is reproducible on palpation and is aggravated with movement   - cyclobenzaprine (FLEXERIL) 10 MG tablet; Take 1 tablet by mouth 3 times daily as needed for Muscle spasms  Dispense: 21 tablet; Refill: 0  - lidocaine (LIDODERM) 5 %; Place 2 patches onto the skin daily for 10 days 12 hours on, 12 hours off. Dispense: 10 patch; Refill: 1  - she was provided with a referral to [facility]  -- patient was also provided with referral for cardiac evaluation as requested by orthopedic surgeon in as part of pre-surgical clearance [address] [facility] [address], [address], DO, Cardiology, [facility]    5. Pre-menopausal syndrome   - Estradiol 1 mg was discontinued since patient reports that it is not alleviating her symptoms      Requested Prescriptions     Signed Prescriptions Disp Refills    losartan (COZAAR) 25 MG tablet 30 tablet 0     Sig: Take 2 tablets by mouth daily    cyclobenzaprine (FLEXERIL) 10 MG tablet 21 tablet 0     Sig: Take 1 tablet by mouth 3 times daily as needed for Muscle spasms    lidocaine (LIDODERM) 5 % 10 patch 1     Sig: Place 2 patches onto the skin daily for 10 days 12 hours on, 12 hours off.        Medications Discontinued During This Encounter   Medication Reason    estradiol (ESTRACE) 1 MG tablet     losartan (COZAAR) 25 MG tablet        Marnie davila counseling on the following healthy behaviors: nutrition, exercise and medication adherence    Discussed use,benefit, and side effects of prescribed medications. Barriers to medication compliance addressed. All patient questions answered. Pt voiced understanding. Return in about 4 weeks (around 3/20/2023). Disclaimer: Some orall of this note was transcribed using voice-recognition software. This may cause typographical errors occasionally. Although all effort is made to fix these errors, please do not hesitate to contact our office if there Ford Peers concern with the understanding of this note.

## 2023-02-20 NOTE — ADDENDUM NOTE
Addended by: Roel Johnson on: 2/20/2023 12:14 PM     Modules accepted: Orders LT PLEURAL EFFUSION, DIAGNOSTIC AND THERAPEUTIC LEFT THORACENTESIS REQUESTED    RADIOLOGIST:   SONOGRAPHER: MIKAL     US FINDINGS:  CONSENT OBTAINED WITH  AT 1320. SMALL LEFT PLEURAL EFFUSION IDENTIFIED ON ULTRASOUND AND SAFE SITE MARKED TO LEFT LATERAL BACK. TIME OUT TAKEN AT 13:22. -360 CC CLEAR YELLOW COLORED LEFT PLEURAL FLUID, ALL FLUID TAKEN TO LABORATORY FOR ANALYSIS. PT TOLERATED PROCEDURE WELL. SMALL BAND-AID NOW PLACED OVER PROCEDURE SITE. VITALS MONITORED AND RECORDED IN EPIC. PT DURING THE PROCEDURE GOT MINIMALLY HYPOTENSIVE (90'S/50'S) PT STATED SHE FELT FINE AND SHE USUALLY IS SLIGHTLY HYPOTENSIVE. AFTER THE PROCEDURE HER VITALS WENT BACK TO WHERE WE BEGAN INITIALLY. POST PROCEDURE XRAY ORDERED AND COMPLETED IN ULTRASOUND ROOM BEFORE RETURNING TO FLOOR. PT BEING TAKEN BACK TO ROOM VIA TRANSPORT, CURRENTLY IN STABLE CONDITION.

## 2023-02-22 DIAGNOSIS — K58.2 IRRITABLE BOWEL SYNDROME WITH CONSTIPATION AND DIARRHEA: ICD-10-CM

## 2023-02-23 RX ORDER — OMEPRAZOLE 20 MG/1
CAPSULE, DELAYED RELEASE ORAL
Qty: 30 CAPSULE | Refills: 10 | OUTPATIENT
Start: 2023-02-23

## 2023-02-28 DIAGNOSIS — K58.2 IRRITABLE BOWEL SYNDROME WITH CONSTIPATION AND DIARRHEA: ICD-10-CM

## 2023-03-01 RX ORDER — OMEPRAZOLE 20 MG/1
CAPSULE, DELAYED RELEASE ORAL
Qty: 30 CAPSULE | Refills: 10 | OUTPATIENT
Start: 2023-03-01

## 2023-03-06 DIAGNOSIS — K58.2 IRRITABLE BOWEL SYNDROME WITH CONSTIPATION AND DIARRHEA: ICD-10-CM

## 2023-03-07 RX ORDER — OMEPRAZOLE 20 MG/1
CAPSULE, DELAYED RELEASE ORAL
Qty: 30 CAPSULE | Refills: 10 | Status: SHIPPED | OUTPATIENT
Start: 2023-03-07

## 2023-03-07 NOTE — TELEPHONE ENCOUNTER
E-scribe request for med refill. Please review and e-scribe if applicable. Last Visit Date:  02/20/2023  Next Visit Date:  Visit date not found    Hemoglobin A1C (%)   Date Value   02/20/2023 5.5   09/16/2019 5.5   12/21/2016 6.1 (H)             ( goal A1C is < 7)   Microalb/Crt.  Ratio (mcg/mg creat)   Date Value   10/26/2011 11     LDL Cholesterol (mg/dL)   Date Value   09/16/2019 45       (goal LDL is <100)   AST (U/L)   Date Value   06/25/2020 17     ALT (U/L)   Date Value   06/25/2020 15     BUN (mg/dL)   Date Value   03/13/2022 15     BP Readings from Last 3 Encounters:   02/20/23 (!) 161/100   12/30/22 (!) 163/100   05/16/22 132/87          (goal 120/80)        Patient Active Problem List:     Major depressive disorder, recurrent episode, severe (HCC)     Bilateral lower abdominal pain     Irritable bowel syndrome with constipation and diarrhea     Uterine leiomyoma     Mirena IUD (1/24/17)     Abnormal uterine bleeding (AUB)     Small bowel obstruction (HCC)     Essential hypertension     Seasonal allergies     Sleep difficulties     Bilateral carpal tunnel syndrome     Bipolar 1 disorder (HCC)     Atypical chest pain     Trigger finger of left thumb     Carpal tunnel syndrome of right wrist      ----Jessica Meade

## 2023-03-29 DIAGNOSIS — Z76.0 MEDICATION REFILL: ICD-10-CM

## 2023-03-29 RX ORDER — ALBUTEROL SULFATE 90 UG/1
AEROSOL, METERED RESPIRATORY (INHALATION)
Qty: 18 G | Refills: 3 | Status: SHIPPED | OUTPATIENT
Start: 2023-03-29

## 2023-03-29 NOTE — TELEPHONE ENCOUNTER
(AUB)     Small bowel obstruction (HCC)     Essential hypertension     Seasonal allergies     Sleep difficulties     Bilateral carpal tunnel syndrome     Bipolar 1 disorder (HCC)     Atypical chest pain     Trigger finger of left thumb     Carpal tunnel syndrome of right wrist

## 2023-04-04 ENCOUNTER — TELEPHONE (OUTPATIENT)
Dept: FAMILY MEDICINE CLINIC | Age: 53
End: 2023-04-04

## 2023-04-09 DIAGNOSIS — K58.2 IRRITABLE BOWEL SYNDROME WITH CONSTIPATION AND DIARRHEA: ICD-10-CM

## 2023-04-10 RX ORDER — MONTELUKAST SODIUM 4 MG/1
TABLET, CHEWABLE ORAL
Qty: 30 TABLET | Refills: 2 | Status: SHIPPED | OUTPATIENT
Start: 2023-04-10

## 2023-04-19 DIAGNOSIS — Z76.0 MEDICATION REFILL: ICD-10-CM

## 2023-04-19 RX ORDER — LORATADINE 10 MG/1
TABLET ORAL
Qty: 30 TABLET | Refills: 10 | Status: SHIPPED | OUTPATIENT
Start: 2023-04-19

## 2023-04-19 NOTE — TELEPHONE ENCOUNTER
allergies     Sleep difficulties     Bilateral carpal tunnel syndrome     Bipolar 1 disorder (HCC)     Atypical chest pain     Trigger finger of left thumb     Carpal tunnel syndrome of right wrist

## 2023-04-26 RX ORDER — POLYETHYLENE GLYCOL 3350 17 G/17G
POWDER, FOR SOLUTION ORAL
Qty: 510 G | Refills: 10 | Status: SHIPPED | OUTPATIENT
Start: 2023-04-26

## 2023-04-26 NOTE — TELEPHONE ENCOUNTER
E-scribe request for Glycolax. Please review and e-scribe if applicable. Last Visit Date:  [id number]  Next Visit Date:  Visit date not found    Hemoglobin A1C (%)   Date Value   02/20/2023 5.5   09/16/2019 5.5   12/21/2016 6.1 (H)             ( goal A1C is < 7)   Microalb/Crt.  Ratio (mcg/mg creat)   Date Value   10/26/2011 11     LDL Cholesterol (mg/dL)   Date Value   09/16/2019 45       (goal LDL is <100)   AST (U/L)   Date Value   06/25/2020 17     ALT (U/L)   Date Value   06/25/2020 15     BUN (mg/dL)   Date Value   03/13/2022 15     BP Readings from Last 3 Encounters:   02/20/23 (!) 161/100   12/30/22 (!) 163/100   05/16/22 132/87          (goal 120/80)        Patient Active Problem List:     Major depressive disorder, recurrent episode, severe (HCC)     Bilateral lower abdominal pain     Irritable bowel syndrome with constipation and diarrhea     Uterine leiomyoma     Mirena IUD (1/24/17)     Abnormal uterine bleeding (AUB)     Small bowel obstruction (HCC)     Essential hypertension     Seasonal allergies     Sleep difficulties     Bilateral carpal tunnel syndrome     Bipolar 1 disorder (HCC)     Atypical chest pain     Trigger finger of left thumb     Carpal tunnel syndrome of right wrist

## 2023-05-07 DIAGNOSIS — F33.2 SEVERE EPISODE OF RECURRENT MAJOR DEPRESSIVE DISORDER, WITHOUT PSYCHOTIC FEATURES (HCC): ICD-10-CM

## 2023-05-08 RX ORDER — SERTRALINE HYDROCHLORIDE 100 MG/1
TABLET, FILM COATED ORAL
Qty: 30 TABLET | Refills: 10 | Status: SHIPPED | OUTPATIENT
Start: 2023-05-08

## 2023-05-08 NOTE — TELEPHONE ENCOUNTER
Please address the medication refill and close the encounter. If I can be of assistance, please route to the applicable pool. Thank you. Last visit: 2-20-23  Last Med refill: 3-1-23  Does patient have enough medication for 72 hours: No:     Next Visit Date:  Future Appointments   Date Time Provider Sabrina Bingham   6/5/2023  8:45 AM Jen Garcia MD [facility]       Health Maintenance   Topic Date Due    HIV screen  Never done    Hepatitis C screen  Never done    COVID-19 Vaccine (2 - Booster for Jo-Ann series) 07/14/2021    Breast cancer screen  Never done    Shingles vaccine (1 of 2) Never done    Flu vaccine (Season Ended) 08/01/2023    Depression Monitoring  02/20/2024    Lipids  09/16/2024    Diabetes screen  02/20/2026    Colorectal Cancer Screen  02/26/2029    DTaP/Tdap/Td vaccine (2 - Td or Tdap) 10/09/2029    Hepatitis A vaccine  Aged Out    Hib vaccine  Aged Out    Meningococcal (ACWY) vaccine  Aged Out    Pneumococcal 0-64 years Vaccine  Aged Out    Cervical cancer screen  Discontinued       Hemoglobin A1C (%)   Date Value   02/20/2023 5.5   09/16/2019 5.5   12/21/2016 6.1 (H)             ( goal A1C is < 7)   Microalb/Crt.  Ratio (mcg/mg creat)   Date Value   10/26/2011 11     LDL Cholesterol (mg/dL)   Date Value   09/16/2019 45   05/17/2014 93       (goal LDL is <100)   AST (U/L)   Date Value   06/25/2020 17     ALT (U/L)   Date Value   06/25/2020 15     BUN (mg/dL)   Date Value   03/13/2022 15     BP Readings from Last 3 Encounters:   02/20/23 (!) 161/100   12/30/22 (!) 163/100   05/16/22 132/87          (goal 120/80)    All Future Testing planned in [facility]  Lab Frequency Next Occurrence   HIV Screen Once 02/20/2023   Hepatitis C Antibody Once 02/20/2023               Patient Active Problem List:     Major depressive disorder, recurrent episode, severe (HCC)     Bilateral lower abdominal pain     Irritable bowel syndrome with constipation and diarrhea     Uterine

## 2023-05-17 DIAGNOSIS — R42 DIZZINESS: ICD-10-CM

## 2023-05-18 RX ORDER — MECLIZINE HCL 12.5 MG/1
TABLET ORAL
Qty: 90 TABLET | Refills: 10 | OUTPATIENT
Start: 2023-05-18

## 2023-05-23 DIAGNOSIS — R42 DIZZINESS: ICD-10-CM

## 2023-05-24 RX ORDER — MECLIZINE HCL 12.5 MG/1
TABLET ORAL
Qty: 90 TABLET | Refills: 10 | OUTPATIENT
Start: 2023-05-24

## 2023-05-29 DIAGNOSIS — R42 DIZZINESS: ICD-10-CM

## 2023-05-30 RX ORDER — MECLIZINE HCL 12.5 MG/1
TABLET ORAL
Qty: 90 TABLET | Refills: 10 | OUTPATIENT
Start: 2023-05-30

## 2023-06-04 DIAGNOSIS — R42 DIZZINESS: ICD-10-CM

## 2023-06-05 DIAGNOSIS — R42 DIZZINESS: ICD-10-CM

## 2023-06-05 RX ORDER — MECLIZINE HCL 12.5 MG/1
TABLET ORAL
Qty: 90 TABLET | Refills: 10 | OUTPATIENT
Start: 2023-06-05

## 2023-06-05 NOTE — TELEPHONE ENCOUNTER
Last visit: 02/20/23  Last Med refill: 07/07/22  Does patient have enough medication for 72 hours: No:     Next Visit Date:  Future Appointments   Date Time Provider [address]   8/21/2023  8:30 AM Peggy Garcia MD [facility]       Health Maintenance   Topic Date Due    HIV screen  Never done    Hepatitis C screen  Never done    COVID-19 Vaccine (2 - Booster for Jo-Ann series) 07/14/2021    Breast cancer screen  Never done    Shingles vaccine (1 of 2) Never done    Flu vaccine (Season Ended) 08/01/2023    Depression Monitoring  02/20/2024    Lipids  09/16/2024    Diabetes screen  02/20/2026    Colorectal Cancer Screen  02/26/2029    DTaP/Tdap/Td vaccine (2 - Td or Tdap) 10/09/2029    Hepatitis A vaccine  Aged Out    Hib vaccine  Aged Out    Meningococcal (ACWY) vaccine  Aged Out    Pneumococcal 0-64 years Vaccine  Aged Out    Cervical cancer screen  Discontinued       Hemoglobin A1C (%)   Date Value   02/20/2023 5.5   09/16/2019 5.5   12/21/2016 6.1 (H)             ( goal A1C is < 7)   Microalb/Crt.  Ratio (mcg/mg creat)   Date Value   10/26/2011 11     LDL Cholesterol (mg/dL)   Date Value   09/16/2019 45   05/17/2014 93       (goal LDL is <100)   AST (U/L)   Date Value   06/25/2020 17     ALT (U/L)   Date Value   06/25/2020 15     BUN (mg/dL)   Date Value   03/13/2022 15     BP Readings from Last 3 Encounters:   06/05/23 138/70   02/20/23 (!) 161/100   12/30/22 (!) 163/100          (goal 120/80)    All Future Testing planned in [facility]  Lab Frequency Next Occurrence   HIV Screen Once 02/20/2023   Hepatitis C Antibody Once 02/20/2023   CTA CORON EJECT [address] Once 06/05/2023   Lipid Panel Once 06/05/2023   AST Once 06/05/2023   ALT Once 06/05/2023               Patient Active Problem List:     Major depressive disorder, recurrent episode, severe (HCC)     Bilateral lower abdominal pain     Irritable bowel syndrome with constipation and diarrhea     Uterine leiomyoma     Mirena IUD

## 2023-06-19 DIAGNOSIS — K58.2 IRRITABLE BOWEL SYNDROME WITH CONSTIPATION AND DIARRHEA: ICD-10-CM

## 2023-06-19 DIAGNOSIS — I10 ESSENTIAL HYPERTENSION: ICD-10-CM

## 2023-06-19 RX ORDER — HYDROCHLOROTHIAZIDE 25 MG/1
TABLET ORAL
Qty: 30 TABLET | Refills: 2 | Status: SHIPPED | OUTPATIENT
Start: 2023-06-19

## 2023-06-19 RX ORDER — CLONIDINE HYDROCHLORIDE 0.3 MG/1
TABLET ORAL
Qty: 60 TABLET | Refills: 2 | Status: SHIPPED | OUTPATIENT
Start: 2023-06-19

## 2023-06-19 RX ORDER — FOLIC ACID 1 MG/1
TABLET ORAL
Qty: 30 TABLET | Refills: 2 | Status: SHIPPED | OUTPATIENT
Start: 2023-06-19

## 2023-06-19 NOTE — TELEPHONE ENCOUNTER
E-scribe request for CLONIDINE 0.3 MG TAB. Please review and e-scribe if applicable. Last Visit Date:  2/20/2023  Next Visit Date:  Visit date not found    Hemoglobin A1C (%)   Date Value   02/20/2023 5.5   09/16/2019 5.5   12/21/2016 6.1 (H)             ( goal A1C is < 7)   Microalb/Crt.  Ratio (mcg/mg creat)   Date Value   10/26/2011 11     LDL Cholesterol (mg/dL)   Date Value   09/16/2019 45       (goal LDL is <100)   AST (U/L)   Date Value   06/25/2020 17     ALT (U/L)   Date Value   06/25/2020 15     BUN (mg/dL)   Date Value   03/13/2022 15     BP Readings from Last 3 Encounters:   06/05/23 138/70   02/20/23 (!) 161/100   12/30/22 (!) 163/100          (goal 120/80)        Patient Active Problem List:     Major depressive disorder, recurrent episode, severe (HCC)     Bilateral lower abdominal pain     Irritable bowel syndrome with constipation and diarrhea     Uterine leiomyoma     Mirena IUD (1/24/17)     Abnormal uterine bleeding (AUB)     Small bowel obstruction (HCC)     Essential hypertension     Seasonal allergies     Sleep difficulties     Bilateral carpal tunnel syndrome     Bipolar 1 disorder (HCC)     Atypical chest pain     Trigger finger of left thumb     Carpal tunnel syndrome of right wrist      ----JF

## 2023-06-19 NOTE — TELEPHONE ENCOUNTER
E-scribe request for PENDED MEDICATIONS. Please review and e-scribe if applicable. Last Visit Date:  2/20/2023  Next Visit Date:  6/19/2023    Hemoglobin A1C (%)   Date Value   02/20/2023 5.5   09/16/2019 5.5   12/21/2016 6.1 (H)             ( goal A1C is < 7)   Microalb/Crt.  Ratio (mcg/mg creat)   Date Value   10/26/2011 11     LDL Cholesterol (mg/dL)   Date Value   09/16/2019 45       (goal LDL is <100)   AST (U/L)   Date Value   06/25/2020 17     ALT (U/L)   Date Value   06/25/2020 15     BUN (mg/dL)   Date Value   03/13/2022 15     BP Readings from Last 3 Encounters:   06/05/23 138/70   02/20/23 (!) 161/100   12/30/22 (!) 163/100          (goal 120/80)        Patient Active Problem List:     Major depressive disorder, recurrent episode, severe (HCC)     Bilateral lower abdominal pain     Irritable bowel syndrome with constipation and diarrhea     Uterine leiomyoma     Mirena IUD (1/24/17)     Abnormal uterine bleeding (AUB)     Small bowel obstruction (HCC)     Essential hypertension     Seasonal allergies     Sleep difficulties     Bilateral carpal tunnel syndrome     Bipolar 1 disorder (HCC)     Atypical chest pain     Trigger finger of left thumb     Carpal tunnel syndrome of right wrist      ----JF

## 2023-06-20 RX ORDER — CLONIDINE HYDROCHLORIDE 0.3 MG/1
TABLET ORAL
Qty: 60 TABLET | Refills: 10 | OUTPATIENT
Start: 2023-06-20

## 2023-06-20 RX ORDER — FOLIC ACID 1 MG/1
TABLET ORAL
Qty: 30 TABLET | Refills: 10 | OUTPATIENT
Start: 2023-06-20

## 2023-06-20 RX ORDER — HYDROCHLOROTHIAZIDE 25 MG/1
TABLET ORAL
Qty: 30 TABLET | Refills: 10 | OUTPATIENT
Start: 2023-06-20

## 2023-07-06 DIAGNOSIS — I10 ESSENTIAL HYPERTENSION: ICD-10-CM

## 2023-07-06 DIAGNOSIS — K58.2 IRRITABLE BOWEL SYNDROME WITH CONSTIPATION AND DIARRHEA: ICD-10-CM

## 2023-07-07 DIAGNOSIS — I10 ESSENTIAL HYPERTENSION: ICD-10-CM

## 2023-07-07 RX ORDER — AMLODIPINE BESYLATE 5 MG/1
TABLET ORAL
Qty: 30 TABLET | Refills: 10 | OUTPATIENT
Start: 2023-07-07

## 2023-07-07 NOTE — TELEPHONE ENCOUNTER
Last visit: 02/20/2023  Last Med refill: 04/10/2023  Does patient have enough medication for 72 hours: No:     Next Visit Date:  Future Appointments   Date Time Provider [address]   7/17/2023  8:50 AM Nathanael Crawford MD [facility]   8/21/2023  8:30 AM Scout Rivas MD [facility]       Health Maintenance   Topic Date Due    HIV screen  Never done    Hepatitis C screen  Never done    COVID-19 Vaccine (2 - Booster for Jo-Ann series) 07/14/2021    Breast cancer screen  Never done    Shingles vaccine (1 of 2) Never done    Flu vaccine (1) 08/01/2023    Depression Monitoring  02/20/2024    Lipids  09/16/2024    Diabetes screen  02/20/2026    Colorectal Cancer Screen  02/26/2029    DTaP/Tdap/Td vaccine (2 - Td or Tdap) 10/09/2029    Hepatitis A vaccine  Aged Out    Hib vaccine  Aged Out    Meningococcal (ACWY) vaccine  Aged Out    Pneumococcal 0-64 years Vaccine  Aged Out    Cervical cancer screen  Discontinued       Hemoglobin A1C (%)   Date Value   02/20/2023 5.5   09/16/2019 5.5   12/21/2016 6.1 (H)             ( goal A1C is < 7)   Microalb/Crt.  Ratio (mcg/mg creat)   Date Value   10/26/2011 11     LDL Cholesterol (mg/dL)   Date Value   09/16/2019 45   05/17/2014 93       (goal LDL is <100)   AST (U/L)   Date Value   06/25/2020 17     ALT (U/L)   Date Value   06/25/2020 15     BUN (mg/dL)   Date Value   03/13/2022 15     BP Readings from Last 3 Encounters:   06/05/23 138/70   02/20/23 (!) 161/100   12/30/22 (!) 163/100          (goal 120/80)    All Future Testing planned in [facility]  Lab Frequency Next Occurrence   HIV Screen Once 02/20/2023   Hepatitis C Antibody Once 02/20/2023   CTA Julellie Shirin EJECT [address] Once 06/05/2023   Lipid Panel Once 06/05/2023   AST Once 06/05/2023   ALT Once 06/05/2023               Patient Active Problem List:     Major depressive disorder, recurrent episode, severe (HCC)     Bilateral lower abdominal pain     Irritable bowel syndrome with constipation

## 2023-07-09 RX ORDER — MONTELUKAST SODIUM 4 MG/1
TABLET, CHEWABLE ORAL
Qty: 30 TABLET | Refills: 2 | Status: SHIPPED | OUTPATIENT
Start: 2023-07-09

## 2023-07-09 RX ORDER — AMLODIPINE BESYLATE 5 MG/1
5 TABLET ORAL DAILY
Qty: 30 TABLET | Refills: 2 | Status: SHIPPED | OUTPATIENT
Start: 2023-07-09 | End: 2023-08-11

## 2023-07-13 DIAGNOSIS — R42 DIZZINESS: ICD-10-CM

## 2023-07-13 NOTE — TELEPHONE ENCOUNTER
E-scribe request for ANTIVERT 12.5 MG TAB. Please review and e-scribe if applicable. Last Visit Date:  2/20/23  Next Visit Date:  Visit date not found    Hemoglobin A1C (%)   Date Value   02/20/2023 5.5   09/16/2019 5.5   12/21/2016 6.1 (H)             ( goal A1C is < 7)   Microalb/Crt.  Ratio (mcg/mg creat)   Date Value   10/26/2011 11     LDL Cholesterol (mg/dL)   Date Value   09/16/2019 45       (goal LDL is <100)   AST (U/L)   Date Value   06/25/2020 17     ALT (U/L)   Date Value   06/25/2020 15     BUN (mg/dL)   Date Value   03/13/2022 15     BP Readings from Last 3 Encounters:   06/05/23 138/70   02/20/23 (!) 161/100   12/30/22 (!) 163/100          (goal 120/80)        Patient Active Problem List:     Major depressive disorder, recurrent episode, severe (HCC)     Bilateral lower abdominal pain     Irritable bowel syndrome with constipation and diarrhea     Uterine leiomyoma     Mirena IUD (1/24/17)     Abnormal uterine bleeding (AUB)     Small bowel obstruction (HCC)     Essential hypertension     Seasonal allergies     Sleep difficulties     Bilateral carpal tunnel syndrome     Bipolar 1 disorder (HCC)     Atypical chest pain     Trigger finger of left thumb     Carpal tunnel syndrome of right wrist      ----JF

## 2023-07-17 ENCOUNTER — OFFICE VISIT (OUTPATIENT)
Dept: ORTHOPEDIC SURGERY | Age: 53
End: 2023-07-17
Payer: COMMERCIAL

## 2023-07-17 VITALS — HEIGHT: 61 IN | BODY MASS INDEX: 38.71 KG/M2 | WEIGHT: 205 LBS

## 2023-07-17 DIAGNOSIS — G56.01 CARPAL TUNNEL SYNDROME OF RIGHT WRIST: Primary | ICD-10-CM

## 2023-07-17 PROCEDURE — 99213 OFFICE O/P EST LOW 20 MIN: CPT | Performed by: ORTHOPAEDIC SURGERY

## 2023-07-17 PROCEDURE — G8417 CALC BMI ABV UP PARAM F/U: HCPCS | Performed by: ORTHOPAEDIC SURGERY

## 2023-07-17 PROCEDURE — 1036F TOBACCO NON-USER: CPT | Performed by: ORTHOPAEDIC SURGERY

## 2023-07-17 PROCEDURE — 3017F COLORECTAL CA SCREEN DOC REV: CPT | Performed by: ORTHOPAEDIC SURGERY

## 2023-07-17 PROCEDURE — G8427 DOCREV CUR MEDS BY ELIG CLIN: HCPCS | Performed by: ORTHOPAEDIC SURGERY

## 2023-07-17 RX ORDER — MECLIZINE HCL 12.5 MG/1
TABLET ORAL
Qty: 30 TABLET | Refills: 2 | Status: SHIPPED | OUTPATIENT
Start: 2023-07-17

## 2023-07-17 NOTE — PROGRESS NOTES
Chief Complaint   Patient presents with    Follow-up     B/L CTS re check   This patient who has previously been diagnosed with bilateral carpal tunnel syndrome is returning here today because of continued pain. She was last seen here in February and at that time she had corticosteroid injection. The patient was told that if she did not respond then she will just call the office and we will schedule her for surgical decompression. She called up the office but did not get satisfactory response and was told that she has to make an appointment and that is why she is here today. Examination: Her Phalen test is strongly positive. Diagnosis: Bilateral carpal tunnel syndrome right worse than the left. Treatment: She will be scheduled to have a carpal tunnel release but will require cardiology clearance. It does not appear that she is on any anticoagulant. The meantime she will continue mobilizing the shoulder which does have excellent motion she will also continue mobilizing the wrist and the fingers.

## 2023-07-19 ENCOUNTER — TELEPHONE (OUTPATIENT)
Dept: ORTHOPEDIC SURGERY | Age: 53
End: 2023-07-19

## 2023-07-19 NOTE — TELEPHONE ENCOUNTER
She is schedule for surgery on 07/28/23 with you. Can she have something for pain sent to the pharmacy, please call patient.

## 2023-07-20 DIAGNOSIS — G56.01 CARPAL TUNNEL SYNDROME OF RIGHT WRIST: ICD-10-CM

## 2023-07-20 RX ORDER — HYDROCODONE BITARTRATE AND ACETAMINOPHEN 5; 325 MG/1; MG/1
1 TABLET ORAL 2 TIMES DAILY
Qty: 14 TABLET | Refills: 0 | Status: SHIPPED | OUTPATIENT
Start: 2023-07-20 | End: 2023-07-27

## 2023-07-25 ENCOUNTER — OFFICE VISIT (OUTPATIENT)
Dept: FAMILY MEDICINE CLINIC | Age: 53
End: 2023-07-25
Payer: COMMERCIAL

## 2023-07-25 VITALS
HEIGHT: 60 IN | HEART RATE: 73 BPM | SYSTOLIC BLOOD PRESSURE: 160 MMHG | DIASTOLIC BLOOD PRESSURE: 94 MMHG | WEIGHT: 202 LBS | BODY MASS INDEX: 39.66 KG/M2

## 2023-07-25 DIAGNOSIS — I10 ESSENTIAL HYPERTENSION: Primary | ICD-10-CM

## 2023-07-25 DIAGNOSIS — G56.03 BILATERAL CARPAL TUNNEL SYNDROME: ICD-10-CM

## 2023-07-25 PROCEDURE — 99213 OFFICE O/P EST LOW 20 MIN: CPT

## 2023-07-25 PROCEDURE — G8427 DOCREV CUR MEDS BY ELIG CLIN: HCPCS

## 2023-07-25 PROCEDURE — 1036F TOBACCO NON-USER: CPT

## 2023-07-25 PROCEDURE — 3078F DIAST BP <80 MM HG: CPT

## 2023-07-25 PROCEDURE — 3074F SYST BP LT 130 MM HG: CPT

## 2023-07-25 PROCEDURE — G8417 CALC BMI ABV UP PARAM F/U: HCPCS

## 2023-07-25 PROCEDURE — 3017F COLORECTAL CA SCREEN DOC REV: CPT

## 2023-07-25 ASSESSMENT — ENCOUNTER SYMPTOMS
COUGH: 0
ABDOMINAL PAIN: 0
WHEEZING: 0
VOMITING: 0
DIARRHEA: 0
CONSTIPATION: 0
SHORTNESS OF BREATH: 0
NAUSEA: 0

## 2023-07-25 NOTE — PROGRESS NOTES
Attending Physician Statement  I have discussed the care of MargwynaJohnsonincluding pertinent history and exam findings,  with the resident. I have reviewed the key elements of all parts of the encounter with the resident. I agree with the assessment, plan and orders as documented by the resident.   (GE Modifier)    Surgery Clearance for Bilat CTS- low risk for surgery  HTN- medication compliance advised

## 2023-07-25 NOTE — PROGRESS NOTES
Rod Rojas (:  1970) is a 50 y.o. female,Established patient, here for evaluation of the following chief complaint(s):  Surgical Clearance (Form in room (Chavo Pt, no openings))         ASSESSMENT/PLAN:  1. Essential hypertension  -Blood pressure is elevated because patient did not take her medications today. Encourage patient to take her medications regularly. 2. Bilateral carpal tunnel syndrome  -Patient is low risk for postop complications. Return in about 4 weeks (around 2023). Subjective   SUBJECTIVE/OBJECTIVE:  50years old female patient with past medical history of hypertension, depression, carpal tunnel syndrome came to the office for preop clearance. Patient is going to have carpal tunnel syndrome surgery on Friday. She wants to be cleared for the surgery. Patient blood pressure is a little bit elevated in the office. Patient that she did not take her medications today. She said that her blood pressure is usually controlled. Today's, she is asymptomatic and has no complaints. Denies any chest pain, shortness of breath, palpitations, weakness, numbness, bowel or urinary habit changes. Review of Systems   Constitutional:  Negative for diaphoresis, fatigue and fever. Respiratory:  Negative for cough, shortness of breath and wheezing. Cardiovascular:  Negative for chest pain, palpitations and leg swelling. Gastrointestinal:  Negative for abdominal pain, constipation, diarrhea, nausea and vomiting. Neurological:  Negative for dizziness, weakness, light-headedness, numbness and headaches. Objective   Physical Exam  Constitutional:       General: She is not in acute distress. Appearance: Normal appearance. HENT:      Head: Normocephalic and atraumatic. Cardiovascular:      Rate and Rhythm: Normal rate and regular rhythm. Pulses: Normal pulses. Heart sounds: Normal heart sounds. No murmur heard.   Pulmonary:      Effort: Pulmonary

## 2023-07-25 NOTE — PROGRESS NOTES
Visit Information    Have you changed or started any medications since your last visit including any over-the-counter medicines, vitamins, or herbal medicines? no   Have you stopped taking any of your medications? Is so, why? -  no  Are you having any side effects from any of your medications? - no    Have you seen any other physician or provider since your last visit? yes - Ortho 7/17/23 and Cardiology 6/8/23  Have you had any other diagnostic tests since your last visit?  no   Have you been seen in the emergency room and/or had an admission in a hospital since we last saw you?  yes - [facility] 6/8/23 for LEft Foot Pain   Have you had your routine dental cleaning in the past 6 months?  no     Do you have an active MyChart account? If no, what is the barrier?   Yes    Patient Care Team:  Dinora Forman MD as PCP - General (Family Medicine)  Dick Huber as Ambulatory Care Manager    Medical History Review  Past Medical, Family, and Social History reviewed and does contribute to the patient presenting condition    Health Maintenance   Topic Date Due    HIV screen  Never done    Hepatitis C screen  Never done    COVID-19 Vaccine (2 - Booster for Jo-Ann series) 07/14/2021    Breast cancer screen  Never done    Shingles vaccine (1 of 2) Never done    Flu vaccine (1) 08/01/2023    Depression Monitoring  02/20/2024    Lipids  09/16/2024    Diabetes screen  02/20/2026    Colorectal Cancer Screen  02/26/2029    DTaP/Tdap/Td vaccine (2 - Td or Tdap) 10/09/2029    Hepatitis A vaccine  Aged Out    Hib vaccine  Aged Out    Meningococcal (ACWY) vaccine  Aged Out    Pneumococcal 0-64 years Vaccine  Aged Out    Cervical cancer screen  Discontinued

## 2023-07-27 ENCOUNTER — TELEPHONE (OUTPATIENT)
Dept: ORTHOPEDIC SURGERY | Age: 53
End: 2023-07-27

## 2023-07-27 NOTE — TELEPHONE ENCOUNTER
Called patient to reschedule surgery from 074/28/23 till 09/22/23 she needs to get cardiology clearance. I have left a few message on machine for patient. I will call cardiology to check appointment date and time, patient was told to schedule with them as well.

## 2023-07-27 NOTE — TELEPHONE ENCOUNTER
Patient is aware surgery was cancelled and rescheduled, she will call cardiology to schedule the CTA then call office back to reschedule surgery.

## 2023-07-31 ENCOUNTER — TELEPHONE (OUTPATIENT)
Dept: ORTHOPEDIC SURGERY | Age: 53
End: 2023-07-31

## 2023-07-31 NOTE — TELEPHONE ENCOUNTER
Pt called in requesting a refill on pain medicine HYDROcodone-acetaminophen (NORCO) 5-325 MG per tablet   for her hand, states is currently out and is asking if she could have something that is stronger as the current medicine is not help her.       Please send to     [address], [place] - [facility]  [phone]       Please call pt with questions @ [phone]

## 2023-07-31 NOTE — TELEPHONE ENCOUNTER
Diagnosis: Bilateral carpal tunnel syndrome right worse than the left. Original surgery cx d/t cardiac clearance not being done. Was previously given norco. She says it does not work, asking for something stronger. Did you want to try gabapentin?

## 2023-08-01 ENCOUNTER — OFFICE VISIT (OUTPATIENT)
Dept: ORTHOPEDIC SURGERY | Age: 53
End: 2023-08-01
Payer: COMMERCIAL

## 2023-08-01 VITALS — HEIGHT: 61 IN | BODY MASS INDEX: 37.95 KG/M2 | WEIGHT: 201 LBS

## 2023-08-01 DIAGNOSIS — G56.01 CARPAL TUNNEL SYNDROME OF RIGHT WRIST: Primary | ICD-10-CM

## 2023-08-01 PROCEDURE — G8417 CALC BMI ABV UP PARAM F/U: HCPCS | Performed by: ORTHOPAEDIC SURGERY

## 2023-08-01 PROCEDURE — 99211 OFF/OP EST MAY X REQ PHY/QHP: CPT | Performed by: ORTHOPAEDIC SURGERY

## 2023-08-01 PROCEDURE — G8427 DOCREV CUR MEDS BY ELIG CLIN: HCPCS | Performed by: ORTHOPAEDIC SURGERY

## 2023-08-01 NOTE — PROGRESS NOTES
This 74-year-old patient who has been diagnosed with bilateral carpal tunnel syndrome is returning here because she says the pain is getting worse. She is now not able to sleep at night. She was waiting for cardiac clearance but apparently this cannot be a long delay because she has not been scheduled for this. She does not want to wait. Examination: There is no thenar wasting. The sensation is altered in the median nerve. Her Phalen test is strongly positive. Diagnosis: Bilateral carpal tunnel syndrome. Treatment: I did offer her 3 options of injecting it today or waiting for all the testing is done and then schedule her under general anesthesia or proceed with decompression under local anesthesia. She would like to proceed as soon as possible under local.  We will try and see if he can accommodate her this week.

## 2023-08-04 ENCOUNTER — HOSPITAL ENCOUNTER (OUTPATIENT)
Age: 53
Setting detail: OUTPATIENT SURGERY
Discharge: HOME | End: 2023-08-04
Attending: ORTHOPAEDIC SURGERY | Admitting: ORTHOPAEDIC SURGERY
Payer: COMMERCIAL

## 2023-08-04 VITALS
HEART RATE: 66 BPM | HEIGHT: 61 IN | RESPIRATION RATE: 16 BRPM | WEIGHT: 201 LBS | DIASTOLIC BLOOD PRESSURE: 85 MMHG | BODY MASS INDEX: 37.95 KG/M2 | OXYGEN SATURATION: 100 % | TEMPERATURE: 97 F | SYSTOLIC BLOOD PRESSURE: 135 MMHG

## 2023-08-04 DIAGNOSIS — G89.18 POST-OP PAIN: Primary | ICD-10-CM

## 2023-08-04 PROCEDURE — 7100000041 HC SPAR PHASE II RECOVERY - ADDTL 15 MIN: Performed by: ORTHOPAEDIC SURGERY

## 2023-08-04 PROCEDURE — 64721 CARPAL TUNNEL SURGERY: CPT | Performed by: ORTHOPAEDIC SURGERY

## 2023-08-04 PROCEDURE — 6360000002 HC RX W HCPCS: Performed by: ORTHOPAEDIC SURGERY

## 2023-08-04 PROCEDURE — 6370000000 HC RX 637 (ALT 250 FOR IP)

## 2023-08-04 PROCEDURE — 7100000040 HC SPAR PHASE II RECOVERY - FIRST 15 MIN: Performed by: ORTHOPAEDIC SURGERY

## 2023-08-04 PROCEDURE — 3600000004 HC SURGERY LEVEL 4 BASE: Performed by: ORTHOPAEDIC SURGERY

## 2023-08-04 PROCEDURE — 2709999900 HC NON-CHARGEABLE SUPPLY: Performed by: ORTHOPAEDIC SURGERY

## 2023-08-04 PROCEDURE — 2500000003 HC RX 250 WO HCPCS: Performed by: ORTHOPAEDIC SURGERY

## 2023-08-04 PROCEDURE — 20526 THER INJECTION CARP TUNNEL: CPT | Performed by: ORTHOPAEDIC SURGERY

## 2023-08-04 PROCEDURE — 3600000014 HC SURGERY LEVEL 4 ADDTL 15MIN: Performed by: ORTHOPAEDIC SURGERY

## 2023-08-04 PROCEDURE — 2580000003 HC RX 258: Performed by: ORTHOPAEDIC SURGERY

## 2023-08-04 RX ORDER — OXYCODONE HYDROCHLORIDE AND ACETAMINOPHEN 5; 325 MG/1; MG/1
1 TABLET ORAL EVERY 6 HOURS PRN
Qty: 28 TABLET | Refills: 0 | Status: SHIPPED | OUTPATIENT
Start: 2023-08-04 | End: 2023-08-11

## 2023-08-04 RX ORDER — METHYLPREDNISOLONE ACETATE 40 MG/ML
INJECTION, SUSPENSION INTRA-ARTICULAR; INTRALESIONAL; INTRAMUSCULAR; SOFT TISSUE PRN
Status: DISCONTINUED | OUTPATIENT
Start: 2023-08-04 | End: 2023-08-04 | Stop reason: ALTCHOICE

## 2023-08-04 RX ORDER — AMLODIPINE BESYLATE 5 MG/1
5 TABLET ORAL DAILY
Status: DISCONTINUED | OUTPATIENT
Start: 2023-08-04 | End: 2023-08-04 | Stop reason: HOSPADM

## 2023-08-04 RX ORDER — LIDOCAINE HYDROCHLORIDE 10 MG/ML
INJECTION, SOLUTION EPIDURAL; INFILTRATION; INTRACAUDAL; PERINEURAL PRN
Status: DISCONTINUED | OUTPATIENT
Start: 2023-08-04 | End: 2023-08-04 | Stop reason: ALTCHOICE

## 2023-08-04 RX ORDER — HYDROCHLOROTHIAZIDE 25 MG/1
25 TABLET ORAL DAILY
Status: DISCONTINUED | OUTPATIENT
Start: 2023-08-04 | End: 2023-08-04 | Stop reason: HOSPADM

## 2023-08-04 RX ORDER — MAGNESIUM HYDROXIDE 1200 MG/15ML
LIQUID ORAL CONTINUOUS PRN
Status: COMPLETED | OUTPATIENT
Start: 2023-08-04 | End: 2023-08-04

## 2023-08-04 RX ORDER — DOCUSATE SODIUM 100 MG/1
100 CAPSULE, LIQUID FILLED ORAL 2 TIMES DAILY PRN
Qty: 20 CAPSULE | Refills: 0 | Status: SHIPPED | OUTPATIENT
Start: 2023-08-04

## 2023-08-04 RX ORDER — LOSARTAN POTASSIUM 50 MG/1
50 TABLET ORAL DAILY
Status: DISCONTINUED | OUTPATIENT
Start: 2023-08-04 | End: 2023-08-04 | Stop reason: HOSPADM

## 2023-08-04 RX ORDER — BUPIVACAINE HYDROCHLORIDE AND EPINEPHRINE 5; 5 MG/ML; UG/ML
INJECTION, SOLUTION EPIDURAL; INTRACAUDAL; PERINEURAL PRN
Status: DISCONTINUED | OUTPATIENT
Start: 2023-08-04 | End: 2023-08-04 | Stop reason: ALTCHOICE

## 2023-08-04 RX ADMIN — METOPROLOL TARTRATE 25 MG: 25 TABLET, FILM COATED ORAL at 11:41

## 2023-08-04 RX ADMIN — LOSARTAN POTASSIUM 25 MG: 50 TABLET, FILM COATED ORAL at 11:41

## 2023-08-04 ASSESSMENT — PAIN DESCRIPTION - FREQUENCY
FREQUENCY: CONTINUOUS
FREQUENCY: CONTINUOUS

## 2023-08-04 ASSESSMENT — PAIN DESCRIPTION - LOCATION
LOCATION: HAND
LOCATION: HAND

## 2023-08-04 ASSESSMENT — PAIN - FUNCTIONAL ASSESSMENT: PAIN_FUNCTIONAL_ASSESSMENT: 0-10

## 2023-08-04 ASSESSMENT — PAIN DESCRIPTION - DESCRIPTORS
DESCRIPTORS: NUMBNESS
DESCRIPTORS: SHARP
DESCRIPTORS: NUMBNESS

## 2023-08-04 ASSESSMENT — PAIN DESCRIPTION - ORIENTATION
ORIENTATION: RIGHT
ORIENTATION: RIGHT

## 2023-08-04 ASSESSMENT — PAIN SCALES - GENERAL
PAINLEVEL_OUTOF10: 4
PAINLEVEL_OUTOF10: 3

## 2023-08-04 NOTE — DISCHARGE INSTRUCTIONS
Orthopaedic Instructions:  -Weight bearing status: Weight bearing as tolerated with the right arm  -Starting three days after surgery, okay for daily dressing changes until wound/surgical incision site is dry. Dressing changes can be performed with simple Band-aids or gauze pads secured with tape/ace bandages. Once you no longer see drainage from your wounds on your dressings, it is okay to shower. Do not scrub vigorously, just let water run over wound/surgical sites. Additionally, at this point one no longer needs to change dressings daily. It is important that you do not soak the wound/incision site underwater, though. This includes baths, hot tubs, swimming pools, etc. Do not apply lotions or creams over the incision sites.  -Always work on finger , wrist motion to decrease swelling.  -Ice (20 minutes on and off 1 hour) and elevate to reduce swelling and throbbing pain.  -Should urinate within 8 hours of surgery.  -Call the office or come to Emergency Room if signs of infection appear (hot, swollen, red, draining pus, fever)  -Take medications as prescribed.  -Wean off narcotics (percocet/norco) as soon as possible. Do not take tylenol if still taking narcotics.  -Follow up with Dr. Janny Walls in his office 14 days after surgery. Call [phone]  to schedule/confirm or with any questions/concerns.

## 2023-08-04 NOTE — OP NOTE
Operative Note      Patient: Shivani Del Angel  YOB: 1970  MRN: [id number]    Date of Procedure: 8/4/2023    Pre-Op Diagnosis Codes:     * Right carpal tunnel syndrome [G56.01]        Left carpal tunnel syndrome    Post-Op Diagnosis: Same       Procedure(s):  Left Carpal Tunnel Injection  Right Carpal Tunnel Release      Surgeon(s):  Aurea Ordoñez MD     Assistant:  DO Victorina Alvarez MD Skip Arrant, DO     Tourniquet time: 5 mins     Anesthesia: Local     Estimated Blood Loss (mL): 5 cc       Complications: None    Specimens:   * No specimens in log *    Implants:  * No implants in log *      Drains: * No LDAs found *    Findings: hypertrophied transverse carpal ligament. Detailed Description of Procedure:     Operative Note      INDICATIONS:   This is a 50 y.o. female who presents with severe EMG confirmed median nerve   compression of the right median nerve with decreased sensation and some muscle wasting. Her Left upper extremity has symptoms consistent with carpal tunnel syndrome. The patient now presents for decompression of the right median nerve at the wrist with an external neurolysis in addition to left carpal tunnel corticosteroid injection. The risks and benefits of the procedure were explained in great lenght, including, but not limited to the risk of infection, bleeding, scar formation, delayed wound healing, recurrence,   failure to resolve the symptoms and the need for reoperation. The above was understood and the patient wished to proceed. PROCEDURE:   The patient presented to the operating room, was laid in the supine position for local anesthesia. First the left upper extremity was laid on a armboard and the volar aspect of the wrist was sterilized with iodine. A one-to-one ratio of 1% lidocaine plain and Depo 40 was drawn out for a total of 2 cc and certainly injected into the carpal tunnel. We then turned our attention to the right upper extremity.     The right arm was prepped and draped in sterile fashion. 6 cc of 1% lidocaine plain was then injected just proximal to the incisional site and then advanced to the planned surgical site. Once adequate local anesthesia was applied, a tourniquet was inflated to 250 mmHg. An incision was made with a #15 blade in the palm and carried down under direct vision through the palmar fascia to the transverse carpal ligament. The patient experienced significant amounts of pain due to the tourniquet so the tourniquet was dropped for total tourniquet time of 5 minutes. An additional 4 cc of 1% lidocaine plain was injected in the surrounding tissues and 2 cc of 1% lidocaine plain was copiously irrigated through the wound. We then proceeded with the transverse carpal ligament, which was incised and the underlying median nerve encountered, it was freed from the undersurface of the ligament and the ligament was released proximally and distally in its entirety with a pair of tenotomy scissors. It was noted the transverse carpal ligament with significantly hypertrophied. The median nerve was kept out of harms way and in direct view at all times. The wound was copiously irrigated and an external neurolysis was then performed due to the nerve having a flattened and hour glass appearance and being encased in scar tissue. The wound was irrigated again. The incision then closed with 4-0 nylon suture in a simple interrupted fashion. A bulky dressing was applied. Direct pressure was applied for 2 minutes. Ace bandage was then applied. The patient was taken to postop recovery in stable condition. All instruments and sponge counts were correct following the procedure. In the immediate postoperative period patient will be nonweightbearing to her right upper extremity. Weightbearing as tolerated to her left upper extremity.   We will have her keep her bandages on for at least 3 days in the postoperative period and then may progress to

## 2023-08-04 NOTE — H&P
History and Physical    Pt Name: Jane Hernandez  MRN: [id number]  YOB: 1970  Date of evaluation: 8/4/2023  Primary Care Physician: Raza Cadet MD    SUBJECTIVE:   History of Chief Complaint:    Jane Hernandez is a 50 y.o. female who is scheduled today for RELEASE OF MEDIAN NERVE  (3080 TABLE, HAND TABLE, SUPINE) - Right. Patient reports history of sharp pain to her right and left hands/wrists for the last couple of years, worsening in recent months. She states she also has numbness and tingling to her bilateral hands and wrists. Overall, she states her symptoms are worse in her right hand than left. She states she uses bilateral braces to her hands but this no longer helps. Patient is right hand dominant. Allergies  is allergic to motrin [ibuprofen]. Medications  Prior to Admission medications    Medication Sig Start Date End Date Taking?  Authorizing Provider   meclizine (ANTIVERT) 12.5 MG tablet TAKE 1 TABLET BY MOUTH 3 TIMES DAILY AS NEEDED FOR DIZZINESS OR FOR NAUSEA 7/17/23   Raza Cadet MD   colestipol (COLESTID) 1 g tablet TAKE 1 TABLET BY MOUTH IN THE MORNING 7/9/23   Raza Cadet MD   amLODIPine (NORVASC) 5 MG tablet Take 1 tablet by mouth daily 7/9/23   Raza Cadet MD   hydroCHLOROthiazide (HYDRODIURIL) 25 MG tablet TAKE 1 TABLET BY MOUTH DAILY 6/19/23   Raza Cadet MD   cloNIDine (CATAPRES) 0.3 MG tablet TAKE ONE (1) TABLET BY MOUTH TWICE DAILY 6/19/23   Raza Cadet MD   folic acid (FOLVITE) 1 MG tablet TAKE 1 TABLET BY MOUTH DAILY 6/19/23   Raza Cadet MD   metoprolol tartrate (LOPRESSOR) 25 MG tablet Take 1 tablet by mouth 2 times daily For Coronary CTA 6/5/23   Fatoumata Lazo MD   diclofenac sodium (VOLTAREN) 1 % GEL APPLY 2 GRAMS TOPICALLY FOUR TIMES A DAY 5/18/23   Raza Cadet MD   sertraline (ZOLOFT) 100 MG tablet TAKE ONE (1) TABLET BY MOUTH ONCE DAILY 5/8/23   Valarie Dupont MD   polyethylene glycol (GLYCOLAX) 17 GM/SCOOP powder MIX 17 GM (1 CAPFUL) IN 8 OUNCES WATER OR JUICE AND DRINK BY MOUTH ONCE DAILY 4/26/23   Nghia Glynn MD   loratadine (CLARITIN) 10 MG tablet TAKE 1 TABLET BY MOUTH NIGHTLY AS NEEDED FOR SEASONAL ALLERGIES 4/19/23   Wesley Metzger MD   albuterol sulfate HFA (VENTOLIN HFA) 108 (90 Base) MCG/ACT inhaler INHALE TWO (2) PUFFS BY MOUTH FOUR TIMES A DAY AS NEEDED FOR WHEEZING 3/29/23   Italia Cooney MD   omeprazole (PRILOSEC) 20 MG delayed release capsule TAKE 1 CAPSULE BY MOUTH DAILY WITH BREAKFAST 3/7/23   Nghia Glynn MD   losartan (COZAAR) 25 MG tablet Take 2 tablets by mouth daily 2/20/23   Thania Poe MD   famotidine (PEPCID) 20 MG tablet Take 1 tablet by mouth 2 times daily 12/30/22   Marii Rasmussen MD   ACETAMINOPHEN EXTRA STRENGTH 500 MG tablet TAKE 1 TABLET BY MOUTH FOUR TIMES DAILY AS NEEDED FOR PAIN 12/21/22   Roslyn Moreno MD   mineral oil liquid TAKE 30ML BY MOUTH DAILY AS NEEDED FOR CONSTIPATION 11/15/22   MariumYulisa Schultz MD   gabapentin (NEURONTIN) 100 MG capsule TAKE (3) CAPSULES BY MOUTH THREE TIMES A DAY, MAY CAUSE SEDATION. 6/13/22 8/4/23  Galen Hankins MD   prazosin (MINIPRESS) 2 MG capsule Take 1 capsule by mouth daily as needed (PANIC) 3/14/22 8/4/23  Cecile Strange MD   melatonin (RA MELATONIN) 3 MG TABS tablet Take 1 tablet by mouth nightly as needed (for sleep) 3/14/22   Cecile Strange MD   Lidocaine, Anorectal, (HEMORRHOIDAL RELIEF) 5 % CREA Apply 1 Applicatorful topically daily 3/10/21   Jhonathan Jack MD   Incontinence Supply Disposable ([employer]S HEALTH INCONTINENCE PADS) MISC 1 each by Does not apply route daily as needed (for incontinence as needed) 11/3/20   Galen Hankins MD   ondansetron (ZOFRAN-ODT) 4 MG disintegrating tablet Take 1 tablet by mouth 3 times daily as needed for Nausea or Vomiting  Patient not taking: Reported on 7/27/2023 9/17/20   Galen Hankins MD   Sodium Phosphates (FLEET) 7-19 GM/118ML Place 1 enema rectally daily as needed (constipation).  3/16/15   Toni Trejo, DO     Past Medical History    has

## 2023-08-04 NOTE — BRIEF OP NOTE
Brief Postoperative Note      Patient: Suhbham Verma  YOB: 1970  MRN: [id number]    Date of Procedure: 8/4/2023    Pre-Op Diagnosis Codes:     * Right carpal tunnel syndrome [G56.01]        Left Carpal tunnel syndrome    Post-Op Diagnosis: Same       Procedure(s):  Left Carpal Tunnel Injection  Right Carpal Tunnel Release  Surgeon(s):  Chico Workman MD    Assistant:  DO Demarco Rubio MD Edra Sake, DO    Tourniquet time: 5 mins    Anesthesia: Local    Estimated Blood Loss (mL): 5 cc    Complications: None    Specimens:   * No specimens in log *    Implants:  * No implants in log *      Drains: * No LDAs found *    Findings: Hypertrophied transverse carpal ligament. Electronically signed by Demarco Garza MD on 8/4/2023 at 11:15 AM    I was physically present during the entire  procedure.

## 2023-08-04 NOTE — PERIOP NOTE
Per Dr. Sandra Wilson to administer medications as scanned into [facility] based on patient current blood pressure. All medication adjusts were approved by Dr. Shivani Mckenzie at bedside.

## 2023-08-11 DIAGNOSIS — F33.2 SEVERE EPISODE OF RECURRENT MAJOR DEPRESSIVE DISORDER, WITHOUT PSYCHOTIC FEATURES (HCC): ICD-10-CM

## 2023-08-11 DIAGNOSIS — I10 ESSENTIAL HYPERTENSION: ICD-10-CM

## 2023-08-11 RX ORDER — FAMOTIDINE 20 MG/1
20 TABLET, FILM COATED ORAL 2 TIMES DAILY
Qty: 60 TABLET | Refills: 3 | Status: SHIPPED | OUTPATIENT
Start: 2023-08-11

## 2023-08-11 RX ORDER — AMLODIPINE BESYLATE 5 MG/1
5 TABLET ORAL DAILY
Qty: 30 TABLET | Refills: 3 | Status: SHIPPED | OUTPATIENT
Start: 2023-08-11

## 2023-08-11 RX ORDER — SERTRALINE HYDROCHLORIDE 100 MG/1
TABLET, FILM COATED ORAL
Qty: 30 TABLET | Refills: 0 | Status: SHIPPED | OUTPATIENT
Start: 2023-08-11

## 2023-08-11 NOTE — TELEPHONE ENCOUNTER
E-scribe request for pepcid. Please review and e-scribe if applicable.      Last Visit Date:  7/25/2023  Next Visit Date:  8/11/2023    Hemoglobin A1C (%)   Date Value   02/20/2023 5.5   09/16/2019 5.5   12/21/2016 6.1 (H)             ( goal A1C is < 7)   No components found for: LABMICR  LDL Cholesterol (mg/dL)   Date Value   09/16/2019 45       (goal LDL is <100)   AST (U/L)   Date Value   06/25/2020 17     ALT (U/L)   Date Value   06/25/2020 15     BUN (mg/dL)   Date Value   03/13/2022 15     BP Readings from Last 3 Encounters:   08/04/23 135/85   07/25/23 (!) 160/94   06/05/23 138/70          (goal 120/80)        Patient Active Problem List:     Major depressive disorder, recurrent episode, severe (HCC)     Bilateral lower abdominal pain     Irritable bowel syndrome with constipation and diarrhea     Uterine leiomyoma     Mirena IUD (1/24/17)     Abnormal uterine bleeding (AUB)     Small bowel obstruction (HCC)     Essential hypertension     Seasonal allergies     Sleep difficulties     Bilateral carpal tunnel syndrome     Bipolar 1 disorder (HCC)     Atypical chest pain     Trigger finger of left thumb     Carpal tunnel syndrome of right wrist      ----JF

## 2023-08-11 NOTE — TELEPHONE ENCOUNTER
E-scribe request for zoloft. Please review and e-scribe if applicable.      Last Visit Date:  7/25/2023  Next Visit Date:  Visit date not found    Hemoglobin A1C (%)   Date Value   02/20/2023 5.5   09/16/2019 5.5   12/21/2016 6.1 (H)             ( goal A1C is < 7)   No components found for: LABMICR  LDL Cholesterol (mg/dL)   Date Value   09/16/2019 45       (goal LDL is <100)   AST (U/L)   Date Value   06/25/2020 17     ALT (U/L)   Date Value   06/25/2020 15     BUN (mg/dL)   Date Value   03/13/2022 15     BP Readings from Last 3 Encounters:   08/04/23 135/85   07/25/23 (!) 160/94   06/05/23 138/70          (goal 120/80)        Patient Active Problem List:     Major depressive disorder, recurrent episode, severe (HCC)     Bilateral lower abdominal pain     Irritable bowel syndrome with constipation and diarrhea     Uterine leiomyoma     Mirena IUD (1/24/17)     Abnormal uterine bleeding (AUB)     Small bowel obstruction (HCC)     Essential hypertension     Seasonal allergies     Sleep difficulties     Bilateral carpal tunnel syndrome     Bipolar 1 disorder (HCC)     Atypical chest pain     Trigger finger of left thumb     Carpal tunnel syndrome of right wrist      ----JF

## 2023-08-11 NOTE — TELEPHONE ENCOUNTER
E-scribe request for norvasc. Please review and e-scribe if applicable.      Last Visit Date:  7/25/2023  Next Visit Date:  8/11/2023    Hemoglobin A1C (%)   Date Value   02/20/2023 5.5   09/16/2019 5.5   12/21/2016 6.1 (H)             ( goal A1C is < 7)   No components found for: LABMICR  LDL Cholesterol (mg/dL)   Date Value   09/16/2019 45       (goal LDL is <100)   AST (U/L)   Date Value   06/25/2020 17     ALT (U/L)   Date Value   06/25/2020 15     BUN (mg/dL)   Date Value   03/13/2022 15     BP Readings from Last 3 Encounters:   08/04/23 135/85   07/25/23 (!) 160/94   06/05/23 138/70          (goal 120/80)        Patient Active Problem List:     Major depressive disorder, recurrent episode, severe (HCC)     Bilateral lower abdominal pain     Irritable bowel syndrome with constipation and diarrhea     Uterine leiomyoma     Mirena IUD (1/24/17)     Abnormal uterine bleeding (AUB)     Small bowel obstruction (HCC)     Essential hypertension     Seasonal allergies     Sleep difficulties     Bilateral carpal tunnel syndrome     Bipolar 1 disorder (HCC)     Atypical chest pain     Trigger finger of left thumb     Carpal tunnel syndrome of right wrist      ----JF

## 2023-08-15 ENCOUNTER — OFFICE VISIT (OUTPATIENT)
Dept: ORTHOPEDIC SURGERY | Age: 53
End: 2023-08-15

## 2023-08-15 VITALS — BODY MASS INDEX: 37.95 KG/M2 | WEIGHT: 201 LBS | HEIGHT: 61 IN

## 2023-08-15 DIAGNOSIS — G56.03 BILATERAL CARPAL TUNNEL SYNDROME: Primary | ICD-10-CM

## 2023-08-15 PROCEDURE — 99024 POSTOP FOLLOW-UP VISIT: CPT | Performed by: ORTHOPAEDIC SURGERY

## 2023-08-15 NOTE — PROGRESS NOTES
Chief Complaint   Patient presents with    Post-Op Check     DOS-8/4/23 R CTR  L CTS pt requesting injection

## 2023-08-15 NOTE — PROGRESS NOTES
This patient had undergone open release of her right carpal tunnel syndrome and injection of the left side on 8/4/2023 is seen here in follow-up. Patient is quite happy with the result of the surgery. She does not wake up in the night with severe pain anymore. She still has some residual numbness in the tip of the finger but he says that it is getting better. The left side is just doing fine. Examination: The incision is healed. She has excellent motion of the fingers. Diagnosis: Status post right carpal tunnel release and injection left carpal tunnel    Treatment: The sutures have been removed Steri-Strips applied advised to let the Steri-Strips fall off by themselves . see as needed

## 2023-09-15 DIAGNOSIS — K58.2 IRRITABLE BOWEL SYNDROME WITH CONSTIPATION AND DIARRHEA: ICD-10-CM

## 2023-09-15 DIAGNOSIS — R42 DIZZINESS: ICD-10-CM

## 2023-09-15 DIAGNOSIS — I10 ESSENTIAL HYPERTENSION: ICD-10-CM

## 2023-09-18 NOTE — TELEPHONE ENCOUNTER
Essential hypertension     Seasonal allergies     Sleep difficulties     Bilateral carpal tunnel syndrome     Bipolar 1 disorder (HCC)     Atypical chest pain     Trigger finger of left thumb     Carpal tunnel syndrome of right wrist     Please review and address medication refill , if i can be an assistance be route to acceptable pool. Thank you.

## 2023-09-19 RX ORDER — FOLIC ACID 1 MG/1
TABLET ORAL
Qty: 30 TABLET | Refills: 2 | Status: SHIPPED | OUTPATIENT
Start: 2023-09-19

## 2023-09-19 RX ORDER — HYDROCHLOROTHIAZIDE 25 MG/1
TABLET ORAL
Qty: 30 TABLET | Refills: 2 | Status: SHIPPED | OUTPATIENT
Start: 2023-09-19

## 2023-09-19 RX ORDER — MECLIZINE HCL 12.5 MG/1
TABLET ORAL
Qty: 30 TABLET | Refills: 2 | Status: SHIPPED | OUTPATIENT
Start: 2023-09-19

## 2023-09-19 RX ORDER — CLONIDINE HYDROCHLORIDE 0.3 MG/1
TABLET ORAL
Qty: 60 TABLET | Refills: 2 | Status: SHIPPED | OUTPATIENT
Start: 2023-09-19

## 2023-09-19 RX ORDER — MONTELUKAST SODIUM 4 MG/1
1 TABLET, CHEWABLE ORAL EVERY MORNING
Qty: 30 TABLET | Refills: 2 | Status: SHIPPED | OUTPATIENT
Start: 2023-09-19

## 2023-10-30 DIAGNOSIS — I10 ESSENTIAL HYPERTENSION: ICD-10-CM

## 2023-10-31 RX ORDER — AMLODIPINE BESYLATE 5 MG/1
5 TABLET ORAL DAILY
Qty: 30 TABLET | Refills: 10 | Status: SHIPPED | OUTPATIENT
Start: 2023-10-31

## 2023-10-31 NOTE — TELEPHONE ENCOUNTER
E-scribe request for amlodipine. Please review and e-scribe if applicable.      Last Visit Date:  7/25/2023  Next Visit Date:  Visit date not found    Hemoglobin A1C (%)   Date Value   02/20/2023 5.5   09/16/2019 5.5   12/21/2016 6.1 (H)             ( goal A1C is < 7)   No components found for: \"LABMICR\"  LDL Cholesterol (mg/dL)   Date Value   09/16/2019 45       (goal LDL is <100)   AST (U/L)   Date Value   06/25/2020 17     ALT (U/L)   Date Value   06/25/2020 15     BUN (mg/dL)   Date Value   03/13/2022 15     BP Readings from Last 3 Encounters:   08/04/23 135/85   07/25/23 (!) 160/94   06/05/23 138/70          (goal 120/80)        Patient Active Problem List:     Major depressive disorder, recurrent episode, severe (HCC)     Bilateral lower abdominal pain     Irritable bowel syndrome with constipation and diarrhea     Uterine leiomyoma     Mirena IUD (1/24/17)     Abnormal uterine bleeding (AUB)     Small bowel obstruction (HCC)     Essential hypertension     Seasonal allergies     Sleep difficulties     Bilateral carpal tunnel syndrome     Bipolar 1 disorder (HCC)     Atypical chest pain     Trigger finger of left thumb     Carpal tunnel syndrome of right wrist      ----JF

## 2023-11-13 RX ORDER — FAMOTIDINE 20 MG/1
20 TABLET, FILM COATED ORAL 2 TIMES DAILY
Qty: 60 TABLET | Refills: 3 | Status: SHIPPED | OUTPATIENT
Start: 2023-11-13

## 2023-11-13 NOTE — TELEPHONE ENCOUNTER
E-scribe request for PEPCID. Please review and e-scribe if applicable.      Last Visit Date:  7/25/23  Next Visit Date:  Visit date not found    Hemoglobin A1C (%)   Date Value   02/20/2023 5.5   09/16/2019 5.5   12/21/2016 6.1 (H)             ( goal A1C is < 7)   No components found for: \"LABMICR\"  LDL Cholesterol (mg/dL)   Date Value   09/16/2019 45       (goal LDL is <100)   AST (U/L)   Date Value   06/25/2020 17     ALT (U/L)   Date Value   06/25/2020 15     BUN (mg/dL)   Date Value   03/13/2022 15     BP Readings from Last 3 Encounters:   08/04/23 135/85   07/25/23 (!) 160/94   06/05/23 138/70          (goal 120/80)        Patient Active Problem List:     Major depressive disorder, recurrent episode, severe (HCC)     Bilateral lower abdominal pain     Irritable bowel syndrome with constipation and diarrhea     Uterine leiomyoma     Mirena IUD (1/24/17)     Abnormal uterine bleeding (AUB)     Small bowel obstruction (HCC)     Essential hypertension     Seasonal allergies     Sleep difficulties     Bilateral carpal tunnel syndrome     Bipolar 1 disorder (HCC)     Atypical chest pain     Trigger finger of left thumb     Carpal tunnel syndrome of right wrist      ----JF

## 2023-11-14 DIAGNOSIS — Z76.0 MEDICATION REFILL: ICD-10-CM

## 2023-11-15 RX ORDER — PSEUDOEPHED/ACETAMINOPH/DIPHEN 30MG-500MG
TABLET ORAL
Qty: 120 TABLET | Refills: 10 | Status: SHIPPED | OUTPATIENT
Start: 2023-11-15

## 2023-11-15 RX ORDER — ALBUTEROL SULFATE 90 UG/1
AEROSOL, METERED RESPIRATORY (INHALATION)
Qty: 18 G | Refills: 10 | Status: SHIPPED | OUTPATIENT
Start: 2023-11-15

## 2023-11-15 NOTE — TELEPHONE ENCOUNTER
Last visit: 07/25/2023  Last Med refill: 03/29/2023  Does patient have enough medication for 72 hours: No:     Next Visit Date:  No future appointments.     Health Maintenance   Topic Date Due    Hepatitis B vaccine (1 of 3 - 3-dose series) Never done    HIV screen  Never done    Hepatitis C screen  Never done    Breast cancer screen  11/13/2022    Shingles vaccine (1 of 2) Never done    Low dose CT lung screening &/or counseling  Never done    Flu vaccine (1) 08/01/2023    COVID-19 Vaccine (2 - 2023-24 season) 09/01/2023    Depression Monitoring  02/20/2024    Lipids  09/16/2024    Diabetes screen  02/20/2026    Colorectal Cancer Screen  02/26/2029    DTaP/Tdap/Td vaccine (2 - Td or Tdap) 10/09/2029    Hepatitis A vaccine  Aged Out    Hib vaccine  Aged Out    Meningococcal (ACWY) vaccine  Aged Out    Pneumococcal 0-64 years Vaccine  Aged Out    Cervical cancer screen  Discontinued       Hemoglobin A1C (%)   Date Value   02/20/2023 5.5   09/16/2019 5.5   12/21/2016 6.1 (H)             ( goal A1C is < 7)   No components found for: \"LABMICR\"  LDL Cholesterol (mg/dL)   Date Value   09/16/2019 45   05/17/2014 93       (goal LDL is <100)   AST (U/L)   Date Value   06/25/2020 17     ALT (U/L)   Date Value   06/25/2020 15     BUN (mg/dL)   Date Value   03/13/2022 15     BP Readings from Last 3 Encounters:   08/04/23 135/85   07/25/23 (!) 160/94   06/05/23 138/70          (goal 120/80)    All Future Testing planned in [facility]  Lab Frequency Next Occurrence   HIV Screen Once 02/20/2023   Hepatitis C Antibody Once 02/20/2023   Lipid Panel Once 06/05/2023   AST Once 06/05/2023   ALT Once 06/05/2023               Patient Active Problem List:     Major depressive disorder, recurrent episode, severe (HCC)     Bilateral lower abdominal pain     Irritable bowel syndrome with constipation and diarrhea     Uterine leiomyoma     Mirena IUD (1/24/17)     Abnormal uterine bleeding (AUB)     Small bowel obstruction ([address])     Essential

## 2023-11-15 NOTE — TELEPHONE ENCOUNTER
hypertension     Seasonal allergies     Sleep difficulties     Bilateral carpal tunnel syndrome     Bipolar 1 disorder (HCC)     Atypical chest pain     Trigger finger of left thumb     Carpal tunnel syndrome of right wrist

## 2023-12-14 DIAGNOSIS — I10 ESSENTIAL HYPERTENSION: ICD-10-CM

## 2023-12-15 RX ORDER — LOSARTAN POTASSIUM 25 MG/1
25 TABLET ORAL DAILY
Qty: 30 TABLET | Refills: 10 | Status: SHIPPED | OUTPATIENT
Start: 2023-12-15

## 2023-12-15 NOTE — TELEPHONE ENCOUNTER
Please address the medication refill and close the encounter. If I can be of assistance, please route to the applicable pool. Thank you. Last visit: 7-25-23  Last Med refill: 2-20-23  Does patient have enough medication for 72 hours: No:     Next Visit Date:  No future appointments.     Health Maintenance   Topic Date Due    Hepatitis B vaccine (1 of 3 - 3-dose series) Never done    HIV screen  Never done    Hepatitis C screen  Never done    Breast cancer screen  11/13/2022    Shingles vaccine (1 of 2) Never done    Low dose CT lung screening &/or counseling  Never done    Flu vaccine (1) 08/01/2023    COVID-19 Vaccine (2 - 2023-24 season) 09/01/2023    Depression Monitoring  02/20/2024    Lipids  09/16/2024    Diabetes screen  02/20/2026    Colorectal Cancer Screen  02/26/2029    DTaP/Tdap/Td vaccine (2 - Td or Tdap) 10/09/2029    Hepatitis A vaccine  Aged Out    Hib vaccine  Aged Out    Meningococcal (ACWY) vaccine  Aged Out    Pneumococcal 0-64 years Vaccine  Aged Out    Cervical cancer screen  Discontinued       Hemoglobin A1C (%)   Date Value   02/20/2023 5.5   09/16/2019 5.5   12/21/2016 6.1 (H)             ( goal A1C is < 7)   No components found for: \"LABMICR\"  LDL Cholesterol (mg/dL)   Date Value   09/16/2019 45   05/17/2014 93       (goal LDL is <100)   AST (U/L)   Date Value   06/25/2020 17     ALT (U/L)   Date Value   06/25/2020 15     BUN (mg/dL)   Date Value   03/13/2022 15     BP Readings from Last 3 Encounters:   08/04/23 135/85   07/25/23 (!) 160/94   06/05/23 138/70          (goal 120/80)    All Future Testing planned in [facility]  Lab Frequency Next Occurrence   HIV Screen Once 02/20/2023   Hepatitis C Antibody Once 02/20/2023   Lipid Panel Once 06/05/2023   AST Once 06/05/2023   ALT Once 06/05/2023               Patient Active Problem List:     Major depressive disorder, recurrent episode, severe (HCC)     Bilateral lower abdominal pain     Irritable bowel syndrome with constipation and

## 2024-01-15 DIAGNOSIS — I10 ESSENTIAL HYPERTENSION: ICD-10-CM

## 2024-01-15 DIAGNOSIS — K58.2 IRRITABLE BOWEL SYNDROME WITH CONSTIPATION AND DIARRHEA: ICD-10-CM

## 2024-01-15 DIAGNOSIS — R42 DIZZINESS: ICD-10-CM

## 2024-01-17 RX ORDER — HYDROCHLOROTHIAZIDE 25 MG/1
TABLET ORAL
Qty: 30 TABLET | Refills: 0 | Status: SHIPPED | OUTPATIENT
Start: 2024-01-17

## 2024-01-17 RX ORDER — FOLIC ACID 1 MG/1
TABLET ORAL
Qty: 30 TABLET | Refills: 0 | Status: SHIPPED | OUTPATIENT
Start: 2024-01-17

## 2024-01-17 RX ORDER — MECLIZINE HCL 12.5 MG/1
TABLET ORAL
Qty: 30 TABLET | Refills: 10 | Status: SHIPPED | OUTPATIENT
Start: 2024-01-17

## 2024-01-17 RX ORDER — MONTELUKAST SODIUM 4 MG/1
1 TABLET, CHEWABLE ORAL EVERY MORNING
Qty: 30 TABLET | Refills: 0 | Status: SHIPPED | OUTPATIENT
Start: 2024-01-17

## 2024-01-17 NOTE — TELEPHONE ENCOUNTER
Writer called patient to schedule a follow up, writer had to LVM for patient to call us back.   
(HCC)     Essential hypertension     Seasonal allergies     Sleep difficulties     Bilateral carpal tunnel syndrome     Bipolar 1 disorder (HCC)     Atypical chest pain     Trigger finger of left thumb     Carpal tunnel syndrome of right wrist

## 2024-01-22 ENCOUNTER — OFFICE VISIT (OUTPATIENT)
Dept: ORTHOPEDIC SURGERY | Age: 54
End: 2024-01-22
Payer: COMMERCIAL

## 2024-01-22 VITALS — HEIGHT: 61 IN | BODY MASS INDEX: 38.33 KG/M2 | WEIGHT: 203 LBS

## 2024-01-22 DIAGNOSIS — M47.812 SPONDYLOSIS OF CERVICAL REGION WITHOUT MYELOPATHY OR RADICULOPATHY: Primary | ICD-10-CM

## 2024-01-22 DIAGNOSIS — M75.01 ADHESIVE CAPSULITIS OF RIGHT SHOULDER: ICD-10-CM

## 2024-01-22 PROCEDURE — G8427 DOCREV CUR MEDS BY ELIG CLIN: HCPCS | Performed by: ORTHOPAEDIC SURGERY

## 2024-01-22 PROCEDURE — G8417 CALC BMI ABV UP PARAM F/U: HCPCS | Performed by: ORTHOPAEDIC SURGERY

## 2024-01-22 PROCEDURE — 1036F TOBACCO NON-USER: CPT | Performed by: ORTHOPAEDIC SURGERY

## 2024-01-22 PROCEDURE — G8484 FLU IMMUNIZE NO ADMIN: HCPCS | Performed by: ORTHOPAEDIC SURGERY

## 2024-01-22 PROCEDURE — 99214 OFFICE O/P EST MOD 30 MIN: CPT | Performed by: ORTHOPAEDIC SURGERY

## 2024-01-22 PROCEDURE — 3017F COLORECTAL CA SCREEN DOC REV: CPT | Performed by: ORTHOPAEDIC SURGERY

## 2024-01-22 NOTE — PROGRESS NOTES
This patient is seen here for 2 problems.  She still continues to complain of pain in the left hand and numbness and tingling.  The other problem she has is that of pain in the right shoulder which has been going on for the last few weeks.  There is no history of injury there.    As for the hand she has had corticosteroid injection before with good result.  It does wake her up at night.  She uses a hot potato and that gives her some relief.  Shaking the hand does not help her very much.    As for the right shoulder the pain is felt in the shoulder and on the scapula and also at the base of the neck on the right side.  There is no numbness or tingling.  The pain is constant and aggravated by activities of the shoulder.    Examination: Cervical spine examination shows there is extension lateral flexion and rotation to the left reproduce the pain in the shoulder.  Other motions were full and pain-free.  Right shoulder examination shows she has excellent flexion and abduction but she does have slight pain at the extremes of motion.  Internal rotation is also painful.  But it is not limited.    Exam of the left hand shows definite hypoesthesia over the lateral  3-1/2 digits.  There is no wasting.  Wrist motions are excellent.  Phalen test is positive.    X-rays: Cervical spine x-rays show there is straightening of the lordosis.  At C5-6 there is definite narrowing at the facet joint.  The disc space also may be slightly narrowed.    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  #2 recurrent left carpal tunnel syndrome.    Treatment: Patient did undergo right carpal tunnel decompression surgically and has had a good response.    Discussed with her that she has had according to her about 4 or 5 injection and therefore we should really proceed with surgical decompression and she is agreeable.  She assured me that she did have EMG nerve conduction studies which had confirmed bilateral

## 2024-01-29 ENCOUNTER — HOSPITAL ENCOUNTER (OUTPATIENT)
Dept: PHYSICAL THERAPY | Facility: CLINIC | Age: 54
Setting detail: THERAPIES SERIES
Discharge: HOME | End: 2024-01-29
Payer: COMMERCIAL

## 2024-01-29 PROCEDURE — 97110 THERAPEUTIC EXERCISES: CPT

## 2024-01-29 PROCEDURE — 97161 PT EVAL LOW COMPLEX 20 MIN: CPT
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
## 2024-02-05 ENCOUNTER — HOSPITAL ENCOUNTER (OUTPATIENT)
Dept: PHYSICAL THERAPY | Facility: CLINIC | Age: 54
Setting detail: THERAPIES SERIES
Discharge: HOME | End: 2024-02-05
Payer: COMMERCIAL

## 2024-02-05 PROCEDURE — 97110 THERAPEUTIC EXERCISES: CPT

## 2024-02-05 NOTE — FLOWSHEET NOTE
score to <40% impaired to demo improved functional mobility   Pt to improve L shoulder flexion and abduction AROM to at least 150° with minimal pain (3/10 max) to ease difficulty with dressing/grooming tasks  Pt to improve L shoulder strength to at least 4/5 with reduced pain (3/10 max) to demo improved tolerance with household activities  Pt to improve cervical AROM to at least 50° ext and 30% limited L rot to demo improved tissue extensibility                      Patient goals: reduced pain    Pt. Education:  [] Yes  [] No  [x] Reviewed Prior HEP/Ed  Method of Education: [] Verbal  [] Demo  [] Written    Access Code: [email or web address]  URL: [email or web address]  Date: 01/29/2024  Prepared by: Cindy English     Exercises  - Seated Upper Trapezius Stretch  - 3 x daily - 7 x weekly - 1 sets - 3 reps - 30sec hold  - Seated Levator Scapulae Stretch  - 3 x daily - 7 x weekly - 1 sets - 3 reps - 30sec hold  - Doorway Pec Stretch at 60 Elevation  - 1 x daily - 7 x weekly - 1 sets - 3 reps - 30sec hold  - Seated Cervical Retraction  - 1 x daily - 7 x weekly - 2 sets - 10 reps - 5sec hold  - Seated Scapular Retraction  - 1 x daily - 7 x weekly - 2 sets - 10 reps - 5sec hold       Comprehension of Education:  [] Verbalizes understanding.  [] Demonstrates understanding.  [] Needs review.  [x] Demonstrates/verbalizes HEP/Ed previously given.     Plan: [x] Continue current frequency toward long and short term goals.    [x] Specific Instructions for subsequent treatments: see above      Time In: 9:03 am            Time Out: 9:54 am     Electronically signed by:  Cindy English PT

## 2024-02-07 ENCOUNTER — HOSPITAL ENCOUNTER (OUTPATIENT)
Dept: PHYSICAL THERAPY | Facility: CLINIC | Age: 54
Setting detail: THERAPIES SERIES
Discharge: HOME | End: 2024-02-07
Payer: COMMERCIAL

## 2024-02-07 NOTE — FLOWSHEET NOTE
[] [facility]  Outpatient Rehabilitation &  Therapy  [address]  [phone]  [phone] [x] [facility]  Outpatient Rehabilitation &  Therapy  [address]  P: [phone]  F: [phone] [] [facility]  Outpatient Rehabilitation &  Therapy  [address]  P: [phone]  F: [phone] [] [facility]  Outpatient Rehabilitation &  Therapy  [address]  [phone]  [phone] [] [facility]  Outpatient Rehabilitation &  Therapy  [address]   P: [phone]  F: [phone]  [] [facility]  Outpatient Rehabilitation &  Therapy  [address]  P: [phone]  F: [phone] [] [facility]  Outpatient Rehabilitation &  Therapy  [address]  P: [phone]  F: [phone] [] [facility]  Outpatient Rehabilitation &  Therapy  [address]  P: [phone]  F: [phone] [] [facility]  Outpatient Rehabilitation &  Therapy  [address]  P: [phone]  F: [phone]  [] [facility] Outpatient Rehabilitation &  Therapy  [address]  P: [phone]  F: [phone]     Therapy Cancel/No Show note    Date: 2024  Patient: Marleeanne Galaviz  : 1970  MRN: [id number]    Cancels/No Shows to date: 0    For today's appointment patient:    [x]  Cancelled    [] Rescheduled appointment    [] No-show     Reason given by patient:    []  Patient ill    []  Conflicting appointment    [] No transportation      [] Conflict with work    [] No reason given    [] Weather related    [] COVID-19    [x] Other:      Comments: Cab did not arrive.       [x] Next appointment was confirmed    Electronically signed by: Cindy English PT

## 2024-02-12 ENCOUNTER — HOSPITAL ENCOUNTER (OUTPATIENT)
Dept: PHYSICAL THERAPY | Facility: CLINIC | Age: 54
Setting detail: THERAPIES SERIES
Discharge: HOME | End: 2024-02-12
Payer: COMMERCIAL

## 2024-02-12 PROCEDURE — 97140 MANUAL THERAPY 1/> REGIONS: CPT

## 2024-02-12 PROCEDURE — 97110 THERAPEUTIC EXERCISES: CPT

## 2024-02-12 NOTE — FLOWSHEET NOTE
Patient reports that his brother kicked his thumb last night around 1900 while they were wrestling.  This morning the thumb is slightly swollen, painful, and ecchymotic.   medication, posture, and diaphragmatic breathing to progress towards goals. Introduced hypervolt to further reduce muscular tension with good response. Continued resisted strengthening with addition of B ER. Will monitor sx's and progress as able.     [] No change.     [] Other:    [x] Patient would continue to benefit from skilled physical therapy services in order to: manage pain, improve R shoulder/scapular and cervical ROM and strength, and promote spinal postural muscular endurance to promote proper posture and assist pt in improving overall quality of life.         Problems:    [x] ? Pain:4-6-9/10 neck and B shoulder R>L  [x] ? ROM: R shoulder, cervical all directions d/t decreased tissue extensibility, B pec majors/minors   [x] ? Strength: R shoulder, deep neck flexors, spinal postural ms  [x] ? Function: NDI 64% impaired  [x] Other: impaired posture           Goals:  STG: (to be met in 6 treatments)  Pt will decrease pain to less than or equal to 5/10 with ADLs  Pt will improve L shoulder flexion and abduction AROM to at least 120° with reduced pain (5/10 max) to ease difficulty with overhead activities  Pt will improve L shoulder strength to at least 4/5 with reduced pain (5/10 max) to improve tolerance with household tasks   Pt to improve cervical AROM to at least 40° ext, 45° R SB, and 35% limited L rot to demo improved tissue extensibility    Pt to demo improved postural awareness as evident by ability to maintain proper upright posture for at least 20 mins without cues needed  Patient to be independent with home exercise program as demonstrated by performance with correct form without cues.  Pt to improve NDI score to <50% impaired to demo improved functional mobility      LTG: (to be met in 10 treatments)  Pt to improve NDI score to <40% impaired to demo improved functional mobility   Pt to improve L shoulder flexion and abduction AROM to at least 150° with minimal pain (3/10 max) to ease difficulty

## 2024-02-14 ENCOUNTER — HOSPITAL ENCOUNTER (OUTPATIENT)
Dept: PHYSICAL THERAPY | Facility: CLINIC | Age: 54
Setting detail: THERAPIES SERIES
Discharge: HOME | End: 2024-02-14
Payer: COMMERCIAL

## 2024-02-14 DIAGNOSIS — I10 ESSENTIAL HYPERTENSION: ICD-10-CM

## 2024-02-14 DIAGNOSIS — K58.2 IRRITABLE BOWEL SYNDROME WITH CONSTIPATION AND DIARRHEA: ICD-10-CM

## 2024-02-14 NOTE — FLOWSHEET NOTE
Supine ceiling punches  2x10 2# wand     Supine HAB/HADD 10x 2# wand     Supine circles cw/ccw 10xea           Standing:      Doorway pec stretch 2x30s   60-90 deg          Shoulder row  20x  Lime     Shoulder ext  20x Lime     Horizontal abd + scap retraction 2x10  Duchesne     B ER 20x Lime           Manual  10 mins   2/12- hypervolt to B UT's/levator/surrounding scapula    Supine - MFR/STM to B upper trap -not today   Other:      Specific Instructions for next treatment:  - Improve cervical and thoracic ROM/tissue extensibility  - P-AA-AROM R shoulder pain-free to pain-tolerable range  - Strengthening R shoulder and scapulothoracic stabilizers globally   - Review diaphragmatic breathing  - Manual to cervical musculatures and R shoulder as needed  - CP/HP/vaso prn          Treatment Charges: Mins Units   []  Modalities     []  Ther Exercise 0 0   []  Manual Therapy     []  Ther Activities     []  Neuro Re-ed     []  Vasocompression     [] Gait     []  Other     Total Billable time 0 0     UBE warm up - not billed 2/14      Assessment: [] Progressing toward goals    [] No change.     [x] Other: Only completed UBE warm up this date.  See bolded text in exercise chart above for detail.   [x] Patient would continue to benefit from skilled physical therapy services in order to: manage pain, improve R shoulder/scapular and cervical ROM and strength, and promote spinal postural muscular endurance to promote proper posture and assist pt in improving overall quality of life.         Problems:    [x] ? Pain:4-6-9/10 neck and B shoulder R>L  [x] ? ROM: R shoulder, cervical all directions d/t decreased tissue extensibility, B pec majors/minors   [x] ? Strength: R shoulder, deep neck flexors, spinal postural ms  [x] ? Function: NDI 64% impaired  [x] Other: impaired posture           Goals:  STG: (to be met in 6 treatments)  Pt will decrease pain to less than or equal to 5/10 with ADLs  Pt will improve L shoulder flexion and

## 2024-02-16 RX ORDER — HYDROCHLOROTHIAZIDE 25 MG/1
TABLET ORAL
Qty: 30 TABLET | Refills: 1 | Status: SHIPPED | OUTPATIENT
Start: 2024-02-16

## 2024-02-16 RX ORDER — MONTELUKAST SODIUM 4 MG/1
1 TABLET, CHEWABLE ORAL EVERY MORNING
Qty: 30 TABLET | Refills: 0 | Status: SHIPPED | OUTPATIENT
Start: 2024-02-16

## 2024-02-16 RX ORDER — FOLIC ACID 1 MG/1
TABLET ORAL
Qty: 30 TABLET | Refills: 0 | Status: SHIPPED | OUTPATIENT
Start: 2024-02-16

## 2024-02-19 ENCOUNTER — APPOINTMENT (OUTPATIENT)
Dept: PHYSICAL THERAPY | Facility: CLINIC | Age: 54
End: 2024-02-19
Payer: COMMERCIAL

## 2024-02-21 ENCOUNTER — APPOINTMENT (OUTPATIENT)
Dept: PHYSICAL THERAPY | Facility: CLINIC | Age: 54
End: 2024-02-21
Payer: COMMERCIAL

## 2024-03-27 DIAGNOSIS — Z76.0 MEDICATION REFILL: ICD-10-CM

## 2024-03-28 RX ORDER — LORATADINE 10 MG/1
TABLET ORAL
Qty: 30 TABLET | Refills: 0 | Status: SHIPPED | OUTPATIENT
Start: 2024-03-28

## 2024-03-28 NOTE — TELEPHONE ENCOUNTER
Last visit: 7/25/23  Last Med refill: 4/19/2023  Does patient have enough medication for 72 hours: no    Next Visit Date:  No future appointments.    Health Maintenance   Topic Date Due    Hepatitis B vaccine (1 of 3 - 3-dose series) Never done    HIV screen  Never done    Hepatitis C screen  Never done    Breast cancer screen  11/13/2022    Shingles vaccine (1 of 2) Never done    Low dose CT lung screening &/or counseling  Never done    Flu vaccine (1) 08/01/2023    COVID-19 Vaccine (2 - 2023-24 season) 09/01/2023    Depression Monitoring  02/20/2024    Lipids  09/16/2024    Diabetes screen  02/20/2026    Colorectal Cancer Screen  02/26/2029    DTaP/Tdap/Td vaccine (2 - Td or Tdap) 10/09/2029    Hepatitis A vaccine  Aged Out    Hib vaccine  Aged Out    Polio vaccine  Aged Out    Meningococcal (ACWY) vaccine  Aged Out    Pneumococcal 0-64 years Vaccine  Aged Out    Cervical cancer screen  Discontinued       Hemoglobin A1C (%)   Date Value   02/20/2023 5.5   09/16/2019 5.5   12/21/2016 6.1 (H)             ( goal A1C is < 7)   No components found for: \"LABMICR\"  LDL Cholesterol (mg/dL)   Date Value   09/16/2019 45   05/17/2014 93       (goal LDL is <100)   AST (U/L)   Date Value   06/25/2020 17     ALT (U/L)   Date Value   06/25/2020 15     BUN (mg/dL)   Date Value   03/13/2022 15     BP Readings from Last 3 Encounters:   08/04/23 135/85   07/25/23 (!) 160/94   06/05/23 138/70          (goal 120/80)    All Future Testing planned in [facility]  Lab Frequency Next Occurrence   Lipid Panel Once 06/05/2023   AST Once 06/05/2023   ALT Once 06/05/2023               Patient Active Problem List:     Major depressive disorder, recurrent episode, severe (HCC)     Bilateral lower abdominal pain     Irritable bowel syndrome with constipation and diarrhea     Uterine leiomyoma     Mirena IUD (1/24/17)     Abnormal uterine bleeding (AUB)     Small bowel obstruction (HCC)     Essential hypertension     Seasonal allergies     Sleep

## 2024-04-04 DIAGNOSIS — I10 ESSENTIAL HYPERTENSION: ICD-10-CM

## 2024-04-04 NOTE — TELEPHONE ENCOUNTER
Last visit: 7/2023  Last Med refill:   Does patient have enough medication for 72 hours: No: Patient made appt for 4-29-24    Next Visit Date:  Future Appointments   Date Time Provider Department Center   4/19/2024  8:45 AM Kuldeep Londono MD [id number]       Health Maintenance   Topic Date Due    Hepatitis B vaccine (1 of 3 - 3-dose series) Never done    HIV screen  Never done    Hepatitis C screen  Never done    Breast cancer screen  11/13/2022    Shingles vaccine (1 of 2) Never done    Low dose CT lung screening &/or counseling  Never done    COVID-19 Vaccine (2 - 2023-24 season) 09/01/2023    Depression Monitoring  02/20/2024    Flu vaccine (Season Ended) 08/01/2024    Lipids  09/16/2024    Diabetes screen  02/20/2026    Colorectal Cancer Screen  02/26/2029    DTaP/Tdap/Td vaccine (2 - Td or Tdap) 10/09/2029    Hepatitis A vaccine  Aged Out    Hib vaccine  Aged Out    Polio vaccine  Aged Out    Meningococcal (ACWY) vaccine  Aged Out    Pneumococcal 0-64 years Vaccine  Aged Out    Cervical cancer screen  Discontinued       Hemoglobin A1C (%)   Date Value   02/20/2023 5.5   09/16/2019 5.5   12/21/2016 6.1 (H)             ( goal A1C is < 7)   No components found for: \"LABMICR\"  LDL Cholesterol (mg/dL)   Date Value   09/16/2019 45   05/17/2014 93       (goal LDL is <100)   AST (U/L)   Date Value   06/25/2020 17     ALT (U/L)   Date Value   06/25/2020 15     BUN (mg/dL)   Date Value   03/13/2022 15     BP Readings from Last 3 Encounters:   08/04/23 135/85   07/25/23 (!) 160/94   06/05/23 138/70          (goal 120/80)    All Future Testing planned in [facility]  Lab Frequency Next Occurrence   Lipid Panel Once 06/05/2023   AST Once 06/05/2023   ALT Once 06/05/2023               Patient Active Problem List:     Major depressive disorder, recurrent episode, severe (HCC)     Bilateral lower abdominal pain     Irritable bowel syndrome with constipation and diarrhea     Uterine leiomyoma     Mirena IUD (1/24/17)

## 2024-04-06 RX ORDER — AMLODIPINE BESYLATE 5 MG/1
5 TABLET ORAL DAILY
Qty: 30 TABLET | Refills: 1 | Status: SHIPPED | OUTPATIENT
Start: 2024-04-06

## 2024-04-09 DIAGNOSIS — I10 ESSENTIAL HYPERTENSION: ICD-10-CM

## 2024-04-09 NOTE — TELEPHONE ENCOUNTER
E-scribe request for norvasc pending for refill. Please review and e-scribe if applicable.     Last Visit Date:  7/25/23  Next Visit Date:  4/19/2024    Hemoglobin A1C (%)   Date Value   02/20/2023 5.5   09/16/2019 5.5   12/21/2016 6.1 (H)             ( goal A1C is < 7)   No components found for: \"LABMICR\"  LDL Cholesterol (mg/dL)   Date Value   09/16/2019 45       (goal LDL is <100)   AST (U/L)   Date Value   06/25/2020 17     ALT (U/L)   Date Value   06/25/2020 15     BUN (mg/dL)   Date Value   03/13/2022 15     BP Readings from Last 3 Encounters:   08/04/23 135/85   07/25/23 (!) 160/94   06/05/23 138/70          (goal 120/80)        Patient Active Problem List:     Major depressive disorder, recurrent episode, severe (HCC)     Bilateral lower abdominal pain     Irritable bowel syndrome with constipation and diarrhea     Uterine leiomyoma     Mirena IUD (1/24/17)     Abnormal uterine bleeding (AUB)     Small bowel obstruction (HCC)     Essential hypertension     Seasonal allergies     Sleep difficulties     Bilateral carpal tunnel syndrome     Bipolar 1 disorder (HCC)     Atypical chest pain     Trigger finger of left thumb     Carpal tunnel syndrome of right wrist

## 2024-04-10 RX ORDER — AMLODIPINE BESYLATE 5 MG/1
5 TABLET ORAL DAILY
Qty: 30 TABLET | Refills: 3 | OUTPATIENT
Start: 2024-04-10

## 2024-04-19 ENCOUNTER — OFFICE VISIT (OUTPATIENT)
Dept: FAMILY MEDICINE CLINIC | Age: 54
End: 2024-04-19
Payer: COMMERCIAL

## 2024-04-19 VITALS
DIASTOLIC BLOOD PRESSURE: 103 MMHG | BODY MASS INDEX: 38.38 KG/M2 | HEART RATE: 81 BPM | SYSTOLIC BLOOD PRESSURE: 142 MMHG | WEIGHT: 203 LBS

## 2024-04-19 DIAGNOSIS — Z12.31 ENCOUNTER FOR SCREENING MAMMOGRAM FOR MALIGNANT NEOPLASM OF BREAST: ICD-10-CM

## 2024-04-19 DIAGNOSIS — F33.2 SEVERE EPISODE OF RECURRENT MAJOR DEPRESSIVE DISORDER, WITHOUT PSYCHOTIC FEATURES (HCC): ICD-10-CM

## 2024-04-19 DIAGNOSIS — Z76.0 MEDICATION REFILL: ICD-10-CM

## 2024-04-19 DIAGNOSIS — K58.2 IRRITABLE BOWEL SYNDROME WITH CONSTIPATION AND DIARRHEA: ICD-10-CM

## 2024-04-19 DIAGNOSIS — Z11.59 NEED FOR HEPATITIS C SCREENING TEST: ICD-10-CM

## 2024-04-19 DIAGNOSIS — I10 ESSENTIAL HYPERTENSION: Primary | ICD-10-CM

## 2024-04-19 PROCEDURE — 99213 OFFICE O/P EST LOW 20 MIN: CPT

## 2024-04-19 PROCEDURE — 3080F DIAST BP >= 90 MM HG: CPT

## 2024-04-19 PROCEDURE — 3077F SYST BP >= 140 MM HG: CPT

## 2024-04-19 SDOH — ECONOMIC STABILITY: INCOME INSECURITY: HOW HARD IS IT FOR YOU TO PAY FOR THE VERY BASICS LIKE FOOD, HOUSING, MEDICAL CARE, AND HEATING?: NOT VERY HARD

## 2024-04-19 SDOH — ECONOMIC STABILITY: FOOD INSECURITY: WITHIN THE PAST 12 MONTHS, YOU WORRIED THAT YOUR FOOD WOULD RUN OUT BEFORE YOU GOT MONEY TO BUY MORE.: NEVER TRUE

## 2024-04-19 SDOH — ECONOMIC STABILITY: FOOD INSECURITY: WITHIN THE PAST 12 MONTHS, THE FOOD YOU BOUGHT JUST DIDN'T LAST AND YOU DIDN'T HAVE MONEY TO GET MORE.: NEVER TRUE

## 2024-04-19 ASSESSMENT — PATIENT HEALTH QUESTIONNAIRE - PHQ9
7. TROUBLE CONCENTRATING ON THINGS, SUCH AS READING THE NEWSPAPER OR WATCHING TELEVISION: NEARLY EVERY DAY
SUM OF ALL RESPONSES TO PHQ QUESTIONS 1-9: 17
SUM OF ALL RESPONSES TO PHQ QUESTIONS 1-9: 17
9. THOUGHTS THAT YOU WOULD BE BETTER OFF DEAD, OR OF HURTING YOURSELF: NOT AT ALL
3. TROUBLE FALLING OR STAYING ASLEEP: SEVERAL DAYS
1. LITTLE INTEREST OR PLEASURE IN DOING THINGS: SEVERAL DAYS
2. FEELING DOWN, DEPRESSED OR HOPELESS: NEARLY EVERY DAY
SUM OF ALL RESPONSES TO PHQ9 QUESTIONS 1 & 2: 4
SUM OF ALL RESPONSES TO PHQ QUESTIONS 1-9: 17
8. MOVING OR SPEAKING SO SLOWLY THAT OTHER PEOPLE COULD HAVE NOTICED. OR THE OPPOSITE, BEING SO FIGETY OR RESTLESS THAT YOU HAVE BEEN MOVING AROUND A LOT MORE THAN USUAL: NOT AT ALL
5. POOR APPETITE OR OVEREATING: NEARLY EVERY DAY
SUM OF ALL RESPONSES TO PHQ QUESTIONS 1-9: 17
6. FEELING BAD ABOUT YOURSELF - OR THAT YOU ARE A FAILURE OR HAVE LET YOURSELF OR YOUR FAMILY DOWN: NEARLY EVERY DAY
4. FEELING TIRED OR HAVING LITTLE ENERGY: NEARLY EVERY DAY
10. IF YOU CHECKED OFF ANY PROBLEMS, HOW DIFFICULT HAVE THESE PROBLEMS MADE IT FOR YOU TO DO YOUR WORK, TAKE CARE OF THINGS AT HOME, OR GET ALONG WITH OTHER PEOPLE: EXTREMELY DIFFICULT

## 2024-04-19 NOTE — PROGRESS NOTES
Attending Physician Statement  I  have discussed the care of Marnie Galaviz including pertinent history and exam findings with the resident. I agree with the assessment, plan and orders as documented by the resident.      BP (!) 142/103   Pulse 81   Wt 92.1 kg (203 lb)   LMP 11/11/2016   BMI 38.38 kg/m²    BP Readings from Last 3 Encounters:   04/19/24 (!) 142/103   08/04/23 135/85   07/25/23 (!) 160/94     Wt Readings from Last 3 Encounters:   04/19/24 92.1 kg (203 lb)   01/22/24 92.1 kg (203 lb)   08/15/23 91.2 kg (201 lb)          Diagnosis Orders   1. Essential hypertension  CBC with Auto Differential    Basic Metabolic Panel    Lipid Panel    DME Order for (Specify) as OP      2. Encounter for screening mammogram for malignant neoplasm of breast  JEAN DIGITAL SCREEN W OR WO CAD BILATERAL      3. Need for hepatitis C screening test  Hepatitis C Antibody      4. Severe episode of recurrent major depressive disorder, without psychotic features (HCC)                Carmela Ortiz DO 4/22/2024 8:49 AM      
        Review of Systems   Constitutional:  Negative for chills, diaphoresis, fatigue and fever.   Respiratory:  Negative for cough, shortness of breath and wheezing.    Cardiovascular:  Negative for chest pain, palpitations and leg swelling.   Psychiatric/Behavioral:  Positive for dysphoric mood. Negative for agitation. The patient is nervous/anxious.           Objective   Physical Exam  Constitutional:       General: She is not in acute distress.     Appearance: Normal appearance.   HENT:      Head: Normocephalic and atraumatic.   Cardiovascular:      Rate and Rhythm: Normal rate and regular rhythm.      Pulses: Normal pulses.      Heart sounds: Normal heart sounds. No murmur heard.  Pulmonary:      Effort: Pulmonary effort is normal.      Breath sounds: Normal breath sounds. No wheezing, rhonchi or rales.   Abdominal:      General: Abdomen is flat. Bowel sounds are normal.      Palpations: Abdomen is soft.   Musculoskeletal:      Right lower leg: No edema.      Left lower leg: No edema.   Neurological:      General: No focal deficit present.      Mental Status: She is alert and oriented to person, place, and time.            On this date 4/19/2024 I have spent 25 minutes reviewing previous notes, test results and face to face with the patient discussing the diagnosis and importance of compliance with the treatment plan as well as documenting on the day of the visit.      An electronic signature was used to authenticate this note.    --Kuldeep Londono MD

## 2024-04-22 NOTE — TELEPHONE ENCOUNTER
constipation and diarrhea     Uterine leiomyoma     Mirena IUD (1/24/17)     Abnormal uterine bleeding (AUB)     Small bowel obstruction (HCC)     Essential hypertension     Seasonal allergies     Sleep difficulties     Bilateral carpal tunnel syndrome     Bipolar 1 disorder (HCC)     Atypical chest pain     Trigger finger of left thumb     Carpal tunnel syndrome of right wrist

## 2024-04-22 NOTE — TELEPHONE ENCOUNTER
syndrome with constipation and diarrhea     Uterine leiomyoma     Mirena IUD (1/24/17)     Abnormal uterine bleeding (AUB)     Small bowel obstruction (HCC)     Essential hypertension     Seasonal allergies     Sleep difficulties     Bilateral carpal tunnel syndrome     Bipolar 1 disorder (HCC)     Atypical chest pain     Trigger finger of left thumb     Carpal tunnel syndrome of right wrist

## 2024-04-23 DIAGNOSIS — F33.2 SEVERE EPISODE OF RECURRENT MAJOR DEPRESSIVE DISORDER, WITHOUT PSYCHOTIC FEATURES (HCC): ICD-10-CM

## 2024-04-23 RX ORDER — FOLIC ACID 1 MG/1
TABLET ORAL
Qty: 30 TABLET | Refills: 10 | Status: SHIPPED | OUTPATIENT
Start: 2024-04-23

## 2024-04-23 RX ORDER — LORATADINE 10 MG/1
TABLET ORAL
Qty: 30 TABLET | Refills: 10 | Status: SHIPPED | OUTPATIENT
Start: 2024-04-23

## 2024-04-23 RX ORDER — SERTRALINE HYDROCHLORIDE 100 MG/1
100 TABLET, FILM COATED ORAL DAILY
Qty: 30 TABLET | Refills: 10 | Status: SHIPPED | OUTPATIENT
Start: 2024-04-23

## 2024-04-23 RX ORDER — SERTRALINE HYDROCHLORIDE 100 MG/1
100 TABLET, FILM COATED ORAL DAILY
Qty: 30 TABLET | Refills: 10 | OUTPATIENT
Start: 2024-04-23

## 2024-04-23 RX ORDER — MONTELUKAST SODIUM 4 MG/1
1 TABLET, CHEWABLE ORAL EVERY MORNING
Qty: 30 TABLET | Refills: 10 | Status: SHIPPED | OUTPATIENT
Start: 2024-04-23

## 2024-04-23 NOTE — TELEPHONE ENCOUNTER
E-scribe request for zoloft. Please review and e-scribe if applicable.     Last Visit Date:  4/19/24  Next Visit Date:  Visit date not found    Hemoglobin A1C (%)   Date Value   02/20/2023 5.5   09/16/2019 5.5   12/21/2016 6.1 (H)             ( goal A1C is < 7)   No components found for: \"LABMICR\"  LDL Cholesterol (mg/dL)   Date Value   09/16/2019 45       (goal LDL is <100)   AST (U/L)   Date Value   06/25/2020 17     ALT (U/L)   Date Value   06/25/2020 15     BUN (mg/dL)   Date Value   03/13/2022 15     BP Readings from Last 3 Encounters:   04/19/24 (!) 142/103   08/04/23 135/85   07/25/23 (!) 160/94          (goal 120/80)        Patient Active Problem List:     Major depressive disorder, recurrent episode, severe (HCC)     Bilateral lower abdominal pain     Irritable bowel syndrome with constipation and diarrhea     Uterine leiomyoma     Mirena IUD (1/24/17)     Abnormal uterine bleeding (AUB)     Small bowel obstruction (HCC)     Essential hypertension     Seasonal allergies     Sleep difficulties     Bilateral carpal tunnel syndrome     Bipolar 1 disorder (HCC)     Atypical chest pain     Trigger finger of left thumb     Carpal tunnel syndrome of right wrist

## 2024-05-08 DIAGNOSIS — F33.2 SEVERE EPISODE OF RECURRENT MAJOR DEPRESSIVE DISORDER, WITHOUT PSYCHOTIC FEATURES (HCC): ICD-10-CM

## 2024-05-08 RX ORDER — SERTRALINE HYDROCHLORIDE 100 MG/1
200 TABLET, FILM COATED ORAL DAILY
Qty: 60 TABLET | Refills: 1 | Status: SHIPPED | OUTPATIENT
Start: 2024-05-08 | End: 2024-07-07

## 2024-05-13 DIAGNOSIS — I10 ESSENTIAL HYPERTENSION: ICD-10-CM

## 2024-05-14 RX ORDER — HYDROCHLOROTHIAZIDE 25 MG/1
TABLET ORAL
Qty: 30 TABLET | Refills: 3 | Status: SHIPPED | OUTPATIENT
Start: 2024-05-14

## 2024-05-14 RX ORDER — DICLOFENAC SODIUM 1 %
GEL (GRAM) TOPICAL
Qty: 100 G | Refills: 2 | Status: SHIPPED | OUTPATIENT
Start: 2024-05-14

## 2024-05-14 NOTE — TELEPHONE ENCOUNTER
E-scribe request for Voltaren Gel. Please review and e-scribe if applicable.     Last Visit Date:  4/19/24  Next Visit Date:  Visit date not found    Hemoglobin A1C (%)   Date Value   02/20/2023 5.5   09/16/2019 5.5   12/21/2016 6.1 (H)             ( goal A1C is < 7)   No components found for: \"LABMICR\"  No components found for: \"LDLCHOLESTEROL\", \"LDLCALC\"    (goal LDL is <100)   AST (U/L)   Date Value   06/25/2020 17     ALT (U/L)   Date Value   06/25/2020 15     BUN (mg/dL)   Date Value   03/13/2022 15     BP Readings from Last 3 Encounters:   04/19/24 (!) 142/103   08/04/23 135/85   07/25/23 (!) 160/94          (goal 120/80)        Patient Active Problem List:     Major depressive disorder, recurrent episode, severe (HCC)     Bilateral lower abdominal pain     Irritable bowel syndrome with constipation and diarrhea     Uterine leiomyoma     Mirena IUD (1/24/17)     Abnormal uterine bleeding (AUB)     Small bowel obstruction (HCC)     Essential hypertension     Seasonal allergies     Sleep difficulties     Bilateral carpal tunnel syndrome     Bipolar 1 disorder (HCC)     Atypical chest pain     Trigger finger of left thumb     Carpal tunnel syndrome of right wrist

## 2024-05-14 NOTE — TELEPHONE ENCOUNTER
E-scribe request for Hydrochlorothiazide . Please review and e-scribe if applicable.     Last Visit Date:  4/19/24  Next Visit Date:  5/13/2024    Hemoglobin A1C (%)   Date Value   02/20/2023 5.5   09/16/2019 5.5   12/21/2016 6.1 (H)             ( goal A1C is < 7)   No components found for: \"LABMICR\"  No components found for: \"LDLCHOLESTEROL\", \"LDLCALC\"    (goal LDL is <100)   AST (U/L)   Date Value   06/25/2020 17     ALT (U/L)   Date Value   06/25/2020 15     BUN (mg/dL)   Date Value   03/13/2022 15     BP Readings from Last 3 Encounters:   04/19/24 (!) 142/103   08/04/23 135/85   07/25/23 (!) 160/94          (goal 120/80)        Patient Active Problem List:     Major depressive disorder, recurrent episode, severe (HCC)     Bilateral lower abdominal pain     Irritable bowel syndrome with constipation and diarrhea     Uterine leiomyoma     Mirena IUD (1/24/17)     Abnormal uterine bleeding (AUB)     Small bowel obstruction (HCC)     Essential hypertension     Seasonal allergies     Sleep difficulties     Bilateral carpal tunnel syndrome     Bipolar 1 disorder (HCC)     Atypical chest pain     Trigger finger of left thumb     Carpal tunnel syndrome of right wrist

## 2024-06-12 DIAGNOSIS — I10 ESSENTIAL HYPERTENSION: ICD-10-CM

## 2024-06-13 NOTE — TELEPHONE ENCOUNTER
E-scribe request for CLONIDINE. Please review and e-scribe if applicable.     Last Visit Date:  4/19/24  Next Visit Date:  Visit date not found    Hemoglobin A1C (%)   Date Value   02/20/2023 5.5   09/16/2019 5.5   12/21/2016 6.1 (H)             ( goal A1C is < 7)   No components found for: \"LABMICR\"  No components found for: \"LDLCHOLESTEROL\", \"LDLCALC\"    (goal LDL is <100)   AST (U/L)   Date Value   06/25/2020 17     ALT (U/L)   Date Value   06/25/2020 15     BUN (mg/dL)   Date Value   03/13/2022 15     BP Readings from Last 3 Encounters:   04/19/24 (!) 142/103   08/04/23 135/85   07/25/23 (!) 160/94          (goal 120/80)        Patient Active Problem List:     Major depressive disorder, recurrent episode, severe (HCC)     Bilateral lower abdominal pain     Irritable bowel syndrome with constipation and diarrhea     Uterine leiomyoma     Mirena IUD (1/24/17)     Abnormal uterine bleeding (AUB)     Small bowel obstruction (HCC)     Essential hypertension     Seasonal allergies     Sleep difficulties     Bilateral carpal tunnel syndrome     Bipolar 1 disorder (HCC)     Atypical chest pain     Trigger finger of left thumb     Carpal tunnel syndrome of right wrist      ----JF

## 2024-06-14 RX ORDER — CLONIDINE HYDROCHLORIDE 0.3 MG/1
TABLET ORAL
Qty: 60 TABLET | Refills: 1 | Status: SHIPPED | OUTPATIENT
Start: 2024-06-14

## 2024-09-11 NOTE — PROCEDURES
[address]                  [address] [facility] [address]                              CARDIAC STRESS TEST    PATIENT NAME: Chace Brown                 :        1970  MED REC NO:   [id number]                             ROOM:       0345  ACCOUNT NO:   [de-identified]                           ADMIT DATE: 2022  PROVIDER:     Susan Pennington MD    DATE OF STUDY:  2022    ORDERING PROVIDER:  Laurie Mann MD    PRIMARY CARE PROVIDER:  Martina Waller MD    INTERPRETING PHYSICIAN:  Susan Pennington MD    [facility] STRESS STUDY:  Indication:  chest pain    Procedure was explained and consent form was signed    Medications:  Lexiscan, 0.4 mg  Resting heart rate:   69 bpm  Resting blood pressure:  151/103 mm/Hg  Infusion heart rate:  114 bpm  Infusion blood pressure:  179/93 mm/Hg  Resting EKG:  Normal  Stress heart response:  Normal response  Stress BP response:  Appropriate  Stress EKG(S):  No changes seen (PVC's)  Chest discomfort:  None  Ischemic EKG changes:  None    IMPRESSION:  Electrocardiographically negative Lexiscan stress study. Radio-isotope  results to follow from the department of Nuclear Medicine.           Jeancarlos Pop MD    D: 2022 15:26:57       T: 2022 15:28:43     /EDDI  Job#: [id number]     Doc#: Unknown    CC:    ()
Illogical/Impaired reasoning/Unable to assess

## 2024-10-01 DIAGNOSIS — I10 ESSENTIAL HYPERTENSION: ICD-10-CM

## 2024-10-01 DIAGNOSIS — K58.2 IRRITABLE BOWEL SYNDROME WITH CONSTIPATION AND DIARRHEA: ICD-10-CM

## 2024-10-01 NOTE — TELEPHONE ENCOUNTER
Last visit: 04/19/2024  Last Med refill: 12/15/2023-04/23/2024-05/14/2024  Does patient have enough medication for 72 hours: No: LVM for a return call to schedule for med refills and new to provider appointment    Next Visit Date:  No future appointments.    Health Maintenance   Topic Date Due    HIV screen  Never done    Hepatitis C screen  Never done    Hepatitis B vaccine (1 of 3 - 19+ 3-dose series) Never done    Breast cancer screen  08/01/2021    Shingles vaccine (1 of 2) Never done    Lung Cancer Screening &/or Counseling  Never done    Flu vaccine (1) 08/01/2024    COVID-19 Vaccine (2 - 2023-24 season) 09/01/2024    Lipids  09/16/2024    Depression Monitoring  04/19/2025    Diabetes screen  02/20/2026    Colorectal Cancer Screen  02/26/2029    DTaP/Tdap/Td vaccine (2 - Td or Tdap) 10/09/2029    Hepatitis A vaccine  Aged Out    Hib vaccine  Aged Out    Polio vaccine  Aged Out    Meningococcal (ACWY) vaccine  Aged Out    Pneumococcal 0-64 years Vaccine  Aged Out    Cervical cancer screen  Discontinued       Hemoglobin A1C (%)   Date Value   02/20/2023 5.5   09/16/2019 5.5   12/21/2016 6.1 (H)             ( goal A1C is < 7)   No components found for: \"LABMICR\"  No components found for: \"LDLCHOLESTEROL\", \"LDLCALC\"    (goal LDL is <100)   AST (U/L)   Date Value   06/25/2020 17     ALT (U/L)   Date Value   06/25/2020 15     BUN (mg/dL)   Date Value   03/13/2022 15     BP Readings from Last 3 Encounters:   04/19/24 (!) 142/103   08/04/23 135/85   07/25/23 (!) 160/94          (goal 120/80)    All Future Testing planned in [facility]  Lab Frequency Next Occurrence   CBC with Auto Differential Once 04/19/2024   Basic Metabolic Panel Once 04/19/2024   Lipid Panel Once 04/19/2024   Hepatitis C Antibody Once 04/19/2024   JEAN DIGITAL SCREEN W OR WO CAD BILATERAL Once 04/19/2024               Patient Active Problem List:     Major depressive disorder, recurrent episode, severe (HCC)     Bilateral lower abdominal pain

## 2024-10-14 RX ORDER — MONTELUKAST SODIUM 4 MG/1
1 TABLET, CHEWABLE ORAL EVERY MORNING
Qty: 30 TABLET | Refills: 2 | Status: SHIPPED | OUTPATIENT
Start: 2024-10-14

## 2024-10-14 RX ORDER — HYDROCHLOROTHIAZIDE 25 MG/1
TABLET ORAL
Qty: 30 TABLET | Refills: 3 | Status: SHIPPED | OUTPATIENT
Start: 2024-10-14

## 2024-10-14 RX ORDER — AMLODIPINE BESYLATE 5 MG/1
5 TABLET ORAL DAILY
Qty: 30 TABLET | Refills: 1 | Status: SHIPPED | OUTPATIENT
Start: 2024-10-14

## 2024-10-14 RX ORDER — CLONIDINE HYDROCHLORIDE 0.3 MG/1
TABLET ORAL
Qty: 60 TABLET | Refills: 1 | Status: SHIPPED | OUTPATIENT
Start: 2024-10-14

## 2024-10-14 RX ORDER — LOSARTAN POTASSIUM 25 MG/1
25 TABLET ORAL DAILY
Qty: 30 TABLET | Refills: 2 | Status: SHIPPED | OUTPATIENT
Start: 2024-10-14

## 2024-11-12 DIAGNOSIS — I10 ESSENTIAL HYPERTENSION: ICD-10-CM

## 2024-11-12 RX ORDER — AMLODIPINE BESYLATE 5 MG/1
5 TABLET ORAL DAILY
Qty: 30 TABLET | Refills: 1 | Status: SHIPPED | OUTPATIENT
Start: 2024-11-12

## 2024-11-12 NOTE — TELEPHONE ENCOUNTER
Last visit: 04/19/2024  Last Med refill: 10/14/2024  Does patient have enough medication for 72 hours: No: LVM FOR A RETURN CALL TO SCHEDULE FOR A NTP- MED REFILLS / MESSAGE SENT IN MY CHART    Next Visit Date:  No future appointments.    Health Maintenance   Topic Date Due    HIV screen  Never done    Hepatitis C screen  Never done    Hepatitis B vaccine (1 of 3 - 19+ 3-dose series) Never done    Breast cancer screen  08/01/2021    Shingles vaccine (1 of 2) Never done    Lung Cancer Screening &/or Counseling  Never done    Flu vaccine (1) 08/01/2024    COVID-19 Vaccine (2 - 2023-24 season) 09/01/2024    Lipids  09/16/2024    Depression Monitoring  04/19/2025    Diabetes screen  02/20/2026    Colorectal Cancer Screen  02/26/2029    DTaP/Tdap/Td vaccine (2 - Td or Tdap) 10/09/2029    Hepatitis A vaccine  Aged Out    Hib vaccine  Aged Out    Polio vaccine  Aged Out    Meningococcal (ACWY) vaccine  Aged Out    Pneumococcal 0-64 years Vaccine  Aged Out    Cervical cancer screen  Discontinued       Hemoglobin A1C (%)   Date Value   02/20/2023 5.5   09/16/2019 5.5   12/21/2016 6.1 (H)             ( goal A1C is < 7)   No components found for: \"LABMICR\"  No components found for: \"LDLCHOLESTEROL\", \"LDLCALC\"    (goal LDL is <100)   AST (U/L)   Date Value   06/25/2020 17     ALT (U/L)   Date Value   06/25/2020 15     BUN (mg/dL)   Date Value   03/13/2022 15     BP Readings from Last 3 Encounters:   04/19/24 (!) 142/103   08/04/23 135/85   07/25/23 (!) 160/94          (goal 120/80)    All Future Testing planned in [facility]  Lab Frequency Next Occurrence   CBC with Auto Differential Once 04/19/2024   Basic Metabolic Panel Once 04/19/2024   Lipid Panel Once 04/19/2024   Hepatitis C Antibody Once 04/19/2024   JEAN DIGITAL SCREEN W OR WO CAD BILATERAL Once 04/19/2024               Patient Active Problem List:     Major depressive disorder, recurrent episode, severe (HCC)     Bilateral lower abdominal pain     Irritable bowel

## 2024-11-26 DIAGNOSIS — I10 ESSENTIAL HYPERTENSION: ICD-10-CM

## 2024-11-27 RX ORDER — CLONIDINE HYDROCHLORIDE 0.3 MG/1
TABLET ORAL
Qty: 60 TABLET | Refills: 1 | Status: SHIPPED | OUTPATIENT
Start: 2024-11-27

## 2024-11-27 NOTE — TELEPHONE ENCOUNTER
Writer left vm informing patient to call office to get scheduled with NTP. Patient last seen by Dr. Londono.       Please address the medication refill and close the encounter.  If I can be of assistance, please route to the applicable pool.      Thank you.      Last visit: 4-19-24  Last Med refill: 10-14-24  Does patient have enough medication for 72 hours: No:     Next Visit Date:  No future appointments.    Health Maintenance   Topic Date Due    HIV screen  Never done    Hepatitis C screen  Never done    Hepatitis B vaccine (1 of 3 - 19+ 3-dose series) Never done    Breast cancer screen  08/01/2021    Shingles vaccine (1 of 2) Never done    Lung Cancer Screening &/or Counseling  Never done    Flu vaccine (1) 08/01/2024    COVID-19 Vaccine (2 - 2023-24 season) 09/01/2024    Lipids  09/16/2024    Depression Monitoring  04/19/2025    Diabetes screen  02/20/2026    Colorectal Cancer Screen  02/26/2029    DTaP/Tdap/Td vaccine (2 - Td or Tdap) 10/09/2029    Hepatitis A vaccine  Aged Out    Hib vaccine  Aged Out    Polio vaccine  Aged Out    Meningococcal (ACWY) vaccine  Aged Out    Pneumococcal 0-64 years Vaccine  Aged Out    Cervical cancer screen  Discontinued       Hemoglobin A1C (%)   Date Value   02/20/2023 5.5   09/16/2019 5.5   12/21/2016 6.1 (H)             ( goal A1C is < 7)   No components found for: \"LABMICR\"  No components found for: \"LDLCHOLESTEROL\", \"LDLCALC\"    (goal LDL is <100)   AST (U/L)   Date Value   06/25/2020 17     ALT (U/L)   Date Value   06/25/2020 15     BUN (mg/dL)   Date Value   03/13/2022 15     BP Readings from Last 3 Encounters:   04/19/24 (!) 142/103   08/04/23 135/85   07/25/23 (!) 160/94          (goal 120/80)    All Future Testing planned in [facility]  Lab Frequency Next Occurrence   CBC with Auto Differential Once 04/19/2024   Basic Metabolic Panel Once 04/19/2024   Lipid Panel Once 04/19/2024   Hepatitis C Antibody Once 04/19/2024   JEAN DIGITAL SCREEN W OR WO CAD BILATERAL Once

## 2024-12-02 DIAGNOSIS — Z76.0 MEDICATION REFILL: ICD-10-CM

## 2024-12-02 DIAGNOSIS — I10 ESSENTIAL HYPERTENSION: ICD-10-CM

## 2024-12-02 RX ORDER — CLONIDINE HYDROCHLORIDE 0.3 MG/1
TABLET ORAL
Qty: 60 TABLET | Refills: 0 | Status: SHIPPED | OUTPATIENT
Start: 2024-12-02

## 2024-12-02 RX ORDER — POLYETHYLENE GLYCOL 3350 17 G/17G
17 POWDER, FOR SOLUTION ORAL DAILY PRN
Qty: 510 G | Refills: 0 | Status: SHIPPED | OUTPATIENT
Start: 2024-12-02

## 2024-12-02 RX ORDER — FAMOTIDINE 20 MG/1
20 TABLET, FILM COATED ORAL 2 TIMES DAILY
Qty: 60 TABLET | Refills: 0 | Status: SHIPPED | OUTPATIENT
Start: 2024-12-02

## 2024-12-02 RX ORDER — PSEUDOEPHED/ACETAMINOPH/DIPHEN 30MG-500MG
500 TABLET ORAL EVERY 4 HOURS PRN
Qty: 120 TABLET | Refills: 0 | Status: SHIPPED | OUTPATIENT
Start: 2024-12-02

## 2024-12-02 RX ORDER — HYDROCHLOROTHIAZIDE 25 MG/1
TABLET ORAL
Qty: 30 TABLET | Refills: 0 | Status: SHIPPED | OUTPATIENT
Start: 2024-12-02

## 2024-12-02 RX ORDER — ALBUTEROL SULFATE 90 UG/1
AEROSOL, METERED RESPIRATORY (INHALATION)
Qty: 18 G | Refills: 0 | Status: SHIPPED | OUTPATIENT
Start: 2024-12-02

## 2024-12-03 NOTE — TELEPHONE ENCOUNTER
Please call patient to schedule an appointment. Patient should also complete labs ordered last time for further refills. Thank you.  
Writer called patient to schedule a follow up per provider, writer had to LVM for patient to call office to schedule.   
Abnormal uterine bleeding (AUB)     Small bowel obstruction (HCC)     Essential hypertension     Seasonal allergies     Sleep difficulties     Bilateral carpal tunnel syndrome     Bipolar 1 disorder (HCC)     Atypical chest pain     Trigger finger of left thumb     Carpal tunnel syndrome of right wrist

## 2024-12-18 DIAGNOSIS — Z76.0 MEDICATION REFILL: ICD-10-CM

## 2024-12-18 RX ORDER — POLYETHYLENE GLYCOL 3350 17 G/17G
POWDER, FOR SOLUTION ORAL
Qty: 510 G | Refills: 10 | Status: SHIPPED | OUTPATIENT
Start: 2024-12-18

## 2024-12-18 RX ORDER — PSEUDOEPHED/ACETAMINOPH/DIPHEN 30MG-500MG
500 TABLET ORAL EVERY 4 HOURS PRN
Qty: 120 TABLET | Refills: 10 | Status: SHIPPED | OUTPATIENT
Start: 2024-12-18

## 2024-12-18 RX ORDER — FAMOTIDINE 20 MG/1
20 TABLET, FILM COATED ORAL 2 TIMES DAILY
Qty: 60 TABLET | Refills: 10 | Status: SHIPPED | OUTPATIENT
Start: 2024-12-18

## 2024-12-18 RX ORDER — ALBUTEROL SULFATE 90 UG/1
AEROSOL, METERED RESPIRATORY (INHALATION)
Qty: 18 G | Refills: 10 | Status: SHIPPED | OUTPATIENT
Start: 2024-12-18

## 2024-12-18 NOTE — TELEPHONE ENCOUNTER
Last seen by Dr. Londono       Please address the medication refill and close the encounter.  If I can be of assistance, please route to the applicable pool.      Thank you.      Last visit: 4-19-24  Last Med refill: 12-2-2024  Does patient have enough medication for 72 hours: No:     Next Visit Date:  Future Appointments   Date Time Provider Department Center   1/14/2025  3:45 PM Petr Fox MD [facility]       Health Maintenance   Topic Date Due    HIV screen  Never done    Hepatitis C screen  Never done    Hepatitis B vaccine (1 of 3 - 19+ 3-dose series) Never done    Breast cancer screen  08/01/2021    Shingles vaccine (1 of 2) Never done    Lung Cancer Screening &/or Counseling  Never done    Flu vaccine (1) 08/01/2024    COVID-19 Vaccine (2 - 2023-24 season) 09/01/2024    Lipids  09/16/2024    Depression Monitoring  04/19/2025    Diabetes screen  02/20/2026    Colorectal Cancer Screen  02/26/2029    DTaP/Tdap/Td vaccine (2 - Td or Tdap) 10/09/2029    Hepatitis A vaccine  Aged Out    Hib vaccine  Aged Out    Polio vaccine  Aged Out    Meningococcal (ACWY) vaccine  Aged Out    Pneumococcal 0-64 years Vaccine  Aged Out    Cervical cancer screen  Discontinued       Hemoglobin A1C (%)   Date Value   02/20/2023 5.5   09/16/2019 5.5   12/21/2016 6.1 (H)             ( goal A1C is < 7)   No components found for: \"LABMICR\"  No components found for: \"LDLCHOLESTEROL\", \"LDLCALC\"    (goal LDL is <100)   AST (U/L)   Date Value   06/25/2020 17     ALT (U/L)   Date Value   06/25/2020 15     BUN (mg/dL)   Date Value   03/13/2022 15     BP Readings from Last 3 Encounters:   04/19/24 (!) 142/103   08/04/23 135/85   07/25/23 (!) 160/94          (goal 120/80)    All Future Testing planned in [facility]  Lab Frequency Next Occurrence   CBC with Auto Differential Once 04/19/2024   Basic Metabolic Panel Once 04/19/2024   Lipid Panel Once 04/19/2024   Hepatitis C Antibody Once 04/19/2024   JEAN DIGITAL SCREEN W OR WO CAD

## 2024-12-20 DIAGNOSIS — I10 ESSENTIAL HYPERTENSION: ICD-10-CM

## 2024-12-23 RX ORDER — AMLODIPINE BESYLATE 5 MG/1
5 TABLET ORAL DAILY
Qty: 30 TABLET | Refills: 10 | Status: SHIPPED | OUTPATIENT
Start: 2024-12-23

## 2024-12-23 NOTE — TELEPHONE ENCOUNTER
Last visit: 4/19/24  Last Med refill: 11/12/24  Does patient have enough medication for 72 hours: no    Next Visit Date:1/14/25  Future Appointments   Date Time Provider Department Center   1/14/2025  3:45 PM Petr Fox MD [facility] DE[facility]       Health Maintenance   Topic Date Due    HIV screen  Never done    Hepatitis C screen  Never done    Hepatitis B vaccine (1 of 3 - 19+ 3-dose series) Never done    Breast cancer screen  08/01/2021    Shingles vaccine (1 of 2) Never done    Lung Cancer Screening &/or Counseling  Never done    Flu vaccine (1) 08/01/2024    COVID-19 Vaccine (2 - 2023-24 season) 09/01/2024    Lipids  09/16/2024    Depression Monitoring  04/19/2025    Diabetes screen  02/20/2026    Colorectal Cancer Screen  02/26/2029    DTaP/Tdap/Td vaccine (2 - Td or Tdap) 10/09/2029    Hepatitis A vaccine  Aged Out    Hib vaccine  Aged Out    Polio vaccine  Aged Out    Meningococcal (ACWY) vaccine  Aged Out    Pneumococcal 0-64 years Vaccine  Aged Out    Cervical cancer screen  Discontinued       Hemoglobin A1C (%)   Date Value   02/20/2023 5.5   09/16/2019 5.5   12/21/2016 6.1 (H)             ( goal A1C is < 7)   No components found for: \"LABMICR\"  No components found for: \"LDLCHOLESTEROL\", \"LDLCALC\"    (goal LDL is <100)   AST (U/L)   Date Value   06/25/2020 17     ALT (U/L)   Date Value   06/25/2020 15     BUN (mg/dL)   Date Value   03/13/2022 15     BP Readings from Last 3 Encounters:   04/19/24 (!) 142/103   08/04/23 135/85   07/25/23 (!) 160/94          (goal 120/80)    All Future Testing planned in [facility]  Lab Frequency Next Occurrence   CBC with Auto Differential Once 04/19/2024   Basic Metabolic Panel Once 04/19/2024   Lipid Panel Once 04/19/2024   Hepatitis C Antibody Once 04/19/2024   JEAN DIGITAL SCREEN W OR WO CAD BILATERAL Once 04/19/2024               Patient Active Problem List:     Major depressive disorder, recurrent episode, severe (HCC)     Bilateral lower abdominal pain

## 2024-12-26 DIAGNOSIS — I10 ESSENTIAL HYPERTENSION: ICD-10-CM

## 2024-12-26 NOTE — TELEPHONE ENCOUNTER
Last visit: 04/19/2024  Last Med refill: 12/02/2024-  Does patient have enough medication for 72 hours: No:     Next Visit Date:  Future Appointments   Date Time Provider Department Center   1/14/2025  3:45 PM Petr Fox MD [facility] DE[facility]       Health Maintenance   Topic Date Due    HIV screen  Never done    Hepatitis C screen  Never done    Hepatitis B vaccine (1 of 3 - 19+ 3-dose series) Never done    Breast cancer screen  08/01/2021    Shingles vaccine (1 of 2) Never done    Lung Cancer Screening &/or Counseling  Never done    Flu vaccine (1) 08/01/2024    COVID-19 Vaccine (2 - 2023-24 season) 09/01/2024    Lipids  09/16/2024    Depression Monitoring  04/19/2025    Diabetes screen  02/20/2026    Colorectal Cancer Screen  02/26/2029    DTaP/Tdap/Td vaccine (2 - Td or Tdap) 10/09/2029    Hepatitis A vaccine  Aged Out    Hib vaccine  Aged Out    Polio vaccine  Aged Out    Meningococcal (ACWY) vaccine  Aged Out    Pneumococcal 0-64 years Vaccine  Aged Out    Cervical cancer screen  Discontinued       Hemoglobin A1C (%)   Date Value   02/20/2023 5.5   09/16/2019 5.5   12/21/2016 6.1 (H)             ( goal A1C is < 7)   No components found for: \"LABMICR\"  No components found for: \"LDLCHOLESTEROL\", \"LDLCALC\"    (goal LDL is <100)   AST (U/L)   Date Value   06/25/2020 17     ALT (U/L)   Date Value   06/25/2020 15     BUN (mg/dL)   Date Value   03/13/2022 15     BP Readings from Last 3 Encounters:   04/19/24 (!) 142/103   08/04/23 135/85   07/25/23 (!) 160/94          (goal 120/80)    All Future Testing planned in [facility]  Lab Frequency Next Occurrence   CBC with Auto Differential Once 04/19/2024   Basic Metabolic Panel Once 04/19/2024   Lipid Panel Once 04/19/2024   Hepatitis C Antibody Once 04/19/2024   JEAN DIGITAL SCREEN W OR WO CAD BILATERAL Once 04/19/2024               Patient Active Problem List:     Major depressive disorder, recurrent episode, severe (HCC)     Bilateral lower abdominal pain

## 2024-12-27 RX ORDER — LOSARTAN POTASSIUM 25 MG/1
25 TABLET ORAL DAILY
Qty: 30 TABLET | Refills: 10 | OUTPATIENT
Start: 2024-12-27

## 2024-12-27 RX ORDER — OMEPRAZOLE 20 MG/1
CAPSULE, DELAYED RELEASE ORAL
Qty: 30 CAPSULE | Refills: 10 | OUTPATIENT
Start: 2024-12-27

## 2024-12-27 RX ORDER — LOSARTAN POTASSIUM 25 MG/1
25 TABLET ORAL DAILY
Qty: 30 TABLET | Refills: 2 | OUTPATIENT
Start: 2024-12-27

## 2024-12-27 RX ORDER — HYDROCHLOROTHIAZIDE 25 MG/1
TABLET ORAL
Qty: 30 TABLET | Refills: 10 | OUTPATIENT
Start: 2024-12-27

## 2024-12-27 RX ORDER — CLONIDINE HYDROCHLORIDE 0.3 MG/1
TABLET ORAL
Qty: 60 TABLET | Refills: 10 | OUTPATIENT
Start: 2024-12-27

## 2024-12-27 RX ORDER — HYDROCHLOROTHIAZIDE 25 MG/1
TABLET ORAL
Qty: 30 TABLET | Refills: 0 | OUTPATIENT
Start: 2024-12-27

## 2024-12-28 DIAGNOSIS — I10 ESSENTIAL HYPERTENSION: ICD-10-CM

## 2024-12-28 RX ORDER — CLONIDINE HYDROCHLORIDE 0.3 MG/1
TABLET ORAL
Qty: 60 TABLET | Refills: 0 | Status: SHIPPED | OUTPATIENT
Start: 2024-12-28

## 2024-12-28 RX ORDER — HYDROCHLOROTHIAZIDE 25 MG/1
TABLET ORAL
Qty: 30 TABLET | Refills: 0 | Status: SHIPPED | OUTPATIENT
Start: 2024-12-28

## 2024-12-28 RX ORDER — LOSARTAN POTASSIUM 25 MG/1
25 TABLET ORAL DAILY
Qty: 30 TABLET | Refills: 2 | Status: SHIPPED | OUTPATIENT
Start: 2024-12-28

## 2025-01-14 ENCOUNTER — OFFICE VISIT (OUTPATIENT)
Dept: FAMILY MEDICINE CLINIC | Age: 55
End: 2025-01-14
Payer: COMMERCIAL

## 2025-01-14 VITALS
HEART RATE: 77 BPM | SYSTOLIC BLOOD PRESSURE: 139 MMHG | TEMPERATURE: 98.3 F | WEIGHT: 204 LBS | BODY MASS INDEX: 38.57 KG/M2 | DIASTOLIC BLOOD PRESSURE: 90 MMHG

## 2025-01-14 DIAGNOSIS — R06.02 SOB (SHORTNESS OF BREATH): ICD-10-CM

## 2025-01-14 DIAGNOSIS — F33.2 SEVERE EPISODE OF RECURRENT MAJOR DEPRESSIVE DISORDER, WITHOUT PSYCHOTIC FEATURES (HCC): ICD-10-CM

## 2025-01-14 DIAGNOSIS — G47.9 SLEEP DIFFICULTIES: Primary | ICD-10-CM

## 2025-01-14 DIAGNOSIS — K58.2 IRRITABLE BOWEL SYNDROME WITH CONSTIPATION AND DIARRHEA: ICD-10-CM

## 2025-01-14 DIAGNOSIS — I10 ESSENTIAL HYPERTENSION: ICD-10-CM

## 2025-01-14 PROCEDURE — 99211 OFF/OP EST MAY X REQ PHY/QHP: CPT

## 2025-01-14 PROCEDURE — 90656 IIV3 VACC NO PRSV 0.5 ML IM: CPT

## 2025-01-14 RX ORDER — AMLODIPINE BESYLATE 5 MG/1
5 TABLET ORAL DAILY
Qty: 30 TABLET | Refills: 10 | Status: SHIPPED | OUTPATIENT
Start: 2025-01-14

## 2025-01-14 RX ORDER — SERTRALINE HYDROCHLORIDE 100 MG/1
200 TABLET, FILM COATED ORAL DAILY
Qty: 60 TABLET | Refills: 1 | Status: SHIPPED | OUTPATIENT
Start: 2025-01-14 | End: 2025-03-15

## 2025-01-14 RX ORDER — METOPROLOL TARTRATE 25 MG/1
25 TABLET, FILM COATED ORAL 2 TIMES DAILY
Qty: 8 TABLET | Refills: 0 | Status: CANCELLED | OUTPATIENT
Start: 2025-01-14

## 2025-01-14 RX ORDER — HYDROCHLOROTHIAZIDE 25 MG/1
TABLET ORAL
Qty: 30 TABLET | Refills: 0 | Status: SHIPPED | OUTPATIENT
Start: 2025-01-14

## 2025-01-14 RX ORDER — ONDANSETRON 4 MG/1
4 TABLET, ORALLY DISINTEGRATING ORAL 3 TIMES DAILY PRN
Qty: 21 TABLET | Refills: 0 | Status: SHIPPED | OUTPATIENT
Start: 2025-01-14

## 2025-01-14 RX ORDER — CLONIDINE HYDROCHLORIDE 0.3 MG/1
TABLET ORAL
Qty: 60 TABLET | Refills: 0 | Status: SHIPPED | OUTPATIENT
Start: 2025-01-14

## 2025-01-14 SDOH — ECONOMIC STABILITY: FOOD INSECURITY: WITHIN THE PAST 12 MONTHS, THE FOOD YOU BOUGHT JUST DIDN'T LAST AND YOU DIDN'T HAVE MONEY TO GET MORE.: NEVER TRUE

## 2025-01-14 SDOH — ECONOMIC STABILITY: FOOD INSECURITY: WITHIN THE PAST 12 MONTHS, YOU WORRIED THAT YOUR FOOD WOULD RUN OUT BEFORE YOU GOT MONEY TO BUY MORE.: NEVER TRUE

## 2025-01-14 ASSESSMENT — PATIENT HEALTH QUESTIONNAIRE - PHQ9
7. TROUBLE CONCENTRATING ON THINGS, SUCH AS READING THE NEWSPAPER OR WATCHING TELEVISION: NOT AT ALL
4. FEELING TIRED OR HAVING LITTLE ENERGY: SEVERAL DAYS
SUM OF ALL RESPONSES TO PHQ QUESTIONS 1-9: 2
8. MOVING OR SPEAKING SO SLOWLY THAT OTHER PEOPLE COULD HAVE NOTICED. OR THE OPPOSITE, BEING SO FIGETY OR RESTLESS THAT YOU HAVE BEEN MOVING AROUND A LOT MORE THAN USUAL: NOT AT ALL
9. THOUGHTS THAT YOU WOULD BE BETTER OFF DEAD, OR OF HURTING YOURSELF: NOT AT ALL
SUM OF ALL RESPONSES TO PHQ QUESTIONS 1-9: 2
1. LITTLE INTEREST OR PLEASURE IN DOING THINGS: NOT AT ALL
10. IF YOU CHECKED OFF ANY PROBLEMS, HOW DIFFICULT HAVE THESE PROBLEMS MADE IT FOR YOU TO DO YOUR WORK, TAKE CARE OF THINGS AT HOME, OR GET ALONG WITH OTHER PEOPLE: NOT DIFFICULT AT ALL
SUM OF ALL RESPONSES TO PHQ9 QUESTIONS 1 & 2: 0
SUM OF ALL RESPONSES TO PHQ QUESTIONS 1-9: 2
5. POOR APPETITE OR OVEREATING: NOT AT ALL
SUM OF ALL RESPONSES TO PHQ QUESTIONS 1-9: 2
6. FEELING BAD ABOUT YOURSELF - OR THAT YOU ARE A FAILURE OR HAVE LET YOURSELF OR YOUR FAMILY DOWN: NOT AT ALL
2. FEELING DOWN, DEPRESSED OR HOPELESS: NOT AT ALL
3. TROUBLE FALLING OR STAYING ASLEEP: SEVERAL DAYS

## 2025-01-14 NOTE — PROGRESS NOTES
Attending Physician Statement  I have discussed the care of Marnie Galaviz, 54 y.o. female,including pertinent history and exam findings,  with the resident Petr Martinez MD.  History:  Chief Complaint   Patient presents with    Hypertension     Patient here to check her htn       I have reviewed the key elements of the encounter with the resident. Examination was done by resident as documented in residents note.    BP Readings from Last 3 Encounters:   01/14/25 (!) 139/90   04/19/24 (!) 142/103   08/04/23 135/85     BP (!) 139/90   Pulse 77   Temp 98.3 °F (36.8 °C) (Oral)   Wt 92.5 kg (204 lb)   LMP 11/11/2016   BMI 38.57 kg/m²   Lab Results   Component Value Date    WBC 6.9 05/16/2022    HGB 12.3 05/16/2022    HCT 39.2 05/16/2022     05/16/2022    CHOL 146 09/16/2019    TRIG 154 (H) 09/16/2019    HDL 70 09/16/2019    ALT 15 06/25/2020    AST 17 06/25/2020     03/13/2022    K 3.7 03/13/2022     03/13/2022    CREATININE 0.64 03/13/2022    BUN 15 03/13/2022    CO2 23 03/13/2022    TSH 1.03 12/21/2016    LABA1C 5.5 02/20/2023     Lab Results   Component Value Date    CALCIUM 9.0 03/13/2022    PHOS 2.6 06/26/2020     No results found for: \"LDLDIRECT\"  I agree with the assessment, plan and diagnosis of    Diagnosis Orders   1. Essential hypertension        2. Severe episode of recurrent major depressive disorder, without psychotic features (HCC)        3. Irritable bowel syndrome with constipation and diarrhea          I agree with orders as documented by the resident.    Recommendations: Agree with resident assessment and plan.  Patient likely benefit from further workup of her resistant hypertension.  Patient may benefit from obstructive sleep apnea testing, further testing to rule out hyperaldosteronism, further testing possibly in the future pending this workup for renal artery stenosis however patient is been tolerating ARB without any significant issues.  Will also check a urinalysis

## 2025-01-14 NOTE — PROGRESS NOTES
[facility] - Outpatient Note      Subjective:    Marnie Galaviz is a 54 y.o. female with  has a past medical history of Bipolar disorder (HCC), Depression, Gastritis, Hypertension, IBS (irritable bowel syndrome), Murmur, Right carpal tunnel syndrome, and Under care of team.    Presented to the office today for:  Chief Complaint   Patient presents with    Hypertension     Patient here to check her htn       HPI  Patient presents to clinic for hypertension follow up and complain of headaches.     Hypertension Follow-Up  The patient presents for a follow-up on hypertension. Blood pressure today is 139/91. The patient is asymptomatic and denies headaches, chest pain, palpitations, shortness of breath, leg swelling, or leg pain. No other associated symptoms are reported.    Headaches  The patient also reports a history of headaches for the past year, with a recent increase in intensity over the last two weeks. The headaches involve the entire head and are not associated with any specific time, food, or identifiable triggers. They are accompanied by neck pain, stiffness, and pain behind the ears. The patient denies hearing loss but reports poor sleep and fatigue.    Patient does not want to discuss care gaps at this visit.      Review of Systems   Constitutional:  Positive for fatigue. Negative for chills and fever.   HENT:  Negative for ear discharge.    Respiratory:  Positive for shortness of breath. Negative for cough, chest tightness, wheezing and stridor.    Cardiovascular:  Negative for chest pain, palpitations and leg swelling.   Neurological:  Positive for headaches. Negative for dizziness and facial asymmetry.                 The patient has a   Family History   Problem Relation Age of Onset    Hypertension Mother     Diabetes Mother     Hypertension Sister        Objective:    BP (!) 139/90   Pulse 77   Temp 98.3 °F (36.8 °C) (Oral)   Wt 92.5 kg (204

## 2025-01-14 NOTE — PROGRESS NOTES
Visit Information    Have you changed or started any medications since your last visit including any over-the-counter medicines, vitamins, or herbal medicines? no   Have you stopped taking any of your medications? Is so, why? -  no  Are you having any side effects from any of your medications? - no    Have you seen any other physician or provider since your last visit?  no   Have you had any other diagnostic tests since your last visit?  no   Have you been seen in the emergency room and/or had an admission in a hospital since we last saw you?  yes - [facility]   Have you had your routine dental cleaning in the past 6 months?  no     Do you have an active MyChart account? If no, what is the barrier?  Yes    Patient Care Team:  Petr Fox MD as PCP - General (Family Medicine)    Medical History Review  Past Medical, Family, and Social History reviewed and does not contribute to the patient presenting condition    Health Maintenance   Topic Date Due    HIV screen  Never done    Hepatitis C screen  Never done    Hepatitis B vaccine (1 of 3 - 19+ 3-dose series) Never done    Breast cancer screen  08/01/2021    Shingles vaccine (1 of 2) Never done    Lung Cancer Screening &/or Counseling  Never done    Flu vaccine (1) 08/01/2024    COVID-19 Vaccine (2 - 2023-24 season) 09/01/2024    Lipids  09/16/2024    Depression Monitoring  04/19/2025    Diabetes screen  02/20/2026    Colorectal Cancer Screen  02/26/2029    DTaP/Tdap/Td vaccine (3 - Td or Tdap) 08/20/2034    Hepatitis A vaccine  Aged Out    Hib vaccine  Aged Out    Polio vaccine  Aged Out    Meningococcal (ACWY) vaccine  Aged Out    Pneumococcal 0-64 years Vaccine  Aged Out    Cervical cancer screen  Discontinued

## 2025-01-14 NOTE — PATIENT INSTRUCTIONS
Thank you for letting us take care of you today. We hope all your questions were addressed. If a question was overlooked or something else comes to mind after you return home, please contact a member of your Care Team listed below.      Your Care Team at [facility] is Team #  Kevin Walls M.D. (Faculty)  Christopher Tony M.D. (Resident)  Donnell Barrios M.D. (Resident)   Cristiana Schultz M.D. (Resident)  Petr Fox M.D. (Resident)  Batsheva Rodriguez M.D. (Resident)  Yolis White., [facility]  Rosario Arana, [facility]  Maren Brock, [facility]  Fede Barker, [facility]  Sandi Allen, [facility]  Sun Aleman, [facility]  Edi Dial (LJ) JOS[facility] Nicolas (Clinical Practice Manager)  Yasmine Daly [facility] (Clinical Pharmacist)     Office phone number: [phone]    If you need to get in right away due to illness, please be advised we have \"Same Day\" appointments available Monday-Friday. Please call us at [phone] option #3 to schedule your \"Same Day\" appointment.

## 2025-02-18 DIAGNOSIS — G47.9 SLEEP DIFFICULTIES: Primary | ICD-10-CM

## 2025-02-25 ENCOUNTER — HOSPITAL ENCOUNTER (OUTPATIENT)
Dept: SLEEP CENTER | Age: 55
Discharge: HOME | End: 2025-02-27
Payer: COMMERCIAL

## 2025-02-25 DIAGNOSIS — G47.9 SLEEP DIFFICULTIES: ICD-10-CM

## 2025-02-25 PROCEDURE — G0399 HOME SLEEP TEST/TYPE 3 PORTA: HCPCS

## 2025-03-06 DIAGNOSIS — I10 ESSENTIAL HYPERTENSION: ICD-10-CM

## 2025-03-07 RX ORDER — LOSARTAN POTASSIUM 25 MG/1
25 TABLET ORAL DAILY
Qty: 30 TABLET | Refills: 2 | Status: SHIPPED | OUTPATIENT
Start: 2025-03-07

## 2025-03-07 NOTE — TELEPHONE ENCOUNTER
Last visit: 01/14/2025  Last Med refill: 12/28/2024  Does patient have enough medication for 72 hours: No:     Next Visit Date:  No future appointments.    Health Maintenance   Topic Date Due    HIV screen  Never done    Hepatitis C screen  Never done    Hepatitis B vaccine (1 of 3 - 19+ 3-dose series) Never done    Breast cancer screen  08/01/2021    Shingles vaccine (1 of 2) Never done    Pneumococcal 50+ years Vaccine (1 of 1 - PCV) Never done    Lung Cancer Screening &/or Counseling  Never done    COVID-19 Vaccine (2 - 2024-25 season) 09/01/2024    Lipids  09/16/2024    Depression Monitoring  01/14/2026    Colorectal Cancer Screen  02/26/2029    DTaP/Tdap/Td vaccine (3 - Td or Tdap) 08/20/2034    Flu vaccine  Completed    Hepatitis A vaccine  Aged Out    Hib vaccine  Aged Out    Polio vaccine  Aged Out    Meningococcal (ACWY) vaccine  Aged Out    Diabetes screen  Discontinued    Cervical cancer screen  Discontinued       Hemoglobin A1C (%)   Date Value   02/20/2023 5.5   09/16/2019 5.5   12/21/2016 6.1 (H)             ( goal A1C is < 7)   No components found for: \"LABMICR\"  No components found for: \"LDLCHOLESTEROL\", \"LDLCALC\"    (goal LDL is <100)   AST (U/L)   Date Value   06/25/2020 17     ALT (U/L)   Date Value   06/25/2020 15     BUN (mg/dL)   Date Value   03/13/2022 15     BP Readings from Last 3 Encounters:   01/14/25 (!) 139/90   04/19/24 (!) 142/103   08/04/23 135/85          (goal 120/80)    All Future Testing planned in [facility]  Lab Frequency Next Occurrence   CBC with Auto Differential Once 04/19/2024   Basic Metabolic Panel Once 04/19/2024   Lipid Panel Once 04/19/2024   Hepatitis C Antibody Once 04/19/2024   JEAN DIGITAL SCREEN W OR WO CAD BILATERAL Once 04/19/2024   Baseline Diagnostic Sleep Study Once 02/14/2025   Full PFT Study With Bronchodilator Once 02/14/2025   Alpha-1-Antitrypsin Once 01/14/2025   Urinalysis Once 01/14/2025   CBC with Auto Differential Once 01/14/2025   Basic Metabolic Panel

## 2025-03-21 DIAGNOSIS — I10 ESSENTIAL HYPERTENSION: ICD-10-CM

## 2025-03-21 DIAGNOSIS — R42 DIZZINESS: ICD-10-CM

## 2025-03-21 DIAGNOSIS — K58.2 IRRITABLE BOWEL SYNDROME WITH CONSTIPATION AND DIARRHEA: ICD-10-CM

## 2025-03-21 DIAGNOSIS — Z76.0 MEDICATION REFILL: ICD-10-CM

## 2025-03-21 RX ORDER — COLESTIPOL HYDROCHLORIDE 1 G/1
1 TABLET ORAL EVERY MORNING
Qty: 30 TABLET | Refills: 2 | Status: SHIPPED | OUTPATIENT
Start: 2025-03-21

## 2025-03-21 RX ORDER — MECLIZINE HCL 12.5 MG 12.5 MG/1
TABLET ORAL
Qty: 30 TABLET | Refills: 10 | Status: SHIPPED | OUTPATIENT
Start: 2025-03-21

## 2025-03-21 RX ORDER — LORATADINE 10 MG/1
TABLET ORAL
Qty: 30 TABLET | Refills: 10 | Status: SHIPPED | OUTPATIENT
Start: 2025-03-21

## 2025-03-21 RX ORDER — CLONIDINE HYDROCHLORIDE 0.3 MG/1
TABLET ORAL
Qty: 60 TABLET | Refills: 0 | Status: SHIPPED | OUTPATIENT
Start: 2025-03-21

## 2025-03-21 RX ORDER — HYDROCHLOROTHIAZIDE 25 MG/1
TABLET ORAL
Qty: 30 TABLET | Refills: 0 | Status: SHIPPED | OUTPATIENT
Start: 2025-03-21

## 2025-03-21 RX ORDER — FOLIC ACID 1 MG/1
TABLET ORAL
Qty: 30 TABLET | Refills: 10 | Status: SHIPPED | OUTPATIENT
Start: 2025-03-21

## 2025-03-21 RX ORDER — LOSARTAN POTASSIUM 25 MG/1
25 TABLET ORAL DAILY
Qty: 30 TABLET | Refills: 2 | Status: SHIPPED | OUTPATIENT
Start: 2025-03-21

## 2025-03-21 NOTE — TELEPHONE ENCOUNTER
01/14/2025   Basic Metabolic Panel Once 01/14/2025   Renin Once 01/14/2025   US RENAL COMPLETE Once 01/14/2025   Aldosterone Once 01/14/2025               Patient Active Problem List:     Major depressive disorder, recurrent episode, severe (HCC)     Bilateral lower abdominal pain     Irritable bowel syndrome with constipation and diarrhea     Uterine leiomyoma     Mirena IUD (1/24/17)     Abnormal uterine bleeding (AUB)     Small bowel obstruction (HCC)     Essential hypertension     Seasonal allergies     Sleep difficulties     Bilateral carpal tunnel syndrome     Bipolar 1 disorder (HCC)     Atypical chest pain     Trigger finger of left thumb     Carpal tunnel syndrome of right wrist

## 2025-04-05 DIAGNOSIS — I10 ESSENTIAL HYPERTENSION: ICD-10-CM

## 2025-04-07 RX ORDER — CLONIDINE HYDROCHLORIDE 0.3 MG/1
0.3 TABLET ORAL 2 TIMES DAILY
Qty: 60 TABLET | Refills: 11 | Status: SHIPPED | OUTPATIENT
Start: 2025-04-07

## 2025-04-07 RX ORDER — HYDROCHLOROTHIAZIDE 25 MG/1
25 TABLET ORAL DAILY
Qty: 30 TABLET | Refills: 11 | Status: SHIPPED | OUTPATIENT
Start: 2025-04-07

## 2025-04-07 NOTE — TELEPHONE ENCOUNTER
Last visit: 01/14/2025  Last Med refill: 03/21/2025  Does patient have enough medication for 72 hours: No:     Next Visit Date:  No future appointments.    Health Maintenance   Topic Date Due    HIV screen  Never done    Hepatitis C screen  Never done    Hepatitis B vaccine (1 of 3 - 19+ 3-dose series) Never done    Breast cancer screen  08/01/2021    Shingles vaccine (1 of 2) Never done    Pneumococcal 50+ years Vaccine (1 of 1 - PCV) Never done    Lung Cancer Screening &/or Counseling  Never done    COVID-19 Vaccine (2 - 2024-25 season) 09/01/2024    Lipids  09/16/2024    Depression Monitoring  01/14/2026    Colorectal Cancer Screen  02/26/2029    DTaP/Tdap/Td vaccine (3 - Td or Tdap) 08/20/2034    Flu vaccine  Completed    Hepatitis A vaccine  Aged Out    Hib vaccine  Aged Out    Polio vaccine  Aged Out    Meningococcal (ACWY) vaccine  Aged Out    Meningococcal B vaccine  Aged Out    Diabetes screen  Discontinued    Cervical cancer screen  Discontinued       Hemoglobin A1C (%)   Date Value   02/20/2023 5.5   09/16/2019 5.5   12/21/2016 6.1 (H)             ( goal A1C is < 7)   No components found for: \"LABMICR\"  No components found for: \"LDLCHOLESTEROL\", \"LDLCALC\"    (goal LDL is <100)   AST (U/L)   Date Value   06/25/2020 17     ALT (U/L)   Date Value   06/25/2020 15     BUN (mg/dL)   Date Value   03/13/2022 15     BP Readings from Last 3 Encounters:   01/14/25 (!) 139/90   04/19/24 (!) 142/103   08/04/23 135/85          (goal 120/80)    All Future Testing planned in [facility]  Lab Frequency Next Occurrence   CBC with Auto Differential Once 04/19/2024   Basic Metabolic Panel Once 04/19/2024   Lipid Panel Once 04/19/2024   Hepatitis C Antibody Once 04/19/2024   JEAN DIGITAL SCREEN W OR WO CAD BILATERAL Once 04/19/2024   Baseline Diagnostic Sleep Study Once 02/14/2025   Full PFT Study With Bronchodilator Once 02/14/2025   Alpha-1-Antitrypsin Once 01/14/2025   Urinalysis Once 01/14/2025   CBC with Auto Differential

## 2025-05-01 DIAGNOSIS — F33.2 SEVERE EPISODE OF RECURRENT MAJOR DEPRESSIVE DISORDER, WITHOUT PSYCHOTIC FEATURES (HCC): ICD-10-CM

## 2025-05-01 DIAGNOSIS — I10 ESSENTIAL HYPERTENSION: ICD-10-CM

## 2025-05-01 NOTE — TELEPHONE ENCOUNTER
Active Problem List:     Major depressive disorder, recurrent episode, severe (HCC)     Bilateral lower abdominal pain     Irritable bowel syndrome with constipation and diarrhea     Uterine leiomyoma     Mirena IUD (1/24/17)     Abnormal uterine bleeding (AUB)     Small bowel obstruction (HCC)     Essential hypertension     Seasonal allergies     Sleep difficulties     Bilateral carpal tunnel syndrome     Bipolar 1 disorder (HCC)     Atypical chest pain     Trigger finger of left thumb     Carpal tunnel syndrome of right wrist

## 2025-05-02 RX ORDER — HYDROCHLOROTHIAZIDE 25 MG/1
25 TABLET ORAL DAILY
Qty: 30 TABLET | Refills: 11 | Status: SHIPPED | OUTPATIENT
Start: 2025-05-02

## 2025-05-02 RX ORDER — SERTRALINE HYDROCHLORIDE 100 MG/1
200 TABLET, FILM COATED ORAL DAILY
Qty: 60 TABLET | Refills: 1 | Status: SHIPPED | OUTPATIENT
Start: 2025-05-02 | End: 2025-07-01

## 2025-05-06 DIAGNOSIS — G47.33 OSA (OBSTRUCTIVE SLEEP APNEA): Primary | ICD-10-CM

## 2025-05-06 NOTE — PROGRESS NOTES
Received a fax regarding patient's sleep study.  Patient had a sleep sleep study and showed positive for obstructive sleep apnea with an apnea hypopnea index of 8.5.  Per pulmonology, patient needs to return for CPAP titration sleep study. Placing orders for CPAP titration sleep study.

## 2025-05-19 ENCOUNTER — TELEPHONE (OUTPATIENT)
Age: 55
End: 2025-05-19

## 2025-05-19 NOTE — TELEPHONE ENCOUNTER
Writer called patient to inform provider ordered an CPAP titration sleep study.  provided [facility] contact number, [phone]. Writer left voicemail informing patient to call to schedule the CPAP titration sleep study.

## 2025-06-09 DIAGNOSIS — F33.2 SEVERE EPISODE OF RECURRENT MAJOR DEPRESSIVE DISORDER, WITHOUT PSYCHOTIC FEATURES (HCC): ICD-10-CM

## 2025-06-09 DIAGNOSIS — I10 ESSENTIAL HYPERTENSION: ICD-10-CM

## 2025-06-09 DIAGNOSIS — K58.2 IRRITABLE BOWEL SYNDROME WITH CONSTIPATION AND DIARRHEA: ICD-10-CM

## 2025-06-10 RX ORDER — LOSARTAN POTASSIUM 25 MG/1
25 TABLET ORAL DAILY
Qty: 30 TABLET | Refills: 11 | Status: SHIPPED | OUTPATIENT
Start: 2025-06-10

## 2025-06-10 RX ORDER — SERTRALINE HYDROCHLORIDE 100 MG/1
200 TABLET, FILM COATED ORAL DAILY
Qty: 120 TABLET | Refills: 0 | Status: SHIPPED | OUTPATIENT
Start: 2025-06-10 | End: 2025-08-09

## 2025-06-10 RX ORDER — COLESTIPOL HYDROCHLORIDE 1 G/1
1 TABLET ORAL EVERY MORNING
Qty: 30 TABLET | Refills: 11 | Status: SHIPPED | OUTPATIENT
Start: 2025-06-10

## 2025-06-10 NOTE — TELEPHONE ENCOUNTER
Titration Study Once 11/06/2025               Patient Active Problem List:     Major depressive disorder, recurrent episode, severe (HCC)     Bilateral lower abdominal pain     Irritable bowel syndrome with constipation and diarrhea     Uterine leiomyoma     Mirena IUD (1/24/17)     Abnormal uterine bleeding (AUB)     Small bowel obstruction (HCC)     Essential hypertension     Seasonal allergies     Sleep difficulties     Bilateral carpal tunnel syndrome     Bipolar 1 disorder (HCC)     Atypical chest pain     Trigger finger of left thumb     Carpal tunnel syndrome of right wrist

## 2025-07-16 NOTE — TELEPHONE ENCOUNTER
Writer mailed CPAP titration order to patient. Writer left vm informing of contact number to call to schedule.

## 2025-08-07 DIAGNOSIS — F33.2 SEVERE EPISODE OF RECURRENT MAJOR DEPRESSIVE DISORDER, WITHOUT PSYCHOTIC FEATURES (HCC): ICD-10-CM

## 2025-08-08 RX ORDER — SERTRALINE HYDROCHLORIDE 100 MG/1
200 TABLET, FILM COATED ORAL DAILY
Qty: 120 TABLET | Refills: 11 | Status: SHIPPED | OUTPATIENT
Start: 2025-08-08 | End: 2025-08-08 | Stop reason: CLARIF

## 2025-08-08 RX ORDER — SERTRALINE HYDROCHLORIDE 100 MG/1
200 TABLET, FILM COATED ORAL DAILY
Qty: 120 TABLET | Refills: 0 | Status: SHIPPED | OUTPATIENT
Start: 2025-08-08 | End: 2025-10-07

## (undated) DEVICE — GAUZE,SPONGE,4"X4",16PLY,XRAY,STRL,LF: Brand: MEDLINE

## (undated) DEVICE — PADDING CAST W2INXL4YD COT LO LINTING WYTEX

## (undated) DEVICE — SUTURE NONABSORBABLE MONOFILAMENT 3-0 PS-1 18 IN BLK ETHILON 1663H

## (undated) DEVICE — SYRINGE IRRIG 60ML SFT PLIABLE BLB EZ TO GRP 1 HND USE W/

## (undated) DEVICE — DRAPE,REIN 53X77,STERILE: Brand: MEDLINE

## (undated) DEVICE — APPLICATOR MEDICATED 26 CC SOLUTION HI LT ORNG CHLORAPREP

## (undated) DEVICE — DRESSING PETRO W3XL3IN OIL EMUL N ADH GZ KNIT IMPREG CELOS

## (undated) DEVICE — NEEDLE HYPO 25GA L1.5IN BLU POLYPR HUB S STL REG BVL STR

## (undated) DEVICE — TOWEL,OR,DSP,ST,NATURAL,DLX,4/PK,20PK/CS: Brand: MEDLINE

## (undated) DEVICE — BLADE,CARBON-STEEL,15,STRL,DISPOSABLE,TB: Brand: MEDLINE

## (undated) DEVICE — CORD ES L12FT BPLR FRCP

## (undated) DEVICE — STRAP,POSITIONING,KNEE/BODY,FOAM,4X60": Brand: MEDLINE

## (undated) DEVICE — BNDG,ELSTC,MATRIX,STRL,2"X5YD,LF,HOOK&LP: Brand: MEDLINE

## (undated) DEVICE — BANDAGE GZ W2XL75IN ST RAYON POLY CNFRM STRTCH LTWT

## (undated) DEVICE — PEN: MARKING STD 100/CS: Brand: MEDICAL ACTION INDUSTRIES

## (undated) DEVICE — ZIMMER® STERILE DISPOSABLE TOURNIQUET CUFF WITH PROTECTIVE SLEEVE AND PLC, DUAL PORT, SINGLE BLADDER, 12 IN. (30 CM)

## (undated) DEVICE — GAUZE,SPONGE,FLUFF,6"X6.75",STRL,5/TRAY: Brand: MEDLINE

## (undated) DEVICE — COUNTER NDL 10 COUNT HLD 20 FOAM BLK SGL MAG